# Patient Record
Sex: FEMALE | Race: ASIAN | NOT HISPANIC OR LATINO | Employment: OTHER | URBAN - METROPOLITAN AREA
[De-identification: names, ages, dates, MRNs, and addresses within clinical notes are randomized per-mention and may not be internally consistent; named-entity substitution may affect disease eponyms.]

---

## 2023-02-03 PROBLEM — H35.3131 EARLY DRY STAGE NONEXUDATIVE AGE-RELATED MACULAR DEGENERATION OF BOTH EYES: Status: ACTIVE | Noted: 2023-02-03

## 2023-02-03 PROBLEM — R73.03 PREDIABETES: Status: ACTIVE | Noted: 2023-02-03

## 2023-02-03 PROBLEM — H25.813 COMBINED FORM OF AGE-RELATED CATARACT, BOTH EYES: Status: ACTIVE | Noted: 2023-02-03

## 2023-02-03 PROBLEM — I10 BENIGN ESSENTIAL HYPERTENSION: Status: ACTIVE | Noted: 2023-02-03

## 2023-02-03 PROBLEM — H11.159 PINGUECULA: Status: ACTIVE | Noted: 2023-02-03

## 2023-02-03 PROBLEM — E78.6 LOW HDL (UNDER 40): Status: ACTIVE | Noted: 2023-02-03

## 2023-02-03 PROBLEM — D64.9 ANEMIA: Status: ACTIVE | Noted: 2023-02-03

## 2023-02-03 PROBLEM — H26.9 CATARACTS, BOTH EYES: Status: ACTIVE | Noted: 2023-02-03

## 2023-02-03 PROBLEM — I89.0 LYMPHEDEMA OF RIGHT ARM: Status: ACTIVE | Noted: 2023-02-03

## 2023-02-03 PROBLEM — H52.7 REFRACTIVE ERROR: Status: ACTIVE | Noted: 2023-02-03

## 2023-02-03 PROBLEM — L20.9 ATOPIC DERMATITIS: Status: ACTIVE | Noted: 2023-02-03

## 2023-02-03 PROBLEM — E78.00 HYPERCHOLESTEROLEMIA: Status: ACTIVE | Noted: 2023-02-03

## 2023-02-03 PROBLEM — M19.079 OSTEOARTHRITIS OF MIDFOOT: Status: ACTIVE | Noted: 2023-02-03

## 2023-02-03 PROBLEM — I87.2 CHRONIC STASIS DERMATITIS: Status: ACTIVE | Noted: 2023-02-03

## 2023-02-03 PROBLEM — R25.2 MUSCLE CRAMPS: Status: ACTIVE | Noted: 2023-02-03

## 2023-02-03 PROBLEM — Z97.3 WEARS CONTACT LENSES: Status: ACTIVE | Noted: 2023-02-03

## 2023-02-03 PROBLEM — M81.0 OSTEOPOROSIS: Status: ACTIVE | Noted: 2023-02-03

## 2023-02-03 PROBLEM — C18.7 CANCER OF SIGMOID COLON (MULTI): Status: ACTIVE | Noted: 2023-02-03

## 2023-02-03 PROBLEM — M79.18 MUSCULOSKELETAL PAIN: Status: ACTIVE | Noted: 2023-02-03

## 2023-02-03 PROBLEM — H35.372 EPIRETINAL MEMBRANE (ERM) OF LEFT EYE: Status: ACTIVE | Noted: 2023-02-03

## 2023-02-03 PROBLEM — Z97.3 WEARS GLASSES: Status: ACTIVE | Noted: 2023-02-03

## 2023-02-03 PROBLEM — H53.8 BLURRED VISION, BILATERAL: Status: ACTIVE | Noted: 2023-02-03

## 2023-02-03 PROBLEM — R60.9 DEPENDENT EDEMA: Status: ACTIVE | Noted: 2023-02-03

## 2023-02-03 PROBLEM — H35.30 MACULAR DEGENERATION: Status: ACTIVE | Noted: 2023-02-03

## 2023-02-03 PROBLEM — R73.9 HYPERGLYCEMIA: Status: ACTIVE | Noted: 2023-02-03

## 2023-02-03 PROBLEM — R05.3 COUGH, PERSISTENT: Status: ACTIVE | Noted: 2023-02-03

## 2023-02-03 PROBLEM — E55.9 VITAMIN D DEFICIENCY: Status: ACTIVE | Noted: 2023-02-03

## 2023-02-03 PROBLEM — Z87.312 HISTORY OF STRESS FRACTURE: Status: ACTIVE | Noted: 2023-02-03

## 2023-02-03 PROBLEM — E78.1 ESSENTIAL HYPERTRIGLYCERIDEMIA: Status: ACTIVE | Noted: 2023-02-03

## 2023-02-03 PROBLEM — Z85.3 HISTORY OF ADENOCARCINOMA OF BREAST: Status: ACTIVE | Noted: 2023-02-03

## 2023-02-03 RX ORDER — LISINOPRIL 10 MG/1
TABLET ORAL
COMMUNITY
Start: 2022-08-18 | End: 2023-03-17 | Stop reason: ALTCHOICE

## 2023-02-03 RX ORDER — FENOFIBRATE 54 MG/1
TABLET ORAL
COMMUNITY
Start: 2017-09-08 | End: 2023-03-14

## 2023-02-03 RX ORDER — CALCIUM CARBONATE/VITAMIN D3 600MG-5MCG
TABLET ORAL
COMMUNITY
End: 2023-03-17 | Stop reason: ALTCHOICE

## 2023-02-03 RX ORDER — TRIAMCINOLONE ACETONIDE 1 MG/G
CREAM TOPICAL
COMMUNITY
Start: 2022-09-14 | End: 2023-03-17 | Stop reason: ALTCHOICE

## 2023-02-03 RX ORDER — FUROSEMIDE 20 MG/1
TABLET ORAL
COMMUNITY
Start: 2022-08-18 | End: 2023-03-17

## 2023-02-03 RX ORDER — GABAPENTIN 100 MG/1
CAPSULE ORAL
COMMUNITY
Start: 2021-09-14 | End: 2023-03-17 | Stop reason: ALTCHOICE

## 2023-02-03 RX ORDER — ASPIRIN 81 MG/1
TABLET ORAL
COMMUNITY
Start: 2020-07-30 | End: 2023-03-17 | Stop reason: ALTCHOICE

## 2023-02-03 RX ORDER — ERGOCALCIFEROL 1.25 MG/1
CAPSULE ORAL
COMMUNITY
Start: 2016-04-04 | End: 2023-03-17 | Stop reason: ALTCHOICE

## 2023-02-03 RX ORDER — AZITHROMYCIN 250 MG/1
TABLET, FILM COATED ORAL
COMMUNITY
Start: 2022-10-19 | End: 2023-03-17 | Stop reason: ALTCHOICE

## 2023-03-14 DIAGNOSIS — E78.5 HYPERLIPIDEMIA, UNSPECIFIED HYPERLIPIDEMIA TYPE: Primary | ICD-10-CM

## 2023-03-14 RX ORDER — FENOFIBRATE 54 MG/1
TABLET ORAL
Qty: 90 TABLET | Refills: 0 | Status: SHIPPED | OUTPATIENT
Start: 2023-03-14 | End: 2023-07-24 | Stop reason: SDUPTHER

## 2023-03-16 DIAGNOSIS — R60.9 EDEMA, UNSPECIFIED TYPE: Primary | ICD-10-CM

## 2023-03-17 ENCOUNTER — OFFICE VISIT (OUTPATIENT)
Dept: PRIMARY CARE | Facility: CLINIC | Age: 73
End: 2023-03-17
Payer: COMMERCIAL

## 2023-03-17 VITALS
BODY MASS INDEX: 30.21 KG/M2 | SYSTOLIC BLOOD PRESSURE: 130 MMHG | WEIGHT: 160 LBS | HEIGHT: 61 IN | DIASTOLIC BLOOD PRESSURE: 82 MMHG | HEART RATE: 82 BPM | TEMPERATURE: 99.3 F | OXYGEN SATURATION: 98 %

## 2023-03-17 DIAGNOSIS — M81.0 OSTEOPOROSIS, UNSPECIFIED OSTEOPOROSIS TYPE, UNSPECIFIED PATHOLOGICAL FRACTURE PRESENCE: ICD-10-CM

## 2023-03-17 DIAGNOSIS — Z13.6 SCREENING FOR CARDIOVASCULAR CONDITION: ICD-10-CM

## 2023-03-17 DIAGNOSIS — R73.03 PREDIABETES: ICD-10-CM

## 2023-03-17 DIAGNOSIS — I89.0 LYMPHEDEMA OF RIGHT ARM: ICD-10-CM

## 2023-03-17 DIAGNOSIS — I10 BENIGN ESSENTIAL HYPERTENSION: Primary | ICD-10-CM

## 2023-03-17 DIAGNOSIS — E78.5 HYPERLIPIDEMIA, UNSPECIFIED HYPERLIPIDEMIA TYPE: ICD-10-CM

## 2023-03-17 DIAGNOSIS — Z00.00 HEALTH CARE MAINTENANCE: ICD-10-CM

## 2023-03-17 DIAGNOSIS — Z00.00 HEALTHCARE MAINTENANCE: ICD-10-CM

## 2023-03-17 DIAGNOSIS — Z13.21 ENCOUNTER FOR VITAMIN DEFICIENCY SCREENING: ICD-10-CM

## 2023-03-17 PROCEDURE — 1159F MED LIST DOCD IN RCRD: CPT | Performed by: INTERNAL MEDICINE

## 2023-03-17 PROCEDURE — 3075F SYST BP GE 130 - 139MM HG: CPT | Performed by: INTERNAL MEDICINE

## 2023-03-17 PROCEDURE — 1036F TOBACCO NON-USER: CPT | Performed by: INTERNAL MEDICINE

## 2023-03-17 PROCEDURE — 1160F RVW MEDS BY RX/DR IN RCRD: CPT | Performed by: INTERNAL MEDICINE

## 2023-03-17 PROCEDURE — 3079F DIAST BP 80-89 MM HG: CPT | Performed by: INTERNAL MEDICINE

## 2023-03-17 PROCEDURE — 99214 OFFICE O/P EST MOD 30 MIN: CPT | Performed by: INTERNAL MEDICINE

## 2023-03-17 RX ORDER — CALCIUM CARBONATE/VITAMIN D3 600MG-5MCG
1 TABLET ORAL 2 TIMES DAILY
COMMUNITY

## 2023-03-17 RX ORDER — FUROSEMIDE 20 MG/1
1 TABLET ORAL DAILY
COMMUNITY
Start: 2022-08-18 | End: 2023-06-13 | Stop reason: ALTCHOICE

## 2023-03-17 RX ORDER — FUROSEMIDE 20 MG/1
TABLET ORAL
Qty: 30 TABLET | Refills: 0 | Status: SHIPPED | OUTPATIENT
Start: 2023-03-17 | End: 2023-06-13 | Stop reason: ALTCHOICE

## 2023-03-17 RX ORDER — FENOFIBRATE 54 MG/1
1 TABLET ORAL
COMMUNITY
Start: 2017-09-08 | End: 2023-07-24 | Stop reason: SDUPTHER

## 2023-03-17 RX ORDER — LISINOPRIL 10 MG/1
1 TABLET ORAL DAILY
COMMUNITY
Start: 2022-08-18 | End: 2023-08-17

## 2023-03-17 RX ORDER — GABAPENTIN 100 MG/1
CAPSULE ORAL
COMMUNITY
Start: 2021-09-14 | End: 2023-09-13 | Stop reason: SDUPTHER

## 2023-03-17 RX ORDER — GINSENG 100 MG
CAPSULE ORAL
COMMUNITY
Start: 2019-05-21

## 2023-03-17 RX ORDER — TRIAMCINOLONE ACETONIDE 1 MG/G
1 CREAM TOPICAL 2 TIMES DAILY
COMMUNITY
Start: 2022-09-14

## 2023-03-17 ASSESSMENT — ENCOUNTER SYMPTOMS
CARDIOVASCULAR NEGATIVE: 1
HEMATOLOGIC/LYMPHATIC NEGATIVE: 1
CONSTITUTIONAL NEGATIVE: 1
PSYCHIATRIC NEGATIVE: 1
NEUROLOGICAL NEGATIVE: 1
MUSCULOSKELETAL NEGATIVE: 1
RESPIRATORY NEGATIVE: 1
GASTROINTESTINAL NEGATIVE: 1
ENDOCRINE NEGATIVE: 1
EYES NEGATIVE: 1

## 2023-03-17 ASSESSMENT — COLUMBIA-SUICIDE SEVERITY RATING SCALE - C-SSRS: 1. IN THE PAST MONTH, HAVE YOU WISHED YOU WERE DEAD OR WISHED YOU COULD GO TO SLEEP AND NOT WAKE UP?: NO

## 2023-03-17 ASSESSMENT — PATIENT HEALTH QUESTIONNAIRE - PHQ9
1. LITTLE INTEREST OR PLEASURE IN DOING THINGS: NOT AT ALL
2. FEELING DOWN, DEPRESSED OR HOPELESS: NOT AT ALL
SUM OF ALL RESPONSES TO PHQ9 QUESTIONS 1 AND 2: 0

## 2023-03-17 ASSESSMENT — PAIN SCALES - GENERAL: PAINLEVEL: 0-NO PAIN

## 2023-03-17 NOTE — PROGRESS NOTES
"Subjective   Patient ID: Santy Brody is a 72 y.o. female who presents for Follow-up.    HPI     Review of Systems   Constitutional: Negative.    HENT: Negative.     Eyes: Negative.    Respiratory: Negative.     Cardiovascular: Negative.    Gastrointestinal: Negative.    Endocrine: Negative.    Musculoskeletal: Negative.    Skin: Negative.    Neurological: Negative.    Hematological: Negative.    Psychiatric/Behavioral: Negative.     All other systems reviewed and are negative.      Objective   Ht 1.549 m (5' 1\")   Wt 72.6 kg (160 lb)   BMI 30.23 kg/m²     Physical Exam  Vitals and nursing note reviewed. Exam conducted with a chaperone present.   Constitutional:       Appearance: Normal appearance.   HENT:      Head: Normocephalic and atraumatic.      Right Ear: Tympanic membrane, ear canal and external ear normal.      Left Ear: Tympanic membrane, ear canal and external ear normal.      Nose: Nose normal.      Mouth/Throat:      Mouth: Mucous membranes are moist.      Pharynx: Oropharynx is clear.   Eyes:      Extraocular Movements: Extraocular movements intact.      Conjunctiva/sclera: Conjunctivae normal.      Pupils: Pupils are equal, round, and reactive to light.   Cardiovascular:      Rate and Rhythm: Normal rate and regular rhythm.      Pulses: Normal pulses.      Heart sounds: Normal heart sounds.   Pulmonary:      Effort: Pulmonary effort is normal.      Breath sounds: Normal breath sounds.   Abdominal:      General: Abdomen is flat. Bowel sounds are normal.      Palpations: Abdomen is soft.   Musculoskeletal:         General: Normal range of motion.      Cervical back: Neck supple.   Skin:     General: Skin is warm and dry.      Capillary Refill: Capillary refill takes 2 to 3 seconds.   Neurological:      General: No focal deficit present.      Mental Status: She is alert and oriented to person, place, and time. Mental status is at baseline.   Psychiatric:         Mood and Affect: Mood normal.         " Behavior: Behavior normal.         Thought Content: Thought content normal.         Judgment: Judgment normal.         Assessment/Plan   Problem List Items Addressed This Visit          Medium    Benign essential hypertension - Primary    Relevant Orders    CBC    Comprehensive Metabolic Panel    TSH with reflex to Free T4 if abnormal    Hypercholesterolemia    Lymphedema of right arm    Osteoarthritis of midfoot    Osteoporosis    Relevant Orders    Vitamin D 1,25 Dihydroxy    Prediabetes    Relevant Orders    Hemoglobin A1C     Other Visit Diagnoses       Health care maintenance        Hyperlipidemia, unspecified hyperlipidemia type        Relevant Orders    CBC    Comprehensive Metabolic Panel    Lipid Panel    TSH with reflex to Free T4 if abnormal    Healthcare maintenance        Relevant Orders    Comprehensive Metabolic Panel    Lipid Panel    Screening for cardiovascular condition        Relevant Orders    Lipid Panel    Encounter for vitamin deficiency screening        Relevant Orders    Vitamin D 1,25 Dihydroxy

## 2023-04-04 ENCOUNTER — HOSPITAL ENCOUNTER (OUTPATIENT)
Dept: DATA CONVERSION | Facility: HOSPITAL | Age: 73
End: 2023-04-04
Attending: OPHTHALMOLOGY | Admitting: OPHTHALMOLOGY
Payer: COMMERCIAL

## 2023-04-04 DIAGNOSIS — H25.812 COMBINED FORMS OF AGE-RELATED CATARACT, LEFT EYE: ICD-10-CM

## 2023-04-04 DIAGNOSIS — K76.0 FATTY (CHANGE OF) LIVER, NOT ELSEWHERE CLASSIFIED: ICD-10-CM

## 2023-04-04 DIAGNOSIS — I10 ESSENTIAL (PRIMARY) HYPERTENSION: ICD-10-CM

## 2023-04-04 DIAGNOSIS — Z96.641 PRESENCE OF RIGHT ARTIFICIAL HIP JOINT: ICD-10-CM

## 2023-06-13 DIAGNOSIS — I89.0 LYMPHEDEMA OF RIGHT ARM: Primary | ICD-10-CM

## 2023-06-13 RX ORDER — FUROSEMIDE 20 MG/1
TABLET ORAL
Qty: 90 TABLET | Refills: 1 | Status: SHIPPED | OUTPATIENT
Start: 2023-06-13 | End: 2024-01-15

## 2023-07-24 DIAGNOSIS — E78.5 HYPERLIPIDEMIA, UNSPECIFIED HYPERLIPIDEMIA TYPE: ICD-10-CM

## 2023-07-24 RX ORDER — FENOFIBRATE 54 MG/1
54 TABLET ORAL
Qty: 90 TABLET | Refills: 1 | Status: SHIPPED | OUTPATIENT
Start: 2023-07-24 | End: 2024-02-09 | Stop reason: SDUPTHER

## 2023-08-17 DIAGNOSIS — I10 BENIGN ESSENTIAL HYPERTENSION: Primary | ICD-10-CM

## 2023-08-17 RX ORDER — LISINOPRIL 10 MG/1
10 TABLET ORAL DAILY
Qty: 30 TABLET | Refills: 5 | Status: SHIPPED | OUTPATIENT
Start: 2023-08-17 | End: 2024-02-29 | Stop reason: SDUPTHER

## 2023-09-01 LAB
ALANINE AMINOTRANSFERASE (SGPT) (U/L) IN SER/PLAS: 11 U/L (ref 7–45)
ALBUMIN (G/DL) IN SER/PLAS: 4 G/DL (ref 3.4–5)
ALKALINE PHOSPHATASE (U/L) IN SER/PLAS: 64 U/L (ref 33–136)
ANION GAP IN SER/PLAS: 12 MMOL/L (ref 10–20)
ASPARTATE AMINOTRANSFERASE (SGOT) (U/L) IN SER/PLAS: 21 U/L (ref 9–39)
BASOPHILS (10*3/UL) IN BLOOD BY AUTOMATED COUNT: 0.04 X10E9/L (ref 0–0.1)
BASOPHILS/100 LEUKOCYTES IN BLOOD BY AUTOMATED COUNT: 1.5 % (ref 0–2)
BILIRUBIN TOTAL (MG/DL) IN SER/PLAS: 0.8 MG/DL (ref 0–1.2)
CALCIUM (MG/DL) IN SER/PLAS: 8.9 MG/DL (ref 8.6–10.3)
CARBON DIOXIDE, TOTAL (MMOL/L) IN SER/PLAS: 26 MMOL/L (ref 21–32)
CHLORIDE (MMOL/L) IN SER/PLAS: 100 MMOL/L (ref 98–107)
CREATININE (MG/DL) IN SER/PLAS: 1.2 MG/DL (ref 0.5–1.05)
EOSINOPHILS (10*3/UL) IN BLOOD BY AUTOMATED COUNT: 0.03 X10E9/L (ref 0–0.4)
EOSINOPHILS/100 LEUKOCYTES IN BLOOD BY AUTOMATED COUNT: 1.2 % (ref 0–6)
ERYTHROCYTE DISTRIBUTION WIDTH (RATIO) BY AUTOMATED COUNT: 17.2 % (ref 11.5–14.5)
ERYTHROCYTE MEAN CORPUSCULAR HEMOGLOBIN CONCENTRATION (G/DL) BY AUTOMATED: 30.6 G/DL (ref 32–36)
ERYTHROCYTE MEAN CORPUSCULAR VOLUME (FL) BY AUTOMATED COUNT: 75 FL (ref 80–100)
ERYTHROCYTES (10*6/UL) IN BLOOD BY AUTOMATED COUNT: 3.51 X10E12/L (ref 4–5.2)
GFR FEMALE: 48 ML/MIN/1.73M2
GLUCOSE (MG/DL) IN SER/PLAS: 92 MG/DL (ref 74–99)
HEMATOCRIT (%) IN BLOOD BY AUTOMATED COUNT: 26.5 % (ref 36–46)
HEMOGLOBIN (G/DL) IN BLOOD: 8.1 G/DL (ref 12–16)
IMMATURE GRANULOCYTES/100 LEUKOCYTES IN BLOOD BY AUTOMATED COUNT: 0.4 % (ref 0–0.9)
LEUKOCYTES (10*3/UL) IN BLOOD BY AUTOMATED COUNT: 2.6 X10E9/L (ref 4.4–11.3)
LYMPHOCYTES (10*3/UL) IN BLOOD BY AUTOMATED COUNT: 1.08 X10E9/L (ref 0.8–3)
LYMPHOCYTES/100 LEUKOCYTES IN BLOOD BY AUTOMATED COUNT: 41.7 % (ref 13–44)
MONOCYTES (10*3/UL) IN BLOOD BY AUTOMATED COUNT: 0.23 X10E9/L (ref 0.05–0.8)
MONOCYTES/100 LEUKOCYTES IN BLOOD BY AUTOMATED COUNT: 8.9 % (ref 2–10)
NEUTROPHILS (10*3/UL) IN BLOOD BY AUTOMATED COUNT: 1.2 X10E9/L (ref 1.6–5.5)
NEUTROPHILS/100 LEUKOCYTES IN BLOOD BY AUTOMATED COUNT: 46.3 % (ref 40–80)
NRBC (PER 100 WBCS) BY AUTOMATED COUNT: 1.2 /100 WBC (ref 0–0)
PLATELETS (10*3/UL) IN BLOOD AUTOMATED COUNT: 237 X10E9/L (ref 150–450)
POTASSIUM (MMOL/L) IN SER/PLAS: 4.6 MMOL/L (ref 3.5–5.3)
PROTEIN TOTAL: 7.5 G/DL (ref 6.4–8.2)
SODIUM (MMOL/L) IN SER/PLAS: 133 MMOL/L (ref 136–145)
UREA NITROGEN (MG/DL) IN SER/PLAS: 21 MG/DL (ref 6–23)

## 2023-09-06 ENCOUNTER — LAB (OUTPATIENT)
Dept: LAB | Facility: LAB | Age: 73
End: 2023-09-06
Payer: COMMERCIAL

## 2023-09-06 VITALS
HEART RATE: 97 BPM | DIASTOLIC BLOOD PRESSURE: 77 MMHG | SYSTOLIC BLOOD PRESSURE: 146 MMHG | TEMPERATURE: 97.5 F | RESPIRATION RATE: 16 BRPM

## 2023-09-06 DIAGNOSIS — E78.5 HYPERLIPIDEMIA, UNSPECIFIED HYPERLIPIDEMIA TYPE: ICD-10-CM

## 2023-09-06 DIAGNOSIS — Z13.21 ENCOUNTER FOR VITAMIN DEFICIENCY SCREENING: ICD-10-CM

## 2023-09-06 DIAGNOSIS — R73.03 PREDIABETES: ICD-10-CM

## 2023-09-06 DIAGNOSIS — Z13.6 SCREENING FOR CARDIOVASCULAR CONDITION: ICD-10-CM

## 2023-09-06 DIAGNOSIS — I10 BENIGN ESSENTIAL HYPERTENSION: ICD-10-CM

## 2023-09-06 DIAGNOSIS — M81.0 OSTEOPOROSIS, UNSPECIFIED OSTEOPOROSIS TYPE, UNSPECIFIED PATHOLOGICAL FRACTURE PRESENCE: ICD-10-CM

## 2023-09-06 DIAGNOSIS — Z00.00 HEALTHCARE MAINTENANCE: ICD-10-CM

## 2023-09-06 LAB
ALANINE AMINOTRANSFERASE (SGPT) (U/L) IN SER/PLAS: 13 U/L (ref 7–45)
ALBUMIN (G/DL) IN SER/PLAS: 4.4 G/DL (ref 3.4–5)
ALKALINE PHOSPHATASE (U/L) IN SER/PLAS: 63 U/L (ref 33–136)
ANION GAP IN SER/PLAS: 14 MMOL/L (ref 10–20)
ASPARTATE AMINOTRANSFERASE (SGOT) (U/L) IN SER/PLAS: 22 U/L (ref 9–39)
BILIRUBIN TOTAL (MG/DL) IN SER/PLAS: 1 MG/DL (ref 0–1.2)
CALCIUM (MG/DL) IN SER/PLAS: 10 MG/DL (ref 8.6–10.6)
CARBON DIOXIDE, TOTAL (MMOL/L) IN SER/PLAS: 27 MMOL/L (ref 21–32)
CHLORIDE (MMOL/L) IN SER/PLAS: 95 MMOL/L (ref 98–107)
CHOLESTEROL (MG/DL) IN SER/PLAS: 195 MG/DL (ref 0–199)
CHOLESTEROL IN HDL (MG/DL) IN SER/PLAS: 60.7 MG/DL
CHOLESTEROL/HDL RATIO: 3.2
CREATININE (MG/DL) IN SER/PLAS: 1.2 MG/DL (ref 0.5–1.05)
GFR FEMALE: 48 ML/MIN/1.73M2
GLUCOSE (MG/DL) IN SER/PLAS: 94 MG/DL (ref 74–99)
LDL: 89 MG/DL (ref 0–99)
NON HDL CHOLESTEROL: 134 MG/DL
POTASSIUM (MMOL/L) IN SER/PLAS: 4.7 MMOL/L (ref 3.5–5.3)
PROTEIN TOTAL: 7.7 G/DL (ref 6.4–8.2)
SODIUM (MMOL/L) IN SER/PLAS: 131 MMOL/L (ref 136–145)
THYROTROPIN (MIU/L) IN SER/PLAS BY DETECTION LIMIT <= 0.05 MIU/L: 2.22 MIU/L (ref 0.44–3.98)
TRIGLYCERIDE (MG/DL) IN SER/PLAS: 228 MG/DL (ref 0–149)
UREA NITROGEN (MG/DL) IN SER/PLAS: 18 MG/DL (ref 6–23)
VLDL: 46 MG/DL (ref 0–40)

## 2023-09-06 PROCEDURE — 83036 HEMOGLOBIN GLYCOSYLATED A1C: CPT

## 2023-09-06 PROCEDURE — 80053 COMPREHEN METABOLIC PANEL: CPT

## 2023-09-06 PROCEDURE — 84443 ASSAY THYROID STIM HORMONE: CPT

## 2023-09-06 PROCEDURE — 85027 COMPLETE CBC AUTOMATED: CPT

## 2023-09-06 PROCEDURE — 82652 VIT D 1 25-DIHYDROXY: CPT

## 2023-09-06 PROCEDURE — 80061 LIPID PANEL: CPT

## 2023-09-06 PROCEDURE — 36415 COLL VENOUS BLD VENIPUNCTURE: CPT

## 2023-09-07 LAB
ERYTHROCYTE DISTRIBUTION WIDTH (RATIO) BY AUTOMATED COUNT: 16.7 % (ref 11.5–14.5)
ERYTHROCYTE MEAN CORPUSCULAR HEMOGLOBIN CONCENTRATION (G/DL) BY AUTOMATED: 30.2 G/DL (ref 32–36)
ERYTHROCYTE MEAN CORPUSCULAR VOLUME (FL) BY AUTOMATED COUNT: 75 FL (ref 80–100)
ERYTHROCYTES (10*6/UL) IN BLOOD BY AUTOMATED COUNT: 3.58 X10E12/L (ref 4–5.2)
ESTIMATED AVERAGE GLUCOSE FOR HBA1C: 114 MG/DL
HEMATOCRIT (%) IN BLOOD BY AUTOMATED COUNT: 26.8 % (ref 36–46)
HEMOGLOBIN (G/DL) IN BLOOD: 8.1 G/DL (ref 12–16)
HEMOGLOBIN A1C/HEMOGLOBIN TOTAL IN BLOOD: 5.6 %
LEUKOCYTES (10*3/UL) IN BLOOD BY AUTOMATED COUNT: 2.9 X10E9/L (ref 4.4–11.3)
NRBC (PER 100 WBCS) BY AUTOMATED COUNT: 1.4 /100 WBC (ref 0–0)
PLATELETS (10*3/UL) IN BLOOD AUTOMATED COUNT: 300 X10E9/L (ref 150–450)

## 2023-09-09 LAB — VITAMIN D 1,25-DIHYDROXY: 56 PG/ML (ref 19.9–79.3)

## 2023-09-12 NOTE — PROGRESS NOTES
"Subjective   Patient ID: Santy Brody is a 73 y.o. female who presents for Annual Exam (Medicare wellness ) and Med Refill (Gabapentin ).  Med Refill      Feels ok.    On   2  chemo  meds.   Signs of  remission.     Below is the patient's most recent value for Albumin, ALT, AST, BUN, Calcium, Chloride, Cholesterol, CO2, Creatinine, GFR, Glucose, HDL, Hematocrit, Hemoglobin, Hemoglobin A1C, LDL, Magnesium, Phosphorus, Platelets, Potassium, PSA, Sodium, Triglycerides, and WBC.   Lab Results   Component Value Date    ALBUMIN 4.4 09/06/2023    ALT 13 09/06/2023    AST 22 09/06/2023    BUN 18 09/06/2023    CALCIUM 10.0 09/06/2023    CL 95 (L) 09/06/2023    CHOL 195 09/06/2023    CO2 27 09/06/2023    CREATININE 1.20 (H) 09/06/2023    HDL 60.7 09/06/2023    HCT 26.8 (L) 09/06/2023    HGB 8.1 (L) 09/06/2023    HGBA1C 5.6 09/06/2023     09/06/2023    K 4.7 09/06/2023     (L) 09/06/2023    TRIG 228 (H) 09/06/2023    WBC 2.9 (L) 09/06/2023     Note: for a comprehensive list of the patient's lab results, access the Results Review activity.  Review of Systems   Constitutional: Negative.    All other systems reviewed and are negative.      Objective   BP Readings from Last 3 Encounters:   09/13/23 110/60   03/17/23 130/82   03/09/23 (!) 165/105      Wt Readings from Last 3 Encounters:   09/13/23 72.9 kg (160 lb 12.8 oz)   03/17/23 72.6 kg (160 lb)   03/09/23 72.8 kg (160 lb 7.9 oz)      BMI: Estimated body mass index is 30.38 kg/m² as calculated from the following:    Height as of this encounter: 1.549 m (5' 1\").    Weight as of this encounter: 72.9 kg (160 lb 12.8 oz).    BSA: Estimated body surface area is 1.77 meters squared as calculated from the following:    Height as of this encounter: 1.549 m (5' 1\").    Weight as of this encounter: 72.9 kg (160 lb 12.8 oz).  Physical Exam  Vitals and nursing note reviewed.   Constitutional:       Appearance: Normal appearance.   HENT:      Head: Normocephalic and atraumatic. "      Right Ear: External ear normal.      Left Ear: External ear normal.      Nose: Nose normal.   Eyes:      Conjunctiva/sclera: Conjunctivae normal.   Cardiovascular:      Rate and Rhythm: Normal rate and regular rhythm.      Pulses: Normal pulses.      Heart sounds: Normal heart sounds.   Pulmonary:      Effort: Pulmonary effort is normal.      Breath sounds: Normal breath sounds.   Musculoskeletal:         General: No swelling. Normal range of motion.   Skin:     General: Skin is warm and dry.      Capillary Refill: Capillary refill takes less than 2 seconds.   Neurological:      General: No focal deficit present.      Mental Status: She is alert and oriented to person, place, and time. Mental status is at baseline.   Psychiatric:         Mood and Affect: Mood normal.         Behavior: Behavior normal.         Thought Content: Thought content normal.         Assessment/Plan   Problem List Items Addressed This Visit          Medium    Benign essential hypertension    Cancer of sigmoid colon (CMS/HCC)    Chronic stasis dermatitis    History of adenocarcinoma of breast    Hyperglycemia    Hyperlipidemia    Low HDL (under 40)    Musculoskeletal pain    Relevant Medications    gabapentin (Neurontin) 100 mg capsule    Osteoporosis    Prediabetes - Primary    Vitamin D deficiency     Other Visit Diagnoses       Screening for multiple conditions        Routine general medical examination at health care facility        Relevant Orders    1 Year Follow Up In Primary Care - Wellness Exam    Encounter for screening mammogram for malignant neoplasm of breast        Relevant Orders    BI mammo left screening tomosynthesis             Awv  Rev lab

## 2023-09-13 ENCOUNTER — OFFICE VISIT (OUTPATIENT)
Dept: PRIMARY CARE | Facility: CLINIC | Age: 73
End: 2023-09-13
Payer: MEDICARE

## 2023-09-13 VITALS
WEIGHT: 160.8 LBS | HEART RATE: 90 BPM | HEIGHT: 61 IN | BODY MASS INDEX: 30.36 KG/M2 | DIASTOLIC BLOOD PRESSURE: 60 MMHG | SYSTOLIC BLOOD PRESSURE: 110 MMHG | OXYGEN SATURATION: 96 %

## 2023-09-13 DIAGNOSIS — M81.0 OSTEOPOROSIS, UNSPECIFIED OSTEOPOROSIS TYPE, UNSPECIFIED PATHOLOGICAL FRACTURE PRESENCE: ICD-10-CM

## 2023-09-13 DIAGNOSIS — R73.9 HYPERGLYCEMIA: ICD-10-CM

## 2023-09-13 DIAGNOSIS — Z12.31 ENCOUNTER FOR SCREENING MAMMOGRAM FOR MALIGNANT NEOPLASM OF BREAST: ICD-10-CM

## 2023-09-13 DIAGNOSIS — Z00.00 ROUTINE GENERAL MEDICAL EXAMINATION AT HEALTH CARE FACILITY: ICD-10-CM

## 2023-09-13 DIAGNOSIS — C18.7 CANCER OF SIGMOID COLON (MULTI): ICD-10-CM

## 2023-09-13 DIAGNOSIS — E78.5 HYPERLIPIDEMIA, UNSPECIFIED HYPERLIPIDEMIA TYPE: ICD-10-CM

## 2023-09-13 DIAGNOSIS — Z85.3 HISTORY OF ADENOCARCINOMA OF BREAST: ICD-10-CM

## 2023-09-13 DIAGNOSIS — Z13.89 SCREENING FOR MULTIPLE CONDITIONS: ICD-10-CM

## 2023-09-13 DIAGNOSIS — E78.6 LOW HDL (UNDER 40): ICD-10-CM

## 2023-09-13 DIAGNOSIS — E55.9 VITAMIN D DEFICIENCY: ICD-10-CM

## 2023-09-13 DIAGNOSIS — R73.03 PREDIABETES: Primary | ICD-10-CM

## 2023-09-13 DIAGNOSIS — M79.18 MUSCULOSKELETAL PAIN: ICD-10-CM

## 2023-09-13 DIAGNOSIS — I10 BENIGN ESSENTIAL HYPERTENSION: ICD-10-CM

## 2023-09-13 DIAGNOSIS — I87.2 CHRONIC STASIS DERMATITIS: ICD-10-CM

## 2023-09-13 PROCEDURE — 3078F DIAST BP <80 MM HG: CPT | Performed by: INTERNAL MEDICINE

## 2023-09-13 PROCEDURE — 1160F RVW MEDS BY RX/DR IN RCRD: CPT | Performed by: INTERNAL MEDICINE

## 2023-09-13 PROCEDURE — 1126F AMNT PAIN NOTED NONE PRSNT: CPT | Performed by: INTERNAL MEDICINE

## 2023-09-13 PROCEDURE — 3008F BODY MASS INDEX DOCD: CPT | Performed by: INTERNAL MEDICINE

## 2023-09-13 PROCEDURE — 3074F SYST BP LT 130 MM HG: CPT | Performed by: INTERNAL MEDICINE

## 2023-09-13 PROCEDURE — G0439 PPPS, SUBSEQ VISIT: HCPCS | Performed by: INTERNAL MEDICINE

## 2023-09-13 PROCEDURE — 1159F MED LIST DOCD IN RCRD: CPT | Performed by: INTERNAL MEDICINE

## 2023-09-13 PROCEDURE — 1036F TOBACCO NON-USER: CPT | Performed by: INTERNAL MEDICINE

## 2023-09-13 RX ORDER — GABAPENTIN 100 MG/1
100 CAPSULE ORAL NIGHTLY
Qty: 90 CAPSULE | Refills: 1 | Status: SHIPPED | OUTPATIENT
Start: 2023-09-13 | End: 2024-03-18 | Stop reason: SDUPTHER

## 2023-09-13 ASSESSMENT — PATIENT HEALTH QUESTIONNAIRE - PHQ9
SUM OF ALL RESPONSES TO PHQ9 QUESTIONS 1 AND 2: 0
1. LITTLE INTEREST OR PLEASURE IN DOING THINGS: NOT AT ALL
2. FEELING DOWN, DEPRESSED OR HOPELESS: NOT AT ALL

## 2023-09-13 ASSESSMENT — ENCOUNTER SYMPTOMS
OCCASIONAL FEELINGS OF UNSTEADINESS: 0
CONSTITUTIONAL NEGATIVE: 1
DEPRESSION: 0
LOSS OF SENSATION IN FEET: 0

## 2023-09-13 ASSESSMENT — COLUMBIA-SUICIDE SEVERITY RATING SCALE - C-SSRS
6. HAVE YOU EVER DONE ANYTHING, STARTED TO DO ANYTHING, OR PREPARED TO DO ANYTHING TO END YOUR LIFE?: NO
1. IN THE PAST MONTH, HAVE YOU WISHED YOU WERE DEAD OR WISHED YOU COULD GO TO SLEEP AND NOT WAKE UP?: NO
2. HAVE YOU ACTUALLY HAD ANY THOUGHTS OF KILLING YOURSELF?: NO

## 2023-09-13 NOTE — PROGRESS NOTES
"Subjective   Patient ID: Santy Brody is a 73 y.o. female who presents for Annual Exam (Medicare wellness ) and Med Refill (Gabapentin ).    HPI     Review of Systems    Objective   BP (!) 148/91   Pulse 90   Ht 1.549 m (5' 1\")   Wt 72.9 kg (160 lb 12.8 oz)   SpO2 96%   BMI 30.38 kg/m²     Physical Exam    Assessment/Plan          "

## 2023-09-14 NOTE — H&P
History of Present Illness:   History Present Illness:  Reason for surgery: cataract right eye   HPI:    Patient presents today for planned CEIOL OD    Allergies:        Allergies:  ·  Xgeva : Other (Severe)    Home Medication Review:   Home Medications Reviewed: yes     Impression/Procedure:   ·  Impression and Planned Procedure: CEIOL OD       ERAS (Enhanced Recovery After Surgery):  ·  ERAS Patient: no       Vital Signs:  Temperature C: 36.4 degrees C   Temperature F: 97.5 degrees F   Heart Rate: 97 beats per minute   Respiratory Rate: 16 breath per minute   Blood Pressure Systolic: 146 mm/Hg   Blood Pressure Diastolic: 77 mm/Hg     Physical Exam by System:    Constitutional: Well developed, awake/alert, no distress,  alert and cooperative   Eyes: PERRL, see clinic note   ENMT: mucous membranes moist, no apparent injury,  no lesions seen   Respiratory/Thorax: No respiratory distress   Cardiovascular: Per anesthesia   Extremities: normal extremities, no cyanosis edema   Skin: Warm and dry, no lesions, no rashes     Consent:   COVID-19 Consent:  ·  COVID-19 Risk Consent Surgeon has reviewed key risks related to the risk of saba COVID-19 and if they contract COVID-19 what the risks are.     Attestation:   Note Completion:  I am a:  Resident/Fellow   Attending Attestation I saw and evaluated the patient.  I personally obtained the key and critical portions of the history and physical exam or was physically present for key and  critical portions performed by the resident/fellow. I reviewed the resident/fellow?s documentation and discussed the patient with the resident/fellow.  I agree with the resident/fellow?s medical decision making as documented in the note.     I personally evaluated the patient on 04-Apr-2023         Electronic Signatures:  Catalino Delaney (Resident))  (Signed 04-Apr-2023 12:47)   Authored: History of Present Illness, Allergies, Home  Medication Review, Impression/Procedure, ERAS,  Physical Exam, Consent, Note Completion  Isabel Wells)  (Signed 04-Apr-2023 13:22)   Authored: Note Completion   Co-Signer: History of Present Illness, Allergies, Home Medication Review, Impression/Procedure, ERAS, Physical Exam, Consent, Note Completion      Last Updated: 04-Apr-2023 13:22 by Isabel Wells)

## 2023-09-21 VITALS — BODY MASS INDEX: 31.64 KG/M2 | WEIGHT: 161.16 LBS | HEIGHT: 60 IN

## 2023-09-21 DIAGNOSIS — C50.919 MALIGNANT NEOPLASM OF FEMALE BREAST, UNSPECIFIED ESTROGEN RECEPTOR STATUS, UNSPECIFIED LATERALITY, UNSPECIFIED SITE OF BREAST (MULTI): Primary | ICD-10-CM

## 2023-09-21 RX ORDER — HEPARIN SODIUM,PORCINE/PF 10 UNIT/ML
50 SYRINGE (ML) INTRAVENOUS AS NEEDED
Status: CANCELLED | OUTPATIENT
Start: 2023-09-30

## 2023-09-21 RX ORDER — HEPARIN 100 UNIT/ML
500 SYRINGE INTRAVENOUS AS NEEDED
Status: CANCELLED | OUTPATIENT
Start: 2023-09-30

## 2023-10-02 ENCOUNTER — ANCILLARY PROCEDURE (OUTPATIENT)
Dept: RADIOLOGY | Facility: CLINIC | Age: 73
End: 2023-10-02
Payer: COMMERCIAL

## 2023-10-02 DIAGNOSIS — Z51.12 ENCOUNTER FOR ANTINEOPLASTIC IMMUNOTHERAPY: ICD-10-CM

## 2023-10-02 DIAGNOSIS — C50.911 MALIGNANT NEOPLASM OF UNSPECIFIED SITE OF RIGHT FEMALE BREAST (MULTI): ICD-10-CM

## 2023-10-02 DIAGNOSIS — Z79.818 LONG TERM (CURRENT) USE OF OTHER AGENTS AFFECTING ESTROGEN RECEPTORS AND ESTROGEN LEVELS: ICD-10-CM

## 2023-10-02 DIAGNOSIS — C79.51 SECONDARY MALIGNANT NEOPLASM OF BONE (MULTI): ICD-10-CM

## 2023-10-02 PROCEDURE — 77085 DXA BONE DENSITY AXL VRT FX: CPT

## 2023-10-02 NOTE — OP NOTE
Post Operative Note:     Post-Procedure Diagnosis: cataract, right   Procedure: 1. cataract extraction with lens placement,  right  2.   3.   4.   5.   Surgeon: Isabel Wells MD   Resident/Fellow/Other Assistant: Catalino Delaney MD   Estimated Blood Loss (mL): none   Specimen: no   Findings: as expected     Operative Report Dictated:  Dictation: not applicable - note contains Operative  Report   Operative Report:    The patient was escorted to the operating room where a time out was completed and monitored anesthesia care was begun. The patient was prepped and draped in the usual  sterile fashion for intraocular surgery. A lid speculum was placed and the operating microscope was positioned. A paracentesis was made to the left of the planned cataract incision with a 1mm blade. Shugarcaine followed by Viscoat was used to replace  the aqueous humor. A temporal clear corneal wound was made with a 2.4 mm keratome, extending 2 mm into clear cornea before entering the anterior chamber. A continuous curvilinear  capsulorhexis of approximately 5 mm in diameter was performed. Hydrodissection  was performed using a canula. The pre-chopper was used to crack the nucleus into smaller pieces which were then removed with the assistance of a horizontal chopper and phaco hand piece. Residual cortex was removed with IA. ProVisc was used to inflate  the capsular bag. The lens implant  álvaro SN60WF 6.0 diopter intraocular lens. The lens was inserted into the capsular bag and rotated to the proper position with a willis. Residual viscoelastic was removed from the eye with the irrigation/aspiration  instrument. The wounds were checked and found to be watertight. The anterior chamber was at physiologic depth and pressure. The lid speculum was removed and a patch and shield were taped in place. The patient tolerated the procedure well, and there were  no complications.      Attestation:   Note Completion:  Attending Attestation I  was present for the entire procedure         Electronic Signatures:  Isabel Wells)  (Signed 04-Apr-2023 14:08)   Authored: Post Operative Note, Note Completion      Last Updated: 04-Apr-2023 14:08 by Isabel Wells)

## 2023-10-03 ASSESSMENT — PATIENT HEALTH QUESTIONNAIRE - PHQ9
3. TROUBLE FALLING OR STAYING ASLEEP OR SLEEPING TOO MUCH: NOT AT ALL
SUM OF ALL RESPONSES TO PHQ QUESTIONS 1-9: 0
4. FEELING TIRED OR HAVING LITTLE ENERGY: NOT AT ALL
2. FEELING DOWN, DEPRESSED, IRRITABLE, OR HOPELESS: 0
6. FEELING BAD ABOUT YOURSELF - OR THAT YOU ARE A FAILURE OR HAVE LET YOURSELF OR YOUR FAMILY DOWN: NOT AT ALL
3. TROUBLE FALLING OR STAYING ASLEEP OR SLEEPING TOO MUCH: NOT AT ALL
1. LITTLE INTEREST OR PLEASURE IN DOING THINGS: NOT AT ALL
7. TROUBLE CONCENTRATING ON THINGS, SUCH AS READING THE NEWSPAPER OR WATCHING TELEVISION: 0
2. FEELING DOWN, DEPRESSED, IRRITABLE, OR HOPELESS: NOT AT ALL
5. POOR APPETITE OR OVEREATING: NOT AT ALL
4. FEELING TIRED OR HAVING LITTLE ENERGY: NOT AT ALL
7. TROUBLE CONCENTRATING ON THINGS, SUCH AS READING THE NEWSPAPER OR WATCHING TELEVISION: NOT AT ALL
6. FEELING BAD ABOUT YOURSELF - OR THAT YOU ARE A FAILURE OR HAVE LET YOURSELF OR YOUR FAMILY DOWN: 0
2. FEELING DOWN, DEPRESSED OR HOPELESS: NOT AT ALL
4. FEELING TIRED OR HAVING LITTLE ENERGY: 0
8. MOVING OR SPEAKING SO SLOWLY THAT OTHER PEOPLE COULD HAVE NOTICED. OR THE OPPOSITE, BEING SO FIGETY OR RESTLESS THAT YOU HAVE BEEN MOVING AROUND A LOT MORE THAN USUAL: 0
SUM OF ALL RESPONSES TO PHQ QUESTIONS 1-9: 0
1. LITTLE INTEREST OR PLEASURE IN DOING THINGS: NOT AT ALL
9. THOUGHTS THAT YOU WOULD BE BETTER OFF DEAD, OR OF HURTING YOURSELF: NOT AT ALL
8. MOVING OR SPEAKING SO SLOWLY THAT OTHER PEOPLE COULD HAVE NOTICED. OR THE OPPOSITE, BEING SO FIGETY OR RESTLESS THAT YOU HAVE BEEN MOVING AROUND A LOT MORE THAN USUAL: NOT AT ALL
9. THOUGHTS THAT YOU WOULD BE BETTER OFF DEAD, OR OF HURTING YOURSELF: NOT AT ALL
1. LITTLE INTEREST OR PLEASURE IN DOING THINGS: 0
9. THOUGHTS THAT YOU WOULD BE BETTER OFF DEAD, OR OF HURTING YOURSELF: 0
5. POOR APPETITE OR OVEREATING: NOT AT ALL
7. TROUBLE CONCENTRATING ON THINGS, SUCH AS READING THE NEWSPAPER OR WATCHING TELEVISION: NOT AT ALL
5. POOR APPETITE OR OVEREATING: 0
8. MOVING OR SPEAKING SO SLOWLY THAT OTHER PEOPLE COULD HAVE NOTICED. OR THE OPPOSITE, BEING SO FIGETY OR RESTLESS THAT YOU HAVE BEEN MOVING AROUND A LOT MORE THAN USUAL: NOT AT ALL
3. TROUBLE FALLING OR STAYING ASLEEP OR SLEEPING TOO MUCH: 0
6. FEELING BAD ABOUT YOURSELF - OR THAT YOU ARE A FAILURE OR HAVE LET YOURSELF OR YOUR FAMILY DOWN: NOT AT ALL

## 2023-10-04 ENCOUNTER — ANCILLARY PROCEDURE (OUTPATIENT)
Dept: RADIOLOGY | Facility: CLINIC | Age: 73
End: 2023-10-04
Payer: COMMERCIAL

## 2023-10-04 ENCOUNTER — INFUSION (OUTPATIENT)
Dept: HEMATOLOGY/ONCOLOGY | Facility: CLINIC | Age: 73
End: 2023-10-04
Payer: COMMERCIAL

## 2023-10-04 VITALS
HEART RATE: 72 BPM | DIASTOLIC BLOOD PRESSURE: 97 MMHG | WEIGHT: 159.39 LBS | TEMPERATURE: 97.7 F | OXYGEN SATURATION: 96 % | HEIGHT: 60 IN | SYSTOLIC BLOOD PRESSURE: 157 MMHG | BODY MASS INDEX: 31.29 KG/M2 | RESPIRATION RATE: 18 BRPM

## 2023-10-04 DIAGNOSIS — Z12.31 ENCOUNTER FOR SCREENING MAMMOGRAM FOR MALIGNANT NEOPLASM OF BREAST: ICD-10-CM

## 2023-10-04 DIAGNOSIS — C50.919 MALIGNANT NEOPLASM OF FEMALE BREAST, UNSPECIFIED ESTROGEN RECEPTOR STATUS, UNSPECIFIED LATERALITY, UNSPECIFIED SITE OF BREAST (MULTI): ICD-10-CM

## 2023-10-04 LAB
ALBUMIN SERPL BCP-MCNC: 4.1 G/DL (ref 3.4–5)
ALP SERPL-CCNC: 36 U/L (ref 33–136)
ALT SERPL W P-5'-P-CCNC: 13 U/L (ref 7–45)
ANION GAP SERPL CALC-SCNC: 11 MMOL/L (ref 10–20)
AST SERPL W P-5'-P-CCNC: 23 U/L (ref 9–39)
BASOPHILS # BLD AUTO: 0.03 X10*3/UL (ref 0–0.1)
BASOPHILS NFR BLD AUTO: 1.3 %
BILIRUB SERPL-MCNC: 0.9 MG/DL (ref 0–1.2)
BUN SERPL-MCNC: 22 MG/DL (ref 6–23)
CALCIUM SERPL-MCNC: 9.3 MG/DL (ref 8.6–10.3)
CHLORIDE SERPL-SCNC: 102 MMOL/L (ref 98–107)
CO2 SERPL-SCNC: 29 MMOL/L (ref 21–32)
CREAT SERPL-MCNC: 1.19 MG/DL (ref 0.5–1.05)
EOSINOPHIL # BLD AUTO: 0.02 X10*3/UL (ref 0–0.4)
EOSINOPHIL NFR BLD AUTO: 0.9 %
ERYTHROCYTE [DISTWIDTH] IN BLOOD BY AUTOMATED COUNT: 17.9 % (ref 11.5–14.5)
GFR SERPL CREATININE-BSD FRML MDRD: 49 ML/MIN/1.73M*2
GLUCOSE SERPL-MCNC: 106 MG/DL (ref 74–99)
HCT VFR BLD AUTO: 25.6 % (ref 36–46)
HGB BLD-MCNC: 7.7 G/DL (ref 12–16)
IMM GRANULOCYTES # BLD AUTO: 0.02 X10*3/UL (ref 0–0.5)
IMM GRANULOCYTES NFR BLD AUTO: 0.9 % (ref 0–0.9)
LYMPHOCYTES # BLD AUTO: 0.89 X10*3/UL (ref 0.8–3)
LYMPHOCYTES NFR BLD AUTO: 38.2 %
MCH RBC QN AUTO: 23.1 PG (ref 26–34)
MCHC RBC AUTO-ENTMCNC: 30.1 G/DL (ref 32–36)
MCV RBC AUTO: 77 FL (ref 80–100)
MONOCYTES # BLD AUTO: 0.26 X10*3/UL (ref 0.05–0.8)
MONOCYTES NFR BLD AUTO: 11.2 %
NEUTROPHILS # BLD AUTO: 1.11 X10*3/UL (ref 1.6–5.5)
NEUTROPHILS NFR BLD AUTO: 47.5 %
NRBC BLD-RTO: 2.6 /100 WBCS (ref 0–0)
PLATELET # BLD AUTO: 217 X10*3/UL (ref 150–450)
PMV BLD AUTO: 9.7 FL (ref 7.5–11.5)
POTASSIUM SERPL-SCNC: 4.2 MMOL/L (ref 3.5–5.3)
PROT SERPL-MCNC: 7.2 G/DL (ref 6.4–8.2)
RBC # BLD AUTO: 3.33 X10*6/UL (ref 4–5.2)
SODIUM SERPL-SCNC: 138 MMOL/L (ref 136–145)
WBC # BLD AUTO: 2.3 X10*3/UL (ref 4.4–11.3)

## 2023-10-04 PROCEDURE — 85025 COMPLETE CBC W/AUTO DIFF WBC: CPT

## 2023-10-04 PROCEDURE — 77063 BREAST TOMOSYNTHESIS BI: CPT | Mod: LEFT SIDE | Performed by: RADIOLOGY

## 2023-10-04 PROCEDURE — 77067 SCR MAMMO BI INCL CAD: CPT | Mod: LT

## 2023-10-04 PROCEDURE — 80053 COMPREHEN METABOLIC PANEL: CPT

## 2023-10-04 PROCEDURE — 36415 COLL VENOUS BLD VENIPUNCTURE: CPT

## 2023-10-04 PROCEDURE — 77063 BREAST TOMOSYNTHESIS BI: CPT | Mod: LT

## 2023-10-04 PROCEDURE — 96402 CHEMO HORMON ANTINEOPL SQ/IM: CPT

## 2023-10-04 PROCEDURE — 2500000004 HC RX 250 GENERAL PHARMACY W/ HCPCS (ALT 636 FOR OP/ED): Mod: JZ,JG | Performed by: INTERNAL MEDICINE

## 2023-10-04 PROCEDURE — 77067 SCR MAMMO BI INCL CAD: CPT | Mod: LEFT SIDE | Performed by: RADIOLOGY

## 2023-10-04 RX ORDER — ALBUTEROL SULFATE 0.83 MG/ML
3 SOLUTION RESPIRATORY (INHALATION) AS NEEDED
Status: DISCONTINUED | OUTPATIENT
Start: 2023-10-04 | End: 2023-10-04 | Stop reason: HOSPADM

## 2023-10-04 RX ORDER — FAMOTIDINE 10 MG/ML
20 INJECTION INTRAVENOUS ONCE AS NEEDED
Status: DISCONTINUED | OUTPATIENT
Start: 2023-10-04 | End: 2023-10-04 | Stop reason: HOSPADM

## 2023-10-04 RX ORDER — DIPHENHYDRAMINE HYDROCHLORIDE 50 MG/ML
50 INJECTION INTRAMUSCULAR; INTRAVENOUS AS NEEDED
Status: DISCONTINUED | OUTPATIENT
Start: 2023-10-04 | End: 2023-10-04 | Stop reason: HOSPADM

## 2023-10-04 RX ORDER — LAMOTRIGINE 25 MG/1
500 TABLET ORAL ONCE
Status: COMPLETED | OUTPATIENT
Start: 2023-10-04 | End: 2023-10-04

## 2023-10-04 RX ORDER — EPINEPHRINE 0.3 MG/.3ML
0.3 INJECTION SUBCUTANEOUS EVERY 5 MIN PRN
Status: DISCONTINUED | OUTPATIENT
Start: 2023-10-04 | End: 2023-10-04 | Stop reason: HOSPADM

## 2023-10-04 RX ADMIN — FULVESTRANT 500 MG: 50 INJECTION, SOLUTION INTRAMUSCULAR at 12:00

## 2023-10-04 ASSESSMENT — ENCOUNTER SYMPTOMS
DEPRESSION: 0
OCCASIONAL FEELINGS OF UNSTEADINESS: 0
LOSS OF SENSATION IN FEET: 0

## 2023-10-04 ASSESSMENT — COLUMBIA-SUICIDE SEVERITY RATING SCALE - C-SSRS
1. IN THE PAST MONTH, HAVE YOU WISHED YOU WERE DEAD OR WISHED YOU COULD GO TO SLEEP AND NOT WAKE UP?: NO
6. HAVE YOU EVER DONE ANYTHING, STARTED TO DO ANYTHING, OR PREPARED TO DO ANYTHING TO END YOUR LIFE?: NO
2. HAVE YOU ACTUALLY HAD ANY THOUGHTS OF KILLING YOURSELF?: NO

## 2023-10-04 ASSESSMENT — PATIENT HEALTH QUESTIONNAIRE - PHQ9
SUM OF ALL RESPONSES TO PHQ9 QUESTIONS 1 AND 2: 0
2. FEELING DOWN, DEPRESSED OR HOPELESS: NOT AT ALL
1. LITTLE INTEREST OR PLEASURE IN DOING THINGS: NOT AT ALL

## 2023-10-04 ASSESSMENT — PAIN SCALES - GENERAL: PAINLEVEL: 0-NO PAIN

## 2023-10-30 DIAGNOSIS — C50.919 MALIGNANT NEOPLASM OF FEMALE BREAST, UNSPECIFIED ESTROGEN RECEPTOR STATUS, UNSPECIFIED LATERALITY, UNSPECIFIED SITE OF BREAST (MULTI): Primary | ICD-10-CM

## 2023-10-30 RX ORDER — DIPHENHYDRAMINE HYDROCHLORIDE 50 MG/ML
50 INJECTION INTRAMUSCULAR; INTRAVENOUS AS NEEDED
Status: CANCELLED | OUTPATIENT
Start: 2023-11-01

## 2023-10-30 RX ORDER — EPINEPHRINE 0.3 MG/.3ML
0.3 INJECTION SUBCUTANEOUS EVERY 5 MIN PRN
Status: CANCELLED | OUTPATIENT
Start: 2023-11-01

## 2023-10-30 RX ORDER — ALBUTEROL SULFATE 0.83 MG/ML
3 SOLUTION RESPIRATORY (INHALATION) AS NEEDED
Status: CANCELLED | OUTPATIENT
Start: 2023-11-01

## 2023-10-30 RX ORDER — FAMOTIDINE 10 MG/ML
20 INJECTION INTRAVENOUS ONCE AS NEEDED
Status: CANCELLED | OUTPATIENT
Start: 2023-11-01

## 2023-10-30 RX ORDER — LAMOTRIGINE 25 MG/1
500 TABLET ORAL ONCE
Status: CANCELLED | OUTPATIENT
Start: 2023-11-01

## 2023-11-01 ENCOUNTER — APPOINTMENT (OUTPATIENT)
Dept: LAB | Facility: CLINIC | Age: 73
End: 2023-11-01
Payer: COMMERCIAL

## 2023-11-01 ENCOUNTER — INFUSION (OUTPATIENT)
Dept: HEMATOLOGY/ONCOLOGY | Facility: CLINIC | Age: 73
End: 2023-11-01
Payer: COMMERCIAL

## 2023-11-01 ENCOUNTER — APPOINTMENT (OUTPATIENT)
Dept: HEMATOLOGY/ONCOLOGY | Facility: CLINIC | Age: 73
End: 2023-11-01
Payer: COMMERCIAL

## 2023-11-01 VITALS
BODY MASS INDEX: 30.73 KG/M2 | DIASTOLIC BLOOD PRESSURE: 87 MMHG | WEIGHT: 159.61 LBS | OXYGEN SATURATION: 98 % | SYSTOLIC BLOOD PRESSURE: 148 MMHG | TEMPERATURE: 97 F | RESPIRATION RATE: 18 BRPM | HEART RATE: 84 BPM

## 2023-11-01 DIAGNOSIS — C50.811 MALIGNANT NEOPLASM OF OVERLAPPING SITES OF RIGHT BREAST (MULTI): ICD-10-CM

## 2023-11-01 DIAGNOSIS — C50.919 MALIGNANT NEOPLASM OF FEMALE BREAST, UNSPECIFIED ESTROGEN RECEPTOR STATUS, UNSPECIFIED LATERALITY, UNSPECIFIED SITE OF BREAST (MULTI): ICD-10-CM

## 2023-11-01 LAB
ALBUMIN SERPL BCP-MCNC: 4.4 G/DL (ref 3.4–5)
ALP SERPL-CCNC: 48 U/L (ref 33–136)
ALT SERPL W P-5'-P-CCNC: 14 U/L (ref 7–45)
ANION GAP SERPL CALC-SCNC: 13 MMOL/L (ref 10–20)
AST SERPL W P-5'-P-CCNC: 24 U/L (ref 9–39)
BASOPHILS # BLD AUTO: 0.04 X10*3/UL (ref 0–0.1)
BASOPHILS NFR BLD AUTO: 1.2 %
BILIRUB SERPL-MCNC: 1 MG/DL (ref 0–1.2)
BUN SERPL-MCNC: 24 MG/DL (ref 6–23)
CALCIUM SERPL-MCNC: 9.9 MG/DL (ref 8.6–10.3)
CHLORIDE SERPL-SCNC: 100 MMOL/L (ref 98–107)
CO2 SERPL-SCNC: 30 MMOL/L (ref 21–32)
CREAT SERPL-MCNC: 1.18 MG/DL (ref 0.5–1.05)
EOSINOPHIL # BLD AUTO: 0.04 X10*3/UL (ref 0–0.4)
EOSINOPHIL NFR BLD AUTO: 1.2 %
ERYTHROCYTE [DISTWIDTH] IN BLOOD BY AUTOMATED COUNT: 17 % (ref 11.5–14.5)
GFR SERPL CREATININE-BSD FRML MDRD: 49 ML/MIN/1.73M*2
GLUCOSE SERPL-MCNC: 117 MG/DL (ref 74–99)
HCT VFR BLD AUTO: 29.4 % (ref 36–46)
HGB BLD-MCNC: 9.1 G/DL (ref 12–16)
IMM GRANULOCYTES # BLD AUTO: 0.02 X10*3/UL (ref 0–0.5)
IMM GRANULOCYTES NFR BLD AUTO: 0.6 % (ref 0–0.9)
LYMPHOCYTES # BLD AUTO: 1.14 X10*3/UL (ref 0.8–3)
LYMPHOCYTES NFR BLD AUTO: 33.5 %
MCH RBC QN AUTO: 23.9 PG (ref 26–34)
MCHC RBC AUTO-ENTMCNC: 31 G/DL (ref 32–36)
MCV RBC AUTO: 77 FL (ref 80–100)
MONOCYTES # BLD AUTO: 0.34 X10*3/UL (ref 0.05–0.8)
MONOCYTES NFR BLD AUTO: 10 %
NEUTROPHILS # BLD AUTO: 1.82 X10*3/UL (ref 1.6–5.5)
NEUTROPHILS NFR BLD AUTO: 53.5 %
NRBC BLD-RTO: 2.1 /100 WBCS (ref 0–0)
PLATELET # BLD AUTO: 219 X10*3/UL (ref 150–450)
POTASSIUM SERPL-SCNC: 3.9 MMOL/L (ref 3.5–5.3)
PROT SERPL-MCNC: 8.2 G/DL (ref 6.4–8.2)
RBC # BLD AUTO: 3.8 X10*6/UL (ref 4–5.2)
SODIUM SERPL-SCNC: 139 MMOL/L (ref 136–145)
WBC # BLD AUTO: 3.4 X10*3/UL (ref 4.4–11.3)

## 2023-11-01 PROCEDURE — 80053 COMPREHEN METABOLIC PANEL: CPT

## 2023-11-01 PROCEDURE — 96402 CHEMO HORMON ANTINEOPL SQ/IM: CPT

## 2023-11-01 PROCEDURE — 36415 COLL VENOUS BLD VENIPUNCTURE: CPT

## 2023-11-01 PROCEDURE — 85025 COMPLETE CBC W/AUTO DIFF WBC: CPT

## 2023-11-01 PROCEDURE — 2500000004 HC RX 250 GENERAL PHARMACY W/ HCPCS (ALT 636 FOR OP/ED): Mod: JZ,JG | Performed by: INTERNAL MEDICINE

## 2023-11-01 RX ORDER — LAMOTRIGINE 25 MG/1
500 TABLET ORAL ONCE
Status: COMPLETED | OUTPATIENT
Start: 2023-11-01 | End: 2023-11-01

## 2023-11-01 RX ORDER — ALBUTEROL SULFATE 0.83 MG/ML
3 SOLUTION RESPIRATORY (INHALATION) AS NEEDED
Status: DISCONTINUED | OUTPATIENT
Start: 2023-11-01 | End: 2023-11-01 | Stop reason: HOSPADM

## 2023-11-01 RX ORDER — FAMOTIDINE 10 MG/ML
20 INJECTION INTRAVENOUS ONCE AS NEEDED
Status: DISCONTINUED | OUTPATIENT
Start: 2023-11-01 | End: 2023-11-01 | Stop reason: HOSPADM

## 2023-11-01 RX ORDER — EPINEPHRINE 0.3 MG/.3ML
0.3 INJECTION SUBCUTANEOUS EVERY 5 MIN PRN
Status: DISCONTINUED | OUTPATIENT
Start: 2023-11-01 | End: 2023-11-01 | Stop reason: HOSPADM

## 2023-11-01 RX ORDER — DIPHENHYDRAMINE HYDROCHLORIDE 50 MG/ML
50 INJECTION INTRAMUSCULAR; INTRAVENOUS AS NEEDED
Status: DISCONTINUED | OUTPATIENT
Start: 2023-11-01 | End: 2023-11-01 | Stop reason: HOSPADM

## 2023-11-01 RX ADMIN — FULVESTRANT 500 MG: 250 INJECTION INTRAMUSCULAR at 14:50

## 2023-11-01 ASSESSMENT — PAIN SCALES - GENERAL: PAINLEVEL: 0-NO PAIN

## 2023-11-27 DIAGNOSIS — C50.919 MALIGNANT NEOPLASM OF FEMALE BREAST, UNSPECIFIED ESTROGEN RECEPTOR STATUS, UNSPECIFIED LATERALITY, UNSPECIFIED SITE OF BREAST (MULTI): Primary | ICD-10-CM

## 2023-11-27 RX ORDER — ALBUTEROL SULFATE 0.83 MG/ML
3 SOLUTION RESPIRATORY (INHALATION) AS NEEDED
Status: CANCELLED | OUTPATIENT
Start: 2023-12-27

## 2023-11-27 RX ORDER — EPINEPHRINE 0.3 MG/.3ML
0.3 INJECTION SUBCUTANEOUS EVERY 5 MIN PRN
Status: CANCELLED | OUTPATIENT
Start: 2023-11-29

## 2023-11-27 RX ORDER — ALBUTEROL SULFATE 0.83 MG/ML
3 SOLUTION RESPIRATORY (INHALATION) AS NEEDED
Status: CANCELLED | OUTPATIENT
Start: 2023-11-29

## 2023-11-27 RX ORDER — FAMOTIDINE 10 MG/ML
20 INJECTION INTRAVENOUS ONCE AS NEEDED
Status: CANCELLED | OUTPATIENT
Start: 2023-11-29

## 2023-11-27 RX ORDER — FAMOTIDINE 10 MG/ML
20 INJECTION INTRAVENOUS ONCE AS NEEDED
Status: CANCELLED | OUTPATIENT
Start: 2023-12-27

## 2023-11-27 RX ORDER — DIPHENHYDRAMINE HYDROCHLORIDE 50 MG/ML
50 INJECTION INTRAMUSCULAR; INTRAVENOUS AS NEEDED
Status: CANCELLED | OUTPATIENT
Start: 2023-12-27

## 2023-11-27 RX ORDER — DIPHENHYDRAMINE HYDROCHLORIDE 50 MG/ML
50 INJECTION INTRAMUSCULAR; INTRAVENOUS AS NEEDED
Status: CANCELLED | OUTPATIENT
Start: 2023-11-29

## 2023-11-27 RX ORDER — LAMOTRIGINE 25 MG/1
500 TABLET ORAL ONCE
Status: CANCELLED | OUTPATIENT
Start: 2023-11-29

## 2023-11-27 RX ORDER — EPINEPHRINE 0.3 MG/.3ML
0.3 INJECTION SUBCUTANEOUS EVERY 5 MIN PRN
Status: CANCELLED | OUTPATIENT
Start: 2023-12-27

## 2023-11-27 RX ORDER — LAMOTRIGINE 25 MG/1
500 TABLET ORAL ONCE
Status: CANCELLED | OUTPATIENT
Start: 2023-12-27

## 2023-11-29 ENCOUNTER — INFUSION (OUTPATIENT)
Dept: HEMATOLOGY/ONCOLOGY | Facility: CLINIC | Age: 73
End: 2023-11-29
Payer: COMMERCIAL

## 2023-11-29 VITALS
WEIGHT: 160.27 LBS | RESPIRATION RATE: 18 BRPM | OXYGEN SATURATION: 99 % | DIASTOLIC BLOOD PRESSURE: 76 MMHG | BODY MASS INDEX: 30.85 KG/M2 | SYSTOLIC BLOOD PRESSURE: 151 MMHG | HEART RATE: 77 BPM | TEMPERATURE: 97.7 F

## 2023-11-29 DIAGNOSIS — C50.919 MALIGNANT NEOPLASM OF FEMALE BREAST, UNSPECIFIED ESTROGEN RECEPTOR STATUS, UNSPECIFIED LATERALITY, UNSPECIFIED SITE OF BREAST (MULTI): ICD-10-CM

## 2023-11-29 PROCEDURE — 2500000004 HC RX 250 GENERAL PHARMACY W/ HCPCS (ALT 636 FOR OP/ED): Mod: JZ,JG | Performed by: INTERNAL MEDICINE

## 2023-11-29 PROCEDURE — 96402 CHEMO HORMON ANTINEOPL SQ/IM: CPT

## 2023-11-29 RX ORDER — LAMOTRIGINE 25 MG/1
500 TABLET ORAL ONCE
Status: COMPLETED | OUTPATIENT
Start: 2023-11-29 | End: 2023-11-29

## 2023-11-29 RX ORDER — DIPHENHYDRAMINE HYDROCHLORIDE 50 MG/ML
50 INJECTION INTRAMUSCULAR; INTRAVENOUS AS NEEDED
Status: DISCONTINUED | OUTPATIENT
Start: 2023-11-29 | End: 2023-11-29 | Stop reason: HOSPADM

## 2023-11-29 RX ORDER — FAMOTIDINE 10 MG/ML
20 INJECTION INTRAVENOUS ONCE AS NEEDED
Status: DISCONTINUED | OUTPATIENT
Start: 2023-11-29 | End: 2023-11-29 | Stop reason: HOSPADM

## 2023-11-29 RX ORDER — ALBUTEROL SULFATE 0.83 MG/ML
3 SOLUTION RESPIRATORY (INHALATION) AS NEEDED
Status: DISCONTINUED | OUTPATIENT
Start: 2023-11-29 | End: 2023-11-29 | Stop reason: HOSPADM

## 2023-11-29 RX ORDER — EPINEPHRINE 0.3 MG/.3ML
0.3 INJECTION SUBCUTANEOUS EVERY 5 MIN PRN
Status: DISCONTINUED | OUTPATIENT
Start: 2023-11-29 | End: 2023-11-29 | Stop reason: HOSPADM

## 2023-11-29 RX ADMIN — FULVESTRANT 500 MG: 50 INJECTION, SOLUTION INTRAMUSCULAR at 12:53

## 2023-11-29 ASSESSMENT — PAIN SCALES - GENERAL: PAINLEVEL: 6

## 2023-12-04 ENCOUNTER — TELEPHONE (OUTPATIENT)
Dept: HEMATOLOGY/ONCOLOGY | Facility: HOSPITAL | Age: 73
End: 2023-12-04

## 2023-12-04 ENCOUNTER — NURSE TRIAGE (OUTPATIENT)
Dept: ADMISSION | Facility: HOSPITAL | Age: 73
End: 2023-12-04
Payer: COMMERCIAL

## 2023-12-04 DIAGNOSIS — M79.89 SWELLING OF LEFT FOOT: ICD-10-CM

## 2023-12-04 DIAGNOSIS — M79.675 PAIN AND SWELLING OF TOE OF LEFT FOOT: Primary | ICD-10-CM

## 2023-12-04 DIAGNOSIS — M79.89 PAIN AND SWELLING OF TOE OF LEFT FOOT: Primary | ICD-10-CM

## 2023-12-04 NOTE — TELEPHONE ENCOUNTER
The patient states that her left foot has begun to bother her, over the weekend she noticed a lump on the top of the food and it is hurting her to walk, she denied injury but does note slight swelling. This started a few weeks ago but this weekend really started to effect her walking, unsure if she needs to see a podiatrist or get an ultrasound. No discoloration of the foot, also no SOB or chest pain, please advise.         Additional Information   Commented on: Where is your swelling?     Slightly puffy left foot   Commented on: Do you have pain in the area that's swollen? On a scale of 0-10 (0 being no pain and 10 being the worst possible) how would you rate your pain?     With walking   Commented on: How long have they been going on?     The pain started to be noticeable over the weekend    Protocols used: Swelling/Deep Venous Thrombosis (DVT)

## 2023-12-05 NOTE — TELEPHONE ENCOUNTER
The patient was contacted, I got her VM. I left a message that an US was ordered and that she should call scheduling to set this up. I also encouraged her to call our office back if she has any further questions or concerns regarding the POC

## 2023-12-06 ENCOUNTER — CLINICAL SUPPORT (OUTPATIENT)
Dept: VASCULAR MEDICINE | Facility: CLINIC | Age: 73
End: 2023-12-06
Payer: COMMERCIAL

## 2023-12-06 DIAGNOSIS — M79.89 SWELLING OF LEFT FOOT: ICD-10-CM

## 2023-12-06 PROCEDURE — 93971 EXTREMITY STUDY: CPT | Performed by: INTERNAL MEDICINE

## 2023-12-06 PROCEDURE — 93971 EXTREMITY STUDY: CPT

## 2023-12-08 ENCOUNTER — TELEPHONE (OUTPATIENT)
Dept: HEMATOLOGY/ONCOLOGY | Facility: HOSPITAL | Age: 73
End: 2023-12-08
Payer: COMMERCIAL

## 2023-12-08 DIAGNOSIS — R22.42 LOCALIZED SWELLING OF LEFT FOOT: Primary | ICD-10-CM

## 2023-12-08 NOTE — TELEPHONE ENCOUNTER
LVM regarding negative DVT scan but call back number provided to discuss next steps with regards to foot swelling. Please massage when call is returned

## 2023-12-08 NOTE — TELEPHONE ENCOUNTER
----- Message from Rah Slater MD sent at 12/8/2023 10:50 AM EST -----  I don't know if she needs a scan for that LN- she called in with a bump and swelling of her left foot. Could she have an infection? That LN could just be reactive from whatever is going on in the foot. Now that we know it's not a clot, I think she should see someone for the foot problem that is causing her symptoms.    ----- Message -----  From: Kellen Lamb PA-C  Sent: 12/8/2023   9:36 AM EST  To: Rha Slater MD    Hi - no DVT however she does have a LN in the groin that's measuring 1.64 mm x 0.94 mm. She will be due for repeat CT CAP and bone scan soon, my thought is ordering this now to have completed prior to her next appt. She is seeing me dec 27th. Any other thoughts?   ----- Message -----  From: Kee Syngo - Cardiology Results In  Sent: 12/8/2023   9:13 AM EST  To: Kellen Lamb PA-C

## 2023-12-08 NOTE — TELEPHONE ENCOUNTER
Pt called back, discussed negative DVT scan. No localized redness, warmth or discoloration. She noted it has been going on for ~ 1.5 weeks, pain has improved some. C/f possible cyst vs. Lower suspicion for infection. Will place STAT podiatry referral. All questions and concerns addressed. Will see pt at the end of dec

## 2023-12-18 ENCOUNTER — HOSPITAL ENCOUNTER (OUTPATIENT)
Dept: RADIOLOGY | Facility: CLINIC | Age: 73
End: 2023-12-18
Payer: COMMERCIAL

## 2023-12-18 ENCOUNTER — OFFICE VISIT (OUTPATIENT)
Dept: ORTHOPEDIC SURGERY | Facility: CLINIC | Age: 73
End: 2023-12-18
Payer: COMMERCIAL

## 2023-12-18 ENCOUNTER — HOSPITAL ENCOUNTER (OUTPATIENT)
Dept: RADIOLOGY | Facility: CLINIC | Age: 73
Discharge: HOME | End: 2023-12-18
Payer: COMMERCIAL

## 2023-12-18 VITALS — BODY MASS INDEX: 30.85 KG/M2 | WEIGHT: 160.27 LBS

## 2023-12-18 DIAGNOSIS — M79.672 PAIN IN BOTH FEET: Primary | ICD-10-CM

## 2023-12-18 DIAGNOSIS — M79.673 FOOT PAIN: ICD-10-CM

## 2023-12-18 DIAGNOSIS — M20.11 HALLUX VALGUS WITH BUNIONS OF RIGHT FOOT: ICD-10-CM

## 2023-12-18 DIAGNOSIS — M79.671 PAIN IN BOTH FEET: Primary | ICD-10-CM

## 2023-12-18 DIAGNOSIS — M21.611 HALLUX VALGUS WITH BUNIONS OF RIGHT FOOT: ICD-10-CM

## 2023-12-18 DIAGNOSIS — M21.612 HALLUX VALGUS WITH BUNIONS OF LEFT FOOT: ICD-10-CM

## 2023-12-18 DIAGNOSIS — M20.12 HALLUX VALGUS WITH BUNIONS OF LEFT FOOT: ICD-10-CM

## 2023-12-18 DIAGNOSIS — M19.071 PRIMARY OSTEOARTHRITIS OF RIGHT FOOT: ICD-10-CM

## 2023-12-18 PROCEDURE — 3008F BODY MASS INDEX DOCD: CPT | Performed by: ORTHOPAEDIC SURGERY

## 2023-12-18 PROCEDURE — 73630 X-RAY EXAM OF FOOT: CPT | Mod: BILATERAL PROCEDURE | Performed by: ORTHOPAEDIC SURGERY

## 2023-12-18 PROCEDURE — 1160F RVW MEDS BY RX/DR IN RCRD: CPT | Performed by: ORTHOPAEDIC SURGERY

## 2023-12-18 PROCEDURE — 99203 OFFICE O/P NEW LOW 30 MIN: CPT | Performed by: ORTHOPAEDIC SURGERY

## 2023-12-18 PROCEDURE — 99213 OFFICE O/P EST LOW 20 MIN: CPT | Performed by: ORTHOPAEDIC SURGERY

## 2023-12-18 PROCEDURE — 73630 X-RAY EXAM OF FOOT: CPT | Mod: 50,FY

## 2023-12-18 PROCEDURE — 1125F AMNT PAIN NOTED PAIN PRSNT: CPT | Performed by: ORTHOPAEDIC SURGERY

## 2023-12-18 PROCEDURE — 1159F MED LIST DOCD IN RCRD: CPT | Performed by: ORTHOPAEDIC SURGERY

## 2023-12-18 PROCEDURE — 1036F TOBACCO NON-USER: CPT | Performed by: ORTHOPAEDIC SURGERY

## 2023-12-18 ASSESSMENT — PATIENT HEALTH QUESTIONNAIRE - PHQ9
2. FEELING DOWN, DEPRESSED OR HOPELESS: NOT AT ALL
1. LITTLE INTEREST OR PLEASURE IN DOING THINGS: NOT AT ALL
SUM OF ALL RESPONSES TO PHQ9 QUESTIONS 1 AND 2: 0

## 2023-12-18 ASSESSMENT — PAIN DESCRIPTION - DESCRIPTORS: DESCRIPTORS: TENDER

## 2023-12-18 ASSESSMENT — ENCOUNTER SYMPTOMS
DEPRESSION: 0
LOSS OF SENSATION IN FEET: 0
OCCASIONAL FEELINGS OF UNSTEADINESS: 0

## 2023-12-18 ASSESSMENT — PAIN - FUNCTIONAL ASSESSMENT: PAIN_FUNCTIONAL_ASSESSMENT: 0-10

## 2023-12-18 ASSESSMENT — LIFESTYLE VARIABLES: TOTAL SCORE: 0

## 2023-12-18 ASSESSMENT — PAIN SCALES - GENERAL: PAINLEVEL_OUTOF10: 5 - MODERATE PAIN

## 2023-12-18 NOTE — PROGRESS NOTES
Subjective      Chief Complaint   Patient presents with    Left Foot - Pain    Right Foot - Pain        No surgery found     HPI  This 73 year old woman presents today with left and right foot pain. She states that this left and right foot pain has been present for years. She states that it is worse with and aggravated by prolonged walking. Of note the patient has a history of breast cancer and colon cancer. She is currently being treated with chemotherapy.     CARDIOLOGY:   Negative for chest pain, shortness of breath.   RESPIRATORY:   Negative for chest pain, shortness of breath.   MUSCULOSKELETAL:   See HPI for details.   NEUROLOGY:     This patient has neuropathy with numbness of her feet that she says has been present since she has started chemotherapy    Objective    There were no vitals filed for this visit.    Physical Exam  GENERAL:          General Appearance:  This is a pleasant patient with appropriate affect, in no acute distress.   DERMATOLOGY:          Skin: skin at the neck, upper and lower back, and trunk is intact. There is no evidence of skin rash, skin breakdown or ulceration, or atrophic skin change.   EXTREMITIES:          Vascular:  Right, left hands and feet are warm with good color and pulses. Right and left calf and thigh are nontender and nonswollen.   NEUROLOGICAL:          Orientation:  Patient is alert and oriented to person, place, time and situation.  this patient has some  decreased sensation to light touchat her feet which she attributes to neuropathy secondary to her chemotherapy.  Otherwise, right and left upper and lower extremity motor and sensory examinations are intact.      MUSCULOSKELETAL: Neck: Nontender. No pain with range of motion. Right and left feet: There is a deformity at the first metatarsal of the right and left feet consistent with hallux valgus. Nontender at the left and right ankles. Patient is seen walking today with a stable gait.  X-rays of the left and right  feet done and read in the office today show bunion deformities of the left and right feet and osteoarthritis of the left and right feet but I do not see any evidence of destruction.  I reviewed these x-rays and also  at the patient's request multiple imaging x-rays that this patient has had at  for evaluation of her cancer with the patient in the office today.    Lower extremity venous duplex left    Result Date: 12/8/2023           [place] [id number]            Phone [phone]  Vascular Lab Report  [facility] US LOWER EXTREMITY VENOUS DUPLEX LEFT Patient Name:      CARMEN Sumner Physician: [id number] Lady Jefferson MD Study Date:        12/6/2023           Ordering Provider: [id number] TODD PEACE MRN/PID:           [id number]            Fellow: Accession#:        [id number]        Technologist:      Cayla Najera RVT Date of Birth/Age: 1950 / 73      Technologist 2:                    years Gender:            F                   Encounter#:        [id number] Admission Status:  Outpatient          Location           [facility]                                        Performed:  Diagnosis/ICD: Other specified soft tissue disorders-M79.89 CPT Codes:     88847 Peripheral venous duplex scan for DVT Limited  CONCLUSIONS: Right Lower Venous: Right common femoral vein is negative for deep vein thrombus. Left Lower Venous: No evidence of acute deep vein thrombus visualized in the left lower extremity. Additional Findings; There is a non-vascular lymph node in the groin measuring 1.64 cm x 0.94 cm.  Imaging & Doppler Findings:  Left                  Compress Thrombus        Flow Distal External Iliac   Yes      None   Spontaneous/Phasic CFV                     Yes      None   Spontaneous/Phasic PFV                     Yes      None FV Proximal             Yes      None   Spontaneous/Phasic FV  Mid                  Yes      None FV Distal               Yes      None Popliteal               Yes      None   Spontaneous/Phasic Peroneal                Yes      None PTV                     Yes      None  [id number] Lady Jefferson MD Electronically signed by [id number] Lady Jefferson MD on 12/8/2023 at 9:13:35 AM  ** Final **      AP and lateral x-rays of the left and right foot show a hallux valgus deformity of the left and right first metatarsals. No evidence of bone metastasis.     Santy was seen today for pain and pain.  Diagnoses and all orders for this visit:  Pain in both feet (Primary)  Hallux valgus with bunions of left foot  Hallux valgus with bunions of right foot  Primary osteoarthritis of right foot     Options are discussed with the patient in detail. The patient is instructed regarding activity modification and risk for further injury with falling or trauma, wearing comfortable shoewear, physician directed at home gentle strengthening and ROM exercises, and the appropriate use of Tylenol as needed for pain with its potential adverse reactions and side effects. The patient understands.  at her request I have given her the name of a podiatrist for further evaluation and treatment recommendations. Please note that this report has been produced using speech recognition software.  It may contain errors related to grammar, punctuation or spelling.  Electronically signed, but not reviewed.    Yonny Harding MD

## 2023-12-26 NOTE — PROGRESS NOTES
"Patient ID: Santy Bordy is a 73 y.o. female.    The patient presents to clinic today for her history of breast cancer.     Cancer Staging   Malignant neoplasm of female breast (CMS/HCC)  Staging form: Breast, AJCC 8th Edition  - Pathologic: Stage IV (cM1) - Unsigned        Diagnostic/Therapeutic History:  - She presented with breast cancer presented in 1991 with a right-sided breast lesion. She had a local recurrence in 1998 in the axilla measuring 3 cm.  Her-2/smith was positive as was hormone receptors. It was never clear if this was breast tissue or seamus tissue is her recurrence. Biopsies showed metastatic disease from the left iliac bone on February 12, 2013. It was consistent with breast carcinoma  with estrogen and progesterone receptors positive and HER-2/smith negative. It also appears that PITER-3 is positive as well.   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 A T-cell gene receptor arrangement study did suggest a clone, and a bone marrow biopsy was negative. Her staging studies were otherwise negative.   In the context of evaluating for metastatic disease, she underwent a PET scan where 2 areas in the colon. She has since undergone a colonoscopy, which was abnormal in 2 separate areas. Biopsy proven disease now exist in the proximal ascending colon with  a moderately differentiated adenocarcinoma as well as the sigmoid. KRAS assessment on the tumor has not been done. She has now had a definitive resection.  ERBB2 G, Missense\" class=\"variant-details ellipsis\" _[id number]-[id number]=\"\"L869R, 2606T>G, Missense   PIK3CA A, Missense\" class=\"variant-details ellipsis\" _gfxpkxjol-bvu-l642=\"\"E545K, 1633G>A, Missense   DNMT3A K468fs*1, 1402_1415delAAGGAGATTATTGA, Frameshift   TET2 L3363lf*19, 3353delA, Frameshift.        Treatment History:    1991-0-0: Cancer Related Surgery: Right mastectomy and axillary node evaluation in 1991 1991-0-0: Disease " Assessment- Breast: Initial right-sided breast cancer diagnosed in 1991, the pathology of which, I have not been able to obtain. In 1998 she had  right axillary recurrence with 3 cm worth of tumor noted.  It was unclear if margins were  obtained or if was axiallry only tissue  1991-0-0: Chemotherapy: Six cycles of Adriamycin based chemotherapy as adjuvant      treatment in 1991.     1998-0-0: Radiation Therapy: Right axillary versus right upper quadrant base re-occurrence,      status post resection and right chest wall radiation in 1998.     1998-0-0: Cancer Related Surgery: 1998 Right axillary recurrence-3cm  2002-0-0: Hormonal Therapy: Tamoxifen was taken from 1998 to 2002 2002-7-1: New Treatment Plan: Anastrazole 2002-2008 2006-0-0: Radiation Therapy: Localized  XRT to RUE after resection of Tcell NHL  2006-12-0: Cancer Related Surgery: RUE subcutaneous mass c/w peripheral T cell NHL  2014-2-7: Disease Assessment-colon: AFter PET+ findings a colonoscopy revealed dz in 2 separate areas.  Biopsy proven  disease now exist in the proximal ascending colon with a moderately  differentiated adenocarcinoma as well as the sigmoid.  KRAS   has not been done.     2014-2-12: First Relapse Date-systemic: left iliac bone on February 12, 2013.  It was  consistent with breast carcinoma with estrogen and progesterone  receptors positive and HER-2/smith negative.  2014-3-20: Chemotherapy: Capecitabine 50 mg b.i.d. status post 2 cycles, her last March  20, 2014  2014-3-28: New Treatment Plan: Abraxane 260 mg/m2  2014-7-28: New Follow-up Plan: Abraxane stopped.  Exemestane initiated  2014-9-4: Cancer Related Surgery: R hemicolectomy  2016-2-19: New Treatment Plan: Exemestane stopped/ letrozole initiated  2016-3-4: Miscellaneous: Palbociclib initiated  2022-1-5: New Treatment Plan: Letrozole/Ibrance stopped  with new bone disease noted  2022-1-20: Chemotherapy: fulvestrant start  2022-2-3: Chemotherapy: Abemiciclib start    History  of Present Illness (HPI)/Interval History:  Ms. Brody presents for presents today for follow-up, treatment and surveillance. She is compliant on q 28 d fulvestrant and daily abemaciclib.   No new symptoms. Doing well.   Continues to have occasional diarrhea which she takes Imodium (not daily). Improved since dropping abemaciclib to 100 mg BID.   Neuropathy is the same/stable. Taking B12 and B6.   Denies bony pain, fever, chills, CP, SOB, AP, N/V,  changes. Some mild ELI, thinking of moving to a one story place.     Of note, saw Ortho surgery for bilateral foot pain, found to have Hallux valgus with bunions on bilateral feet along eith primary OA of the right foot. Referred to podiatry.     Review of Systems:  14-point ROS otherwise negative, as per HPI.    Past Medical History:   Diagnosis Date    Abnormal finding of blood chemistry, unspecified 10/30/2018    Abnormal blood chemistry    Abnormal findings on diagnostic imaging of other specified body structures 01/10/2014    Abnormal finding on imaging    Acute upper respiratory infection, unspecified 05/21/2019    Upper respiratory infection, viral    Bilateral foot pain     Body mass index (BMI) 29.0-29.9, adult 08/18/2022    BMI 29.0-29.9,adult    Body mass index (BMI) 29.0-29.9, adult 02/17/2022    BMI 29.0-29.9,adult    Body mass index (BMI)30.0-30.9, adult 09/14/2021    BMI 30.0-30.9,adult    Encounter for immunization     COVID-19 vaccine administered    Encounter for screening for malignant neoplasm of colon 07/11/2022    Screen for colon cancer    Encounter for screening mammogram for malignant neoplasm of breast     Visit for screening mammogram    Hx antineoplastic chemo 1992    Localized edema 01/14/2020    Pedal edema    Malignant neoplasm of colon, unspecified (CMS/HCC) 06/23/2015    Adenocarcinoma of colon    Malignant neoplasm of unspecified site of unspecified female breast (CMS/HCC) 04/18/2021    Adenocarcinoma of breast    Other conditions  influencing health status     DEXA Body Composition Study    Personal history of diseases of the skin and subcutaneous tissue 06/23/2016    History of acne    Personal history of diseases of the skin and subcutaneous tissue 04/23/2015    History of impetigo    Personal history of irradiation 1992    Personal history of other diseases of the female genital tract     History of endometriosis    Personal history of other diseases of the nervous system and sense organs 09/14/2021    History of restless legs syndrome    Personal history of other diseases of the respiratory system 02/24/2016    History of paranasal sinus congestion    Personal history of other endocrine, nutritional and metabolic disease 12/17/2014    History of obesity    Personal history of other infectious and parasitic diseases 07/29/2015    History of herpes zoster    Personal history of other malignant neoplasm of large intestine 02/17/2022    History of other malignant neoplasm of large intestine    Personal history of other specified conditions 02/24/2016    History of postnasal drip    Personal history of other specified conditions 01/02/2014    History of fatigue    Personal history of other specified conditions 01/02/2014    History of shortness of breath    Stress fracture, unspecified femur, initial encounter for fracture 08/24/2017    Stress fracture of femoral shaft    Unspecified open wound, right lower leg, initial encounter 09/10/2019    Leg wound, right       Past Surgical History:   Procedure Laterality Date    COLECTOMY  12/19/2016    Partial Colectomy    COLONOSCOPY  06/26/2013    Complete Colonoscopy    MASTECTOMY  07/31/2013    Breast Surgery Mastectomy    OTHER SURGICAL HISTORY  07/31/2013    Oophorectomy - Bilateral (Removal Of Both Ovaries)    OTHER SURGICAL HISTORY  07/31/2013    Arm Excision Of Soft Tissue Tumor    OTHER SURGICAL HISTORY  07/31/2013    Breast Surgery Reconstruction    OTHER SURGICAL HISTORY  02/19/2020     Laparoscopic endometrioma fulguration       Social History     Socioeconomic History    Marital status:      Spouse name: None    Number of children: None    Years of education: None    Highest education level: None   Occupational History    None   Tobacco Use    Smoking status: Former     Packs/day: .25     Types: Cigarettes     Quit date:      Years since quittin.0    Smokeless tobacco: Never   Vaping Use    Vaping Use: Never used   Substance and Sexual Activity    Alcohol use: Not Currently     Comment: socially    Drug use: Never    Sexual activity: Defer   Other Topics Concern    None   Social History Narrative    None     Social Determinants of Health     Financial Resource Strain: Not on file   Food Insecurity: Not on file   Transportation Needs: Not on file   Physical Activity: Not on file   Stress: Not on file   Social Connections: Not on file   Intimate Partner Violence: Not on file   Housing Stability: Not on file       Allergies   Allergen Reactions    Denosumab Unknown and Other     Xgeva: Adverse Reaction: Osteoporosis with fracture    Should avoid all Biphosphonates         Current Outpatient Medications:     abemaciclib (Verzenio) 100 mg tablet, Take 1 tablet (100 mg total) by mouth 2 times a day.  Swallow whole., Disp: 56 tablet, Rfl: 2    calcium carbonate-vitamin D3 600 mg-5 mcg (200 unit) tablet, Take 1 tablet by mouth 2 times a day., Disp: , Rfl:     cranberry extract 200 mg capsule, Take by mouth., Disp: , Rfl:     fenofibrate (Tricor) 54 mg tablet, Take 1 tablet (54 mg) by mouth once daily in the morning. Take before meals., Disp: 90 tablet, Rfl: 1    fluticasone (Flonase) 50 mcg/actuation nasal spray, Administer 2 sprays into each nostril once daily., Disp: , Rfl:     furosemide (Lasix) 20 mg tablet, TAKE ONE TABLET BY MOUTH EVERY DAY, Disp: 90 tablet, Rfl: 1    gabapentin (Neurontin) 100 mg capsule, Take 1 capsule (100 mg) by mouth once daily at bedtime., Disp: 90 capsule,  Rfl: 1    lisinopril 10 mg tablet, TAKE ONE TABLET BY MOUTH EVERY DAY, Disp: 30 tablet, Rfl: 5    omega-3/dha/epa/fish oil (OMEGA-3 ORAL), Take by mouth. Omega 3 EPA + DHA 1000 MG Oral Capsule; Take 2 capsules by mouth twice daily., Disp: , Rfl:     triamcinolone (Kenalog) 0.1 % cream, Apply 1 Film topically in the morning and 1 Film before bedtime., Disp: , Rfl:     TURMERIC ORAL, , Disp: , Rfl:      Objective    BSA: 1.77 meters squared  /78 (BP Location: Left arm, Patient Position: Sitting, BP Cuff Size: Adult)   Pulse 89   Temp 36.8 °C (98.2 °F) (Temporal)   Resp 18   Wt 73.4 kg (161 lb 13.1 oz)   SpO2 96%   BMI 31.15 kg/m²     Performance Status:  The ECOG performance scale today is ECO- Restricted in physically strenuous activity.  Carries out light duty.    Physical Exam  Constitutional:       General: She is not in acute distress.     Appearance: Normal appearance. She is not ill-appearing, toxic-appearing or diaphoretic.   HENT:      Head: Normocephalic and atraumatic.   Eyes:      Extraocular Movements: Extraocular movements intact.      Conjunctiva/sclera: Conjunctivae normal.   Cardiovascular:      Rate and Rhythm: Normal rate and regular rhythm.   Pulmonary:      Effort: Pulmonary effort is normal. No respiratory distress.   Musculoskeletal:         General: No swelling, tenderness or deformity.      Cervical back: Normal range of motion. No rigidity.      Comments: Mild lymphedema    Skin:     General: Skin is warm and dry.   Neurological:      General: No focal deficit present.      Mental Status: She is alert and oriented to person, place, and time. Mental status is at baseline.      Motor: No weakness.      Gait: Gait normal.   Psychiatric:         Mood and Affect: Mood normal.         Behavior: Behavior normal.         Thought Content: Thought content normal.         Laboratory Data:  Lab Results   Component Value Date    WBC 3.4 (L) 2023    HGB 9.1 (L) 2023    HCT 29.4  (L) 11/01/2023    MCV 77 (L) 11/01/2023     11/01/2023    ANC 1.03 (L) 03/09/2023       Chemistry    Lab Results   Component Value Date/Time     11/01/2023 1338    K 3.9 11/01/2023 1338     11/01/2023 1338    CO2 30 11/01/2023 1338    BUN 24 (H) 11/01/2023 1338    CREATININE 1.18 (H) 11/01/2023 1338    Lab Results   Component Value Date/Time    CALCIUM 9.9 11/01/2023 1338    ALKPHOS 48 11/01/2023 1338    AST 24 11/01/2023 1338    ALT 14 11/01/2023 1338    BILITOT 1.0 11/01/2023 1338             Radiology:  Lower extremity venous duplex left             [facility]  [id number] [place] [address]             Phone [phone]       Vascular Lab Report     VASC US LOWER EXTREMITY VENOUS DUPLEX LEFT    Patient Name:      CARMEN Sumner Physician: [id number] Lady Jefferson MD  Study Date:        12/6/2023           Ordering Provider: [id number] TODD PEACE  MRN/PID:           [id number]            Fellow:  Accession#:        [id number]        Technologist:      Cayla Najera RVT  Date of Birth/Age: 1950 / 73      Technologist 2:                     years  Gender:            F                   Encounter#:        [id number]  Admission Status:  Outpatient          Location           [facility]                                         Performed:       Diagnosis/ICD: Other specified soft tissue disorders-M79.89  CPT Codes:     45964 Peripheral venous duplex scan for DVT Limited       CONCLUSIONS:  Right Lower Venous: Right common femoral vein is negative for deep vein thrombus.  Left Lower Venous: No evidence of acute deep vein thrombus visualized in the left lower extremity. Additional Findings; There is a non-vascular lymph node in the groin measuring 1.64 cm x 0.94 cm.     Imaging & Doppler Findings:     Left                  Compress Thrombus        Flow  Distal External Iliac   Yes      None    Spontaneous/Phasic  CFV                     Yes      None   Spontaneous/Phasic  PFV                     Yes      None  FV Proximal             Yes      None   Spontaneous/Phasic  FV Mid                  Yes      None  FV Distal               Yes      None  Popliteal               Yes      None   Spontaneous/Phasic  Peroneal                Yes      None  PTV                     Yes      None       [id number] Lady Jefferson MD  Electronically signed by [id number] Lady Jefferson MD on 12/8/2023 at 9:13:35 AM       ** Final **       BI mammo left screening tomosynthesis 10/04/2023    Narrative  Interpreted By:  Jonas Mendez,  STUDY:  BI MAMMO LEFT SCREENING TOMOSYNTHESIS; 10/4/2023 3:45 pm    ACCESSION NUMBER(S):  [id number]    ORDERING CLINICIAN:  МАРИЯ FRANCO    INDICATION:  Screening. History of right mastectomy    COMPARISON:  07/17/2013, 09/11/2015    FINDINGS:  2D and tomosynthesis images were reviewed at 1 mm slice thickness.    Density:  There are areas of scattered fibroglandular tissue.    No significant change from prior studies.  No suspicious masses or  calcifications are identified.    Impression  No mammographic evidence of malignancy.    BI-RADS CATEGORY:    BI-RADS Category:  1 Negative.  Recommendation:  Routine Screening Mammogram in 1 Year.  Recommended Date:  1 Year.  Laterality:  Bilateral.          For any future breast imaging appointments, please call [phone] ([phone]).      MACRO:  None    Signed by: Jonas Mendez 10/5/2023 9:38 AM  Dictation workstation:   [id number]          Assessment/Plan:    Santy Brody is a 73 y.o. female with a history of metastatic (lung and bone involvement) breast cancer, who presents today follow-up evaluation.  1. Stage IV breast cancer: She has lung and bone involvement.  - Currently on fulvestrant and abemaciclib, tolerating well with only side effect being diarrhea.   - Continue abemaciclib 100 mg BID (dose-reduced for anemia).  - Grade 1 diarrhea. Takes Imodium  occasionally.   - Grade 1 neuropathy in feet. Stable.  - CT CAP and bone scan from 9/5/23- stable disease but showed new L2 compression fracture. MRI showed fx but no spinal cord compression. Referred to endocrinology by Dr. Shivani Slater MD   - Continue fulvestrant 500 mg IM q4 weeks  - Recheck CA27-29 at visit today   - CT CAP and bone scan placed to be completed in March 2024 prior to next OV      Genomics for liquid bx (Foundation 1) 1/2022.  1. PIK3A  2. ERBB2  3. MSI- undetermined  4. VUS -- BRCA2 and ESR     2. Iron overload, anemia:   Hemoglobin electrophoresis shows beta-thalassemia minor.  - Ferritin previously elevated. Will check with labs today   - Will start PO folic acid      3. Bone metastasis:  She is not a candidate for further bisphosphonate; will defer to endocrinology      4. Colon cancer:  No disease activity suspected. Most recent CEA is normal.   - Colonoscopy referral placed      5. Lymphedema:  Stable. Encouraged compression sleeve use.              Plan:    - RTC ~12 weeks after restaging scans   Assess/Plan SmartLinks:   Problem List Items Addressed This Visit             ICD-10-CM    Cancer of sigmoid colon (CMS/HCC) C18.7    Relevant Orders    Colonoscopy Screening; High Risk Patient; hx of colon cancer    Malignant neoplasm of female breast (CMS/HCC) - Primary C50.919    Relevant Orders    [facility]    Infusion Appointment Request [place] [address] INFUSON    [facility]    Cl[facility]    CT chest abdomen pelvis w IV contrast    NM bone 3 phase     Other Visit Diagnoses         Codes    Carcinoma of breast metastatic to bone, unspecified laterality (CMS/HCC)     C50.919, C79.51    Relevant Orders    Infusion Appointment Request [facility] 3 INFUSON    Infusion Appointment Request [place] [address] INFUSON    Infusion Appointment Request [place] [address]  INFUSON    Clinic Appointment Request SHIVANI ABBASI; [facility] HC3 MEDONC1    CT chest abdomen pelvis w IV contrast    NM bone 3 phase            Orders Placed This Encounter   Procedures    CT chest abdomen pelvis w IV contrast     Standing Status:   Future     Standing Expiration Date:   12/27/2024     Order Specific Question:   Reason for exam:     Answer:   restaging breast cancer scans     Order Specific Question:   Radiologist to Determine Optimal Study     Answer:   Yes     Order Specific Question:   Release result to MyChart     Answer:   Immediate     Order Specific Question:   Is this exam part of a Research Study? If Yes, link this order to the research study     Answer:   No    NM bone 3 phase     Standing Status:   Future     Standing Expiration Date:   12/27/2024     Order Specific Question:   Reason for exam:     Answer:   restaging breast cancer scans     Order Specific Question:   Radiologist to Determine Optimal Study     Answer:   Yes     Order Specific Question:   Release result to MyChart     Answer:   Immediate [1]     Order Specific Question:   Is this exam part of a Research Study? If Yes, link this order to the research study     Answer:   No             Kellen Lamb PA-C  Hematology and Medical Oncology  [facility]

## 2023-12-27 ENCOUNTER — APPOINTMENT (OUTPATIENT)
Dept: HEMATOLOGY/ONCOLOGY | Facility: CLINIC | Age: 73
End: 2023-12-27
Payer: COMMERCIAL

## 2023-12-27 ENCOUNTER — INFUSION (OUTPATIENT)
Dept: HEMATOLOGY/ONCOLOGY | Facility: CLINIC | Age: 73
End: 2023-12-27
Payer: COMMERCIAL

## 2023-12-27 ENCOUNTER — OFFICE VISIT (OUTPATIENT)
Dept: HEMATOLOGY/ONCOLOGY | Facility: CLINIC | Age: 73
End: 2023-12-27
Payer: COMMERCIAL

## 2023-12-27 VITALS
DIASTOLIC BLOOD PRESSURE: 78 MMHG | HEART RATE: 89 BPM | BODY MASS INDEX: 31.15 KG/M2 | WEIGHT: 161.82 LBS | SYSTOLIC BLOOD PRESSURE: 144 MMHG | RESPIRATION RATE: 18 BRPM | OXYGEN SATURATION: 96 % | TEMPERATURE: 98.2 F

## 2023-12-27 DIAGNOSIS — C79.51 CARCINOMA OF BREAST METASTATIC TO BONE, UNSPECIFIED LATERALITY (MULTI): ICD-10-CM

## 2023-12-27 DIAGNOSIS — C18.7 CANCER OF SIGMOID COLON (MULTI): ICD-10-CM

## 2023-12-27 DIAGNOSIS — C50.919 MALIGNANT NEOPLASM OF FEMALE BREAST, UNSPECIFIED ESTROGEN RECEPTOR STATUS, UNSPECIFIED LATERALITY, UNSPECIFIED SITE OF BREAST (MULTI): Primary | ICD-10-CM

## 2023-12-27 DIAGNOSIS — C50.811 MALIGNANT NEOPLASM OF OVERLAPPING SITES OF RIGHT BREAST (MULTI): ICD-10-CM

## 2023-12-27 DIAGNOSIS — C50.919 CARCINOMA OF BREAST METASTATIC TO BONE, UNSPECIFIED LATERALITY (MULTI): ICD-10-CM

## 2023-12-27 DIAGNOSIS — C50.919 MALIGNANT NEOPLASM OF FEMALE BREAST, UNSPECIFIED ESTROGEN RECEPTOR STATUS, UNSPECIFIED LATERALITY, UNSPECIFIED SITE OF BREAST (MULTI): ICD-10-CM

## 2023-12-27 LAB
ALBUMIN SERPL BCP-MCNC: 4.3 G/DL (ref 3.4–5)
ALP SERPL-CCNC: 46 U/L (ref 33–136)
ALT SERPL W P-5'-P-CCNC: 14 U/L (ref 7–45)
ANION GAP SERPL CALC-SCNC: 12 MMOL/L (ref 10–20)
AST SERPL W P-5'-P-CCNC: 26 U/L (ref 9–39)
BASO STIPL BLD QL SMEAR: PRESENT
BASOPHILS # BLD MANUAL: 0.12 X10*3/UL (ref 0–0.1)
BASOPHILS NFR BLD MANUAL: 5 %
BILIRUB SERPL-MCNC: 0.8 MG/DL (ref 0–1.2)
BUN SERPL-MCNC: 25 MG/DL (ref 6–23)
CALCIUM SERPL-MCNC: 9.4 MG/DL (ref 8.6–10.3)
CANCER AG27-29 SERPL-ACNC: 73.2 U/ML (ref 0–38.6)
CEA SERPL-MCNC: 2.2 UG/L
CHLORIDE SERPL-SCNC: 98 MMOL/L (ref 98–107)
CO2 SERPL-SCNC: 28 MMOL/L (ref 21–32)
CREAT SERPL-MCNC: 1.5 MG/DL (ref 0.5–1.05)
DACRYOCYTES BLD QL SMEAR: ABNORMAL
EOSINOPHIL # BLD MANUAL: 0.02 X10*3/UL (ref 0–0.4)
EOSINOPHIL NFR BLD MANUAL: 1 %
ERYTHROCYTE [DISTWIDTH] IN BLOOD BY AUTOMATED COUNT: 16.2 % (ref 11.5–14.5)
FERRITIN SERPL-MCNC: 1272 NG/ML (ref 8–150)
GFR SERPL CREATININE-BSD FRML MDRD: 37 ML/MIN/1.73M*2
GLUCOSE SERPL-MCNC: 118 MG/DL (ref 74–99)
HCT VFR BLD AUTO: 27.9 % (ref 36–46)
HGB BLD-MCNC: 8.6 G/DL (ref 12–16)
HYPOCHROMIA BLD QL SMEAR: ABNORMAL
IMM GRANULOCYTES # BLD AUTO: 0.01 X10*3/UL (ref 0–0.5)
IMM GRANULOCYTES NFR BLD AUTO: 0.4 % (ref 0–0.9)
IRON SATN MFR SERPL: 42 % (ref 25–45)
IRON SERPL-MCNC: 179 UG/DL (ref 35–150)
LYMPHOCYTES # BLD MANUAL: 0.99 X10*3/UL (ref 0.8–3)
LYMPHOCYTES NFR BLD MANUAL: 43 %
MCH RBC QN AUTO: 23.1 PG (ref 26–34)
MCHC RBC AUTO-ENTMCNC: 30.8 G/DL (ref 32–36)
MCV RBC AUTO: 75 FL (ref 80–100)
MONOCYTES # BLD MANUAL: 0.23 X10*3/UL (ref 0.05–0.8)
MONOCYTES NFR BLD MANUAL: 10 %
NEUTROPHILS # BLD MANUAL: 0.94 X10*3/UL (ref 1.6–5.5)
NEUTS BAND # BLD MANUAL: 0.02 X10*3/UL (ref 0–0.5)
NEUTS BAND NFR BLD MANUAL: 1 %
NEUTS SEG # BLD MANUAL: 0.92 X10*3/UL (ref 1.6–5)
NEUTS SEG NFR BLD MANUAL: 40 %
NRBC BLD-RTO: 1.3 /100 WBCS (ref 0–0)
OVALOCYTES BLD QL SMEAR: ABNORMAL
PLATELET # BLD AUTO: 253 X10*3/UL (ref 150–450)
PLATELET CLUMP BLD QL SMEAR: PRESENT
POLYCHROMASIA BLD QL SMEAR: ABNORMAL
POTASSIUM SERPL-SCNC: 4.1 MMOL/L (ref 3.5–5.3)
PROT SERPL-MCNC: 8 G/DL (ref 6.4–8.2)
RBC # BLD AUTO: 3.73 X10*6/UL (ref 4–5.2)
RBC MORPH BLD: ABNORMAL
SCHISTOCYTES BLD QL SMEAR: ABNORMAL
SODIUM SERPL-SCNC: 134 MMOL/L (ref 136–145)
TIBC SERPL-MCNC: 423 UG/DL (ref 240–445)
TOTAL CELLS COUNTED BLD: 100
UIBC SERPL-MCNC: 244 UG/DL (ref 110–370)
WBC # BLD AUTO: 2.3 X10*3/UL (ref 4.4–11.3)

## 2023-12-27 PROCEDURE — 82728 ASSAY OF FERRITIN: CPT | Performed by: INTERNAL MEDICINE

## 2023-12-27 PROCEDURE — 85027 COMPLETE CBC AUTOMATED: CPT | Performed by: INTERNAL MEDICINE

## 2023-12-27 PROCEDURE — 3078F DIAST BP <80 MM HG: CPT

## 2023-12-27 PROCEDURE — 96372 THER/PROPH/DIAG INJ SC/IM: CPT | Performed by: INTERNAL MEDICINE

## 2023-12-27 PROCEDURE — 99215 OFFICE O/P EST HI 40 MIN: CPT

## 2023-12-27 PROCEDURE — 2500000004 HC RX 250 GENERAL PHARMACY W/ HCPCS (ALT 636 FOR OP/ED): Mod: JZ,JG | Performed by: INTERNAL MEDICINE

## 2023-12-27 PROCEDURE — 84075 ASSAY ALKALINE PHOSPHATASE: CPT | Performed by: INTERNAL MEDICINE

## 2023-12-27 PROCEDURE — 1160F RVW MEDS BY RX/DR IN RCRD: CPT

## 2023-12-27 PROCEDURE — 96402 CHEMO HORMON ANTINEOPL SQ/IM: CPT

## 2023-12-27 PROCEDURE — 1126F AMNT PAIN NOTED NONE PRSNT: CPT

## 2023-12-27 PROCEDURE — 83540 ASSAY OF IRON: CPT | Performed by: INTERNAL MEDICINE

## 2023-12-27 PROCEDURE — 1036F TOBACCO NON-USER: CPT

## 2023-12-27 PROCEDURE — 36415 COLL VENOUS BLD VENIPUNCTURE: CPT

## 2023-12-27 PROCEDURE — 3008F BODY MASS INDEX DOCD: CPT

## 2023-12-27 PROCEDURE — 1159F MED LIST DOCD IN RCRD: CPT

## 2023-12-27 PROCEDURE — 82378 CARCINOEMBRYONIC ANTIGEN: CPT | Performed by: INTERNAL MEDICINE

## 2023-12-27 PROCEDURE — 86300 IMMUNOASSAY TUMOR CA 15-3: CPT | Performed by: INTERNAL MEDICINE

## 2023-12-27 PROCEDURE — 3077F SYST BP >= 140 MM HG: CPT

## 2023-12-27 PROCEDURE — 85007 BL SMEAR W/DIFF WBC COUNT: CPT | Performed by: INTERNAL MEDICINE

## 2023-12-27 RX ORDER — HEPARIN 100 UNIT/ML
500 SYRINGE INTRAVENOUS AS NEEDED
Status: DISCONTINUED | OUTPATIENT
Start: 2023-12-27 | End: 2023-12-27 | Stop reason: HOSPADM

## 2023-12-27 RX ORDER — ALBUTEROL SULFATE 0.83 MG/ML
3 SOLUTION RESPIRATORY (INHALATION) AS NEEDED
Status: DISCONTINUED | OUTPATIENT
Start: 2023-12-27 | End: 2023-12-27 | Stop reason: HOSPADM

## 2023-12-27 RX ORDER — HEPARIN 100 UNIT/ML
500 SYRINGE INTRAVENOUS AS NEEDED
OUTPATIENT
Start: 2023-12-27

## 2023-12-27 RX ORDER — EPINEPHRINE 0.3 MG/.3ML
0.3 INJECTION SUBCUTANEOUS EVERY 5 MIN PRN
Status: DISCONTINUED | OUTPATIENT
Start: 2023-12-27 | End: 2023-12-27 | Stop reason: HOSPADM

## 2023-12-27 RX ORDER — HEPARIN SODIUM,PORCINE/PF 10 UNIT/ML
50 SYRINGE (ML) INTRAVENOUS AS NEEDED
OUTPATIENT
Start: 2023-12-27

## 2023-12-27 RX ORDER — LAMOTRIGINE 25 MG/1
500 TABLET ORAL ONCE
Status: COMPLETED | OUTPATIENT
Start: 2023-12-27 | End: 2023-12-27

## 2023-12-27 RX ORDER — HEPARIN SODIUM,PORCINE/PF 10 UNIT/ML
50 SYRINGE (ML) INTRAVENOUS AS NEEDED
Status: DISCONTINUED | OUTPATIENT
Start: 2023-12-27 | End: 2023-12-27 | Stop reason: HOSPADM

## 2023-12-27 RX ORDER — DIPHENHYDRAMINE HYDROCHLORIDE 50 MG/ML
50 INJECTION INTRAMUSCULAR; INTRAVENOUS AS NEEDED
Status: DISCONTINUED | OUTPATIENT
Start: 2023-12-27 | End: 2023-12-27 | Stop reason: HOSPADM

## 2023-12-27 RX ORDER — FLUTICASONE PROPIONATE 50 MCG
2 SPRAY, SUSPENSION (ML) NASAL DAILY
COMMUNITY
Start: 2023-12-14

## 2023-12-27 RX ORDER — FAMOTIDINE 10 MG/ML
20 INJECTION INTRAVENOUS ONCE AS NEEDED
Status: DISCONTINUED | OUTPATIENT
Start: 2023-12-27 | End: 2023-12-27 | Stop reason: HOSPADM

## 2023-12-27 RX ADMIN — FULVESTRANT 500 MG: 50 INJECTION, SOLUTION INTRAMUSCULAR at 14:12

## 2023-12-27 ASSESSMENT — ENCOUNTER SYMPTOMS
OCCASIONAL FEELINGS OF UNSTEADINESS: 0
DEPRESSION: 0
LOSS OF SENSATION IN FEET: 0

## 2023-12-27 ASSESSMENT — PAIN SCALES - GENERAL: PAINLEVEL: 0-NO PAIN

## 2023-12-27 NOTE — PROGRESS NOTES
Pt seen in office today for a  treatment follow up visit with LAYO Myles  for management of her breast cancer.  She is  without complaints today and denies pain.  She is scheduled  to receive her treatment of Faslodex injection following her FUV today.    Medications, pharmacy preference and allergies were reviewed with patient and updated in the medical record.     Per orders, she has her infusion appointments for her Faslodex injections for January, February and March of 2024 with a FUV with Dr. Slater on 3/20/24 with her injection. She is to have a CT c/a/p and a bone scan. She is also to have a colonoscopy to screen for colon cancer.     Our contact information was given to patient and they were encouraged to contact us with any questions or concerns.     Patient verbalized understanding and agreement regarding discussed information via verbal feedback. Pt escorted to Day Treatment.

## 2023-12-29 ENCOUNTER — TELEPHONE (OUTPATIENT)
Dept: HEMATOLOGY/ONCOLOGY | Facility: HOSPITAL | Age: 73
End: 2023-12-29
Payer: COMMERCIAL

## 2023-12-29 ENCOUNTER — TELEPHONE (OUTPATIENT)
Dept: ADMISSION | Facility: HOSPITAL | Age: 73
End: 2023-12-29
Payer: COMMERCIAL

## 2023-12-29 ENCOUNTER — TELEPHONE (OUTPATIENT)
Dept: HEMATOLOGY/ONCOLOGY | Facility: CLINIC | Age: 73
End: 2023-12-29
Payer: COMMERCIAL

## 2023-12-29 DIAGNOSIS — Z17.0 MALIGNANT NEOPLASM OF BREAST IN FEMALE, ESTROGEN RECEPTOR POSITIVE, UNSPECIFIED LATERALITY, UNSPECIFIED SITE OF BREAST (MULTI): Primary | ICD-10-CM

## 2023-12-29 DIAGNOSIS — D70.1 CHEMOTHERAPY INDUCED NEUTROPENIA (CMS-HCC): Primary | ICD-10-CM

## 2023-12-29 DIAGNOSIS — C50.919 MALIGNANT NEOPLASM OF BREAST IN FEMALE, ESTROGEN RECEPTOR POSITIVE, UNSPECIFIED LATERALITY, UNSPECIFIED SITE OF BREAST (MULTI): Primary | ICD-10-CM

## 2023-12-29 DIAGNOSIS — T45.1X5A CHEMOTHERAPY INDUCED NEUTROPENIA (CMS-HCC): Primary | ICD-10-CM

## 2023-12-29 NOTE — TELEPHONE ENCOUNTER
Returned call to [facility] at [phone] to confirm the new dosing of Santy's verzenio 100mg BID. Pharmacist was made aware that this was a dose reduction done today as the result of abnormal lab values. Per pharmacist, medication is in stock and is to be refilled.

## 2023-12-29 NOTE — TELEPHONE ENCOUNTER
Attempted to call pt x 2 left a voicemail informing her of low ANC <1000. Dr. Rah Slater MD would like to have pt hold Verzenio x 1 week and will send in for 50 mg BID. Will have pt recheck CBC next week as well. Orders placed for this. Instructed to call us back to confirm she is okay with this plan. If she has questions please inform this provider

## 2023-12-29 NOTE — TELEPHONE ENCOUNTER
----- Message from Rah Slater MD sent at 12/28/2023  9:43 PM EST -----  Hi Kellen-  ANC <1000 for this patient.  I think we should have her hold for a week, then decrease Verzenio to 50 mg BID.  Can you call her and let her know the plan? I'm happy to send in a new script if you confirm with her that she is in agreement.  THANKS!  ----- Message -----  From: Lab, Background User  Sent: 12/27/2023   1:55 PM EST  To: Rah Slater MD       RF [id number]

## 2023-12-29 NOTE — TELEPHONE ENCOUNTER
[facility] called back in. They ran a claim for patient's Verzenio and her insurance requires her to fill it through [facility].

## 2024-01-08 ENCOUNTER — TELEPHONE (OUTPATIENT)
Dept: ADMISSION | Facility: HOSPITAL | Age: 74
End: 2024-01-08
Payer: COMMERCIAL

## 2024-01-08 DIAGNOSIS — C50.919 CARCINOMA OF BREAST METASTATIC TO BONE, UNSPECIFIED LATERALITY (MULTI): Primary | ICD-10-CM

## 2024-01-08 DIAGNOSIS — C79.51 CARCINOMA OF BREAST METASTATIC TO BONE, UNSPECIFIED LATERALITY (MULTI): Primary | ICD-10-CM

## 2024-01-08 DIAGNOSIS — C78.00 CARCINOMA OF BREAST METASTATIC TO LUNG, UNSPECIFIED LATERALITY (MULTI): ICD-10-CM

## 2024-01-08 DIAGNOSIS — C50.919 CARCINOMA OF BREAST METASTATIC TO LUNG, UNSPECIFIED LATERALITY (MULTI): ICD-10-CM

## 2024-01-08 NOTE — TELEPHONE ENCOUNTER
Pt checking to verify infusion dates are correct.   Next infusion 1/24, 2/27, then 3/20  Pt concerned that Jan to Feb is 5 weeks and not 4.   Please advise.

## 2024-01-13 DIAGNOSIS — I89.0 LYMPHEDEMA OF RIGHT ARM: ICD-10-CM

## 2024-01-15 RX ORDER — FUROSEMIDE 20 MG/1
TABLET ORAL
Qty: 90 TABLET | Refills: 0 | Status: SHIPPED | OUTPATIENT
Start: 2024-01-15

## 2024-01-22 DIAGNOSIS — C50.919 MALIGNANT NEOPLASM OF FEMALE BREAST, UNSPECIFIED ESTROGEN RECEPTOR STATUS, UNSPECIFIED LATERALITY, UNSPECIFIED SITE OF BREAST (MULTI): Primary | ICD-10-CM

## 2024-01-22 RX ORDER — FAMOTIDINE 10 MG/ML
20 INJECTION INTRAVENOUS ONCE AS NEEDED
Status: CANCELLED | OUTPATIENT
Start: 2024-01-24

## 2024-01-22 RX ORDER — EPINEPHRINE 0.3 MG/.3ML
0.3 INJECTION SUBCUTANEOUS EVERY 5 MIN PRN
Status: CANCELLED | OUTPATIENT
Start: 2024-01-24

## 2024-01-22 RX ORDER — DIPHENHYDRAMINE HYDROCHLORIDE 50 MG/ML
50 INJECTION INTRAMUSCULAR; INTRAVENOUS AS NEEDED
Status: CANCELLED | OUTPATIENT
Start: 2024-01-24

## 2024-01-22 RX ORDER — ALBUTEROL SULFATE 0.83 MG/ML
3 SOLUTION RESPIRATORY (INHALATION) AS NEEDED
Status: CANCELLED | OUTPATIENT
Start: 2024-01-24

## 2024-01-23 DIAGNOSIS — C50.919 MALIGNANT NEOPLASM OF FEMALE BREAST, UNSPECIFIED ESTROGEN RECEPTOR STATUS, UNSPECIFIED LATERALITY, UNSPECIFIED SITE OF BREAST (MULTI): Primary | ICD-10-CM

## 2024-01-23 RX ORDER — FAMOTIDINE 10 MG/ML
20 INJECTION INTRAVENOUS ONCE AS NEEDED
Status: CANCELLED | OUTPATIENT
Start: 2024-02-21

## 2024-01-23 RX ORDER — DIPHENHYDRAMINE HYDROCHLORIDE 50 MG/ML
50 INJECTION INTRAMUSCULAR; INTRAVENOUS AS NEEDED
Status: CANCELLED | OUTPATIENT
Start: 2024-02-21

## 2024-01-23 RX ORDER — LAMOTRIGINE 25 MG/1
500 TABLET ORAL ONCE
Status: CANCELLED | OUTPATIENT
Start: 2024-02-21

## 2024-01-23 RX ORDER — EPINEPHRINE 0.3 MG/.3ML
0.3 INJECTION SUBCUTANEOUS EVERY 5 MIN PRN
Status: CANCELLED | OUTPATIENT
Start: 2024-02-21

## 2024-01-23 RX ORDER — ALBUTEROL SULFATE 0.83 MG/ML
3 SOLUTION RESPIRATORY (INHALATION) AS NEEDED
Status: CANCELLED | OUTPATIENT
Start: 2024-02-21

## 2024-01-23 RX ORDER — LAMOTRIGINE 25 MG/1
500 TABLET ORAL ONCE
Status: CANCELLED | OUTPATIENT
Start: 2024-01-24

## 2024-01-24 ENCOUNTER — INFUSION (OUTPATIENT)
Dept: HEMATOLOGY/ONCOLOGY | Facility: CLINIC | Age: 74
End: 2024-01-24
Payer: COMMERCIAL

## 2024-01-24 VITALS
DIASTOLIC BLOOD PRESSURE: 91 MMHG | HEART RATE: 85 BPM | OXYGEN SATURATION: 98 % | TEMPERATURE: 96.8 F | BODY MASS INDEX: 31.02 KG/M2 | SYSTOLIC BLOOD PRESSURE: 143 MMHG | RESPIRATION RATE: 18 BRPM | WEIGHT: 161.16 LBS

## 2024-01-24 DIAGNOSIS — C50.919 CARCINOMA OF BREAST METASTATIC TO BONE, UNSPECIFIED LATERALITY (MULTI): ICD-10-CM

## 2024-01-24 DIAGNOSIS — C50.919 MALIGNANT NEOPLASM OF FEMALE BREAST, UNSPECIFIED ESTROGEN RECEPTOR STATUS, UNSPECIFIED LATERALITY, UNSPECIFIED SITE OF BREAST (MULTI): ICD-10-CM

## 2024-01-24 DIAGNOSIS — C79.51 CARCINOMA OF BREAST METASTATIC TO BONE, UNSPECIFIED LATERALITY (MULTI): ICD-10-CM

## 2024-01-24 PROBLEM — R05.3 COUGH, PERSISTENT: Status: RESOLVED | Noted: 2023-02-03 | Resolved: 2024-01-24

## 2024-01-24 PROBLEM — H90.11 CONDUCTIVE HEARING LOSS OF RIGHT EAR: Status: ACTIVE | Noted: 2024-01-24

## 2024-01-24 PROBLEM — H61.21 IMPACTED CERUMEN OF RIGHT EAR: Status: ACTIVE | Noted: 2024-01-24

## 2024-01-24 PROBLEM — Z96.1 PSEUDOPHAKIA OF LEFT EYE: Status: ACTIVE | Noted: 2024-01-24

## 2024-01-24 PROBLEM — H90.3 BILATERAL SENSORINEURAL HEARING LOSS: Status: ACTIVE | Noted: 2024-01-24

## 2024-01-24 PROBLEM — H53.8 BLURRED VISION, BILATERAL: Status: RESOLVED | Noted: 2023-02-03 | Resolved: 2024-01-24

## 2024-01-24 PROBLEM — Z97.3 WEARS CONTACT LENSES: Status: RESOLVED | Noted: 2023-02-03 | Resolved: 2024-01-24

## 2024-01-24 PROBLEM — J30.9 ALLERGIC RHINITIS: Status: ACTIVE | Noted: 2024-01-24

## 2024-01-24 PROBLEM — H93.13 TINNITUS OF BOTH EARS: Status: ACTIVE | Noted: 2024-01-24

## 2024-01-24 PROBLEM — H25.813 COMBINED FORM OF AGE-RELATED CATARACT, BOTH EYES: Status: RESOLVED | Noted: 2023-02-03 | Resolved: 2024-01-24

## 2024-01-24 PROBLEM — H26.9 CATARACT: Status: ACTIVE | Noted: 2024-01-24

## 2024-01-24 LAB
ALBUMIN SERPL BCP-MCNC: 4.2 G/DL (ref 3.4–5)
ALP SERPL-CCNC: 42 U/L (ref 33–136)
ALT SERPL W P-5'-P-CCNC: 12 U/L (ref 7–45)
ANION GAP SERPL CALC-SCNC: 13 MMOL/L (ref 10–20)
AST SERPL W P-5'-P-CCNC: 23 U/L (ref 9–39)
BASOPHILS # BLD AUTO: 0.04 X10*3/UL (ref 0–0.1)
BASOPHILS NFR BLD AUTO: 1.5 %
BILIRUB SERPL-MCNC: 0.8 MG/DL (ref 0–1.2)
BUN SERPL-MCNC: 20 MG/DL (ref 6–23)
CALCIUM SERPL-MCNC: 9.6 MG/DL (ref 8.6–10.3)
CHLORIDE SERPL-SCNC: 97 MMOL/L (ref 98–107)
CO2 SERPL-SCNC: 28 MMOL/L (ref 21–32)
CREAT SERPL-MCNC: 1.32 MG/DL (ref 0.5–1.05)
EGFRCR SERPLBLD CKD-EPI 2021: 43 ML/MIN/1.73M*2
EOSINOPHIL # BLD AUTO: 0.06 X10*3/UL (ref 0–0.4)
EOSINOPHIL NFR BLD AUTO: 2.2 %
ERYTHROCYTE [DISTWIDTH] IN BLOOD BY AUTOMATED COUNT: 15.6 % (ref 11.5–14.5)
GLUCOSE SERPL-MCNC: 116 MG/DL (ref 74–99)
HCT VFR BLD AUTO: 28.2 % (ref 36–46)
HGB BLD-MCNC: 8.7 G/DL (ref 12–16)
IMM GRANULOCYTES # BLD AUTO: 0.02 X10*3/UL (ref 0–0.5)
IMM GRANULOCYTES NFR BLD AUTO: 0.7 % (ref 0–0.9)
LYMPHOCYTES # BLD AUTO: 0.79 X10*3/UL (ref 0.8–3)
LYMPHOCYTES NFR BLD AUTO: 29.6 %
MCH RBC QN AUTO: 22.9 PG (ref 26–34)
MCHC RBC AUTO-ENTMCNC: 30.9 G/DL (ref 32–36)
MCV RBC AUTO: 74 FL (ref 80–100)
MONOCYTES # BLD AUTO: 0.4 X10*3/UL (ref 0.05–0.8)
MONOCYTES NFR BLD AUTO: 15 %
NEUTROPHILS # BLD AUTO: 1.36 X10*3/UL (ref 1.6–5.5)
NEUTROPHILS NFR BLD AUTO: 51 %
NRBC BLD-RTO: 0.7 /100 WBCS (ref 0–0)
PLATELET # BLD AUTO: 230 X10*3/UL (ref 150–450)
POTASSIUM SERPL-SCNC: 4.3 MMOL/L (ref 3.5–5.3)
PROT SERPL-MCNC: 7.9 G/DL (ref 6.4–8.2)
RBC # BLD AUTO: 3.8 X10*6/UL (ref 4–5.2)
SODIUM SERPL-SCNC: 134 MMOL/L (ref 136–145)
WBC # BLD AUTO: 2.7 X10*3/UL (ref 4.4–11.3)

## 2024-01-24 PROCEDURE — 96402 CHEMO HORMON ANTINEOPL SQ/IM: CPT

## 2024-01-24 PROCEDURE — 36415 COLL VENOUS BLD VENIPUNCTURE: CPT

## 2024-01-24 PROCEDURE — 2500000004 HC RX 250 GENERAL PHARMACY W/ HCPCS (ALT 636 FOR OP/ED): Mod: JZ,JG

## 2024-01-24 PROCEDURE — 80053 COMPREHEN METABOLIC PANEL: CPT

## 2024-01-24 PROCEDURE — 85025 COMPLETE CBC W/AUTO DIFF WBC: CPT

## 2024-01-24 PROCEDURE — 96372 THER/PROPH/DIAG INJ SC/IM: CPT | Mod: 59

## 2024-01-24 RX ORDER — ALBUTEROL SULFATE 0.83 MG/ML
3 SOLUTION RESPIRATORY (INHALATION) AS NEEDED
Status: DISCONTINUED | OUTPATIENT
Start: 2024-01-24 | End: 2024-01-24 | Stop reason: HOSPADM

## 2024-01-24 RX ORDER — DIPHENHYDRAMINE HYDROCHLORIDE 50 MG/ML
50 INJECTION INTRAMUSCULAR; INTRAVENOUS AS NEEDED
Status: DISCONTINUED | OUTPATIENT
Start: 2024-01-24 | End: 2024-01-24 | Stop reason: HOSPADM

## 2024-01-24 RX ORDER — EPINEPHRINE 0.3 MG/.3ML
0.3 INJECTION SUBCUTANEOUS EVERY 5 MIN PRN
Status: DISCONTINUED | OUTPATIENT
Start: 2024-01-24 | End: 2024-01-24 | Stop reason: HOSPADM

## 2024-01-24 RX ORDER — FAMOTIDINE 10 MG/ML
20 INJECTION INTRAVENOUS ONCE AS NEEDED
Status: DISCONTINUED | OUTPATIENT
Start: 2024-01-24 | End: 2024-01-24 | Stop reason: HOSPADM

## 2024-01-24 RX ORDER — LAMOTRIGINE 25 MG/1
500 TABLET ORAL ONCE
Status: COMPLETED | OUTPATIENT
Start: 2024-01-24 | End: 2024-01-24

## 2024-01-24 RX ADMIN — FULVESTRANT 500 MG: 50 INJECTION, SOLUTION INTRAMUSCULAR at 15:12

## 2024-01-24 ASSESSMENT — PAIN SCALES - GENERAL: PAINLEVEL: 0-NO PAIN

## 2024-01-29 ENCOUNTER — TELEPHONE (OUTPATIENT)
Dept: ADMISSION | Facility: HOSPITAL | Age: 74
End: 2024-01-29

## 2024-01-29 ENCOUNTER — OFFICE VISIT (OUTPATIENT)
Dept: ENDOCRINOLOGY | Facility: CLINIC | Age: 74
End: 2024-01-29
Payer: COMMERCIAL

## 2024-01-29 VITALS
DIASTOLIC BLOOD PRESSURE: 91 MMHG | SYSTOLIC BLOOD PRESSURE: 142 MMHG | WEIGHT: 163 LBS | HEART RATE: 85 BPM | BODY MASS INDEX: 31.38 KG/M2

## 2024-01-29 DIAGNOSIS — R73.03 PREDIABETES: ICD-10-CM

## 2024-01-29 DIAGNOSIS — M81.0 OSTEOPOROSIS, UNSPECIFIED OSTEOPOROSIS TYPE, UNSPECIFIED PATHOLOGICAL FRACTURE PRESENCE: Primary | ICD-10-CM

## 2024-01-29 PROCEDURE — 1036F TOBACCO NON-USER: CPT | Performed by: INTERNAL MEDICINE

## 2024-01-29 PROCEDURE — 3080F DIAST BP >= 90 MM HG: CPT | Performed by: INTERNAL MEDICINE

## 2024-01-29 PROCEDURE — 1126F AMNT PAIN NOTED NONE PRSNT: CPT | Performed by: INTERNAL MEDICINE

## 2024-01-29 PROCEDURE — 1159F MED LIST DOCD IN RCRD: CPT | Performed by: INTERNAL MEDICINE

## 2024-01-29 PROCEDURE — 3077F SYST BP >= 140 MM HG: CPT | Performed by: INTERNAL MEDICINE

## 2024-01-29 PROCEDURE — 99205 OFFICE O/P NEW HI 60 MIN: CPT | Performed by: INTERNAL MEDICINE

## 2024-01-29 PROCEDURE — 3008F BODY MASS INDEX DOCD: CPT | Performed by: INTERNAL MEDICINE

## 2024-01-29 NOTE — PROGRESS NOTES
"History Of Present Illness  Santy Brody is a 73 y.o. female with diagnosis of prediabetes known for \"a long time\"    Patient does not know why she was referred and there is no referral on file.     No anti-hyperglycemic medication  No dietitian referral   Watching sugars and starches    No home glucose testing    Current therapy for breast cancer 1991  Returned metastatic to colon 2014  Metastatic to sacrum    Verzenio    History of lymphedema  History of edema    Last eye exam:  2023    At the conclusion of this evaluation, I learned she was actually here for osteoporosis.    Osteoporosis diagnosed shortly after breast cancer    L2 compression fracture (2022)  Right femur fracture on Fosamax and/or Xgeva  (2017)  She does not know the duration of Fosamax, possibly 5 years    Vitamin D 15 mcg in multivitamin  Calcium 1000 mg      Past Medical History  She has a past medical history of Abnormal finding of blood chemistry, unspecified (10/30/2018), Abnormal findings on diagnostic imaging of other specified body structures (01/10/2014), Acute upper respiratory infection, unspecified (05/21/2019), Bilateral foot pain, Body mass index (BMI) 29.0-29.9, adult (08/18/2022), Body mass index (BMI) 29.0-29.9, adult (02/17/2022), Body mass index (BMI)30.0-30.9, adult (09/14/2021), Encounter for immunization, Encounter for screening for malignant neoplasm of colon (07/11/2022), Encounter for screening mammogram for malignant neoplasm of breast, antineoplastic chemo (1992), Localized edema (01/14/2020), Malignant neoplasm of colon, unspecified (CMS/HCC) (06/23/2015), Malignant neoplasm of unspecified site of unspecified female breast (CMS/HCC) (04/18/2021), Other conditions influencing health status, Personal history of diseases of the skin and subcutaneous tissue (06/23/2016), Personal history of diseases of the skin and subcutaneous tissue (04/23/2015), Personal history of irradiation (1992), Personal history of other diseases " of the female genital tract, Personal history of other diseases of the nervous system and sense organs (09/14/2021), Personal history of other diseases of the respiratory system (02/24/2016), Personal history of other endocrine, nutritional and metabolic disease (12/17/2014), Personal history of other infectious and parasitic diseases (07/29/2015), Personal history of other malignant neoplasm of large intestine (02/17/2022), Personal history of other specified conditions (02/24/2016), Personal history of other specified conditions (01/02/2014), Personal history of other specified conditions (01/02/2014), Stress fracture, unspecified femur, initial encounter for fracture (08/24/2017), and Unspecified open wound, right lower leg, initial encounter (09/10/2019).    Surgical History  She has a past surgical history that includes Colonoscopy (06/26/2013); Mastectomy (07/31/2013); Other surgical history (07/31/2013); Other surgical history (07/31/2013); Other surgical history (07/31/2013); Colectomy (12/19/2016); and Other surgical history (02/19/2020).     Social History  She reports that she quit smoking about 40 years ago. Her smoking use included cigarettes. She smoked an average of .25 packs per day. She has never used smokeless tobacco. She reports that she does not currently use alcohol. She reports that she does not use drugs.    Family History  Family History   Problem Relation Name Age of Onset    Hyperlipidemia Mother      Hypertension Mother      Hypertension Sister      Diabetes Other Maternal Aunt        Medications  Current Outpatient Medications   Medication Instructions    abemaciclib (VERZENIO) 50 mg, oral, 2 times daily, Swallow whole.    calcium carbonate-vitamin D3 600 mg-5 mcg (200 unit) tablet 1 tablet, oral, 2 times daily    cranberry extract 200 mg capsule oral    fenofibrate (TRICOR) 54 mg, oral, Daily before breakfast    fluticasone (Flonase) 50 mcg/actuation nasal spray 2 sprays, Each Nostril,  Daily    furosemide (Lasix) 20 mg tablet TAKE ONE TABLET BY MOUTH EVERY DAY    gabapentin (NEURONTIN) 100 mg, oral, Nightly    lisinopril 10 mg, oral, Daily    omega-3/dha/epa/fish oil (OMEGA-3 ORAL) oral, Omega 3 EPA + DHA 1000 MG Oral Capsule; Take 2 capsules by mouth twice daily.    triamcinolone (Kenalog) 0.1 % cream 1 Film, Topical, 2 times daily    TURMERIC ORAL No dose, route, or frequency recorded.       Allergies  Denosumab    Review of Systems   Constitutional:  Positive for unexpected weight change (gain).   HENT:  Positive for hearing loss and tinnitus.    Eyes:  Negative for visual disturbance.   Respiratory:  Negative for shortness of breath.    Endocrine:        As above   Genitourinary:  Negative for difficulty urinating and frequency.   Musculoskeletal:  Negative for arthralgias and back pain.   Skin:         Dry skin     Neurological:  Negative for headaches.        Neuropathy   Psychiatric/Behavioral:  Negative for dysphoric mood. The patient is not nervous/anxious.          Last Recorded Vitals  Blood pressure (!) 142/91, pulse 85, weight 73.9 kg (163 lb).    Physical Exam  Constitutional:       General: She is not in acute distress.  HENT:      Head: Normocephalic.      Mouth/Throat:      Mouth: Mucous membranes are moist.   Eyes:      Extraocular Movements: Extraocular movements intact.   Neck:      Thyroid: No thyromegaly.   Cardiovascular:      Pulses:           Radial pulses are 2+ on the right side and 2+ on the left side.   Musculoskeletal:      Right lower leg: No edema.      Left lower leg: No edema.      Comments: Normal spine curvature   Neurological:      Mental Status: She is alert.      Motor: No tremor.   Psychiatric:         Mood and Affect: Affect normal.         Cognition and Memory: Memory is impaired.          Relevant Results  Glucose (mg/dL)   Date Value   01/24/2024 116 (H)   12/27/2023 118 (H)   11/01/2023 117 (H)     Hemoglobin A1C (%)   Date Value   09/06/2023 5.6    12/30/2021 6.0 (A)   03/08/2021 5.9     Bicarbonate (mmol/L)   Date Value   01/24/2024 28   12/27/2023 28   11/01/2023 30     Urea Nitrogen (mg/dL)   Date Value   01/24/2024 20   12/27/2023 25 (H)   11/01/2023 24 (H)     Creatinine (mg/dL)   Date Value   01/24/2024 1.32 (H)   12/27/2023 1.50 (H)   11/01/2023 1.18 (H)     Lab Results   Component Value Date    CHOL 195 09/06/2023    CHOL 182 12/30/2021    CHOL 187 03/08/2021     Lab Results   Component Value Date    HDL 60.7 09/06/2023    HDL 45.4 12/30/2021    HDL 48.0 03/08/2021     Lab Results   Component Value Date    TRIG 228 (H) 09/06/2023    TRIG 155 (H) 12/30/2021    TRIG 148 03/08/2021     Lab Results   Component Value Date    TSH 2.22 09/06/2023       DEXA SCAN (10/2/23)  TECHNIQUE:  DEXA BONE DENSITY AXIAL SKELETON W VFA      FINDINGS:  SPINE L1-L4  Bone Mineral Density: 0.9 g/cm2  T-Score -1.3  Z-Score 0.8      LEFT FEMUR -TOTAL  Bone Mineral Density: 0.92 g/cm2  T-Score -0.2   Z-Score  1.4      LEFT FEMUR -NECK  Bone Mineral Density: 0.699 g/cm2  T-Score -1.4  Z-Score 0.5          World Health Organization (WHO) criteria for post-menopausal,   Women:  Normal:         T-score at or above -1 SD  Osteopenia:   T-score between -1 and -2.5 SD  Osteoporosis: T-score at or below -2.5 SD          10-year Fracture Risk(1):  Major Osteoporotic Fracture  15 %  Hip Fracture                        1.9 %      Note:  If no FRAX score is reported, it is because:  Some T-score for Spine Total or Hip Total or Femoral Neck at or below  -2.5      Vertebral Deformity Assessment: Exam Date - 10/2/2023 9:47 am  -----------------------------------------------------------------  Vertebral Level                      Impression  -----------------------------------------------------------------  T4                                        Not reported  T5                                        Not reported  T6                                        Not reported  T7                                         Not reported  T8                                        Normal  T9                                        Normal  T10                                      Normal  T11                                      Normal  T12                                      Mild Wedge Deformity  L1                                        Normal  L2                                        Moderate Superior Concave  Deformity L3                                        Normal  L4                                        Normal  -----------------------------------------------------------------  A spine fracture indicates 5X risk for subsequent spine fracture  and 2X risk for subsequent hip fracture.          IMPRESSION:  DEXA:  According to World Health Organization criteria,  classification is low bone mass (osteopenia)      Followup recommended in two years or sooner as clinically warranted.      VFA:  Moderate compression deformity involving the superior endplate  of the L2 vertebral body. Mild wedge deformity of the T12 vertebral  Body      IMPRESSION  OSTEOPOROSIS  History of atypical femur fracture, vertebral fractures of T12 and L2.  Patient does not know any details or dates of her fractures or osteoporosis treatment.   Reviewing her file, the atypical femur fracture may have occurred while using denosumab (Xgeva)  The L2 fracture is most recent, and it is unclear whether she was taking bisphosphonate at that time  Measured BMD is not terrible, but fracture history confirms osteoporosis and risk of further fracture.   As for secondary causes of osteoporosis:  No known steroid exposure, which needs to be confirmed  No evidence of hyperthyroidism  Mild vitamin D insufficiency  No suspicion of primary hyperparathyroidism  Low suspicion of paraprotein/myeloma.  She has regular oncology follow up.     PREDIABETES  History of impaired fasting glucose  No diabetic range glucose or A1c      RECOMMENDATIONS  Continue  exercise  Watch diet, decrease sugars and starches.   Option of dietitian referral    Draw labs in February    Osteoporosis medication option is limited to Forteo or Tymlos, which do not have the side effect of atypical femur fracture  Vitamin d sufficiency and parathyroid status need to be confirmed before considering medication.     Verify whether she is receiving any steroid with cancer treatments    Repeat A1c with Dr. Roa this summer

## 2024-01-29 NOTE — LETTER
"February 4, 2024     Rah Slater MD  [address]  [facility] [address]    Patient: Santy Brody   YOB: 1950   Date of Visit: 1/29/2024       Dear Dr. Rah Slater MD:    Thank you for referring Santy Brody to me for evaluation. Below are my notes for this consultation.  If you have questions, please do not hesitate to call me. I look forward to following your patient along with you.       Sincerely,     Eris Mcgill MD      CC: No Recipients  ______________________________________________________________________________________    History Of Present Illness  Santy Brody is a 73 y.o. female with diagnosis of prediabetes known for \"a long time\"    Patient does not know why she was referred and there is no referral on file.     No anti-hyperglycemic medication  No dietitian referral   Watching sugars and starches    No home glucose testing    Current therapy for breast cancer 1991  Returned metastatic to colon 2014  Metastatic to sacrum    Verzenio    History of lymphedema  History of edema    Last eye exam:  2023    At the conclusion of this evaluation, I learned she was actually here for osteoporosis.    Osteoporosis diagnosed shortly after breast cancer    L2 compression fracture (2022)  Right femur fracture on Fosamax and/or Xgeva  (2017)  She does not know the duration of Fosamax, possibly 5 years    Vitamin D 15 mcg in multivitamin  Calcium 1000 mg      Past Medical History  She has a past medical history of Abnormal finding of blood chemistry, unspecified (10/30/2018), Abnormal findings on diagnostic imaging of other specified body structures (01/10/2014), Acute upper respiratory infection, unspecified (05/21/2019), Bilateral foot pain, Body mass index (BMI) 29.0-29.9, adult (08/18/2022), Body mass index (BMI) 29.0-29.9, adult (02/17/2022), Body mass index (BMI)30.0-30.9, adult (09/14/2021), Encounter for immunization, Encounter for screening for malignant neoplasm of colon " (07/11/2022), Encounter for screening mammogram for malignant neoplasm of breast, antineoplastic chemo (1992), Localized edema (01/14/2020), Malignant neoplasm of colon, unspecified (CMS/HCC) (06/23/2015), Malignant neoplasm of unspecified site of unspecified female breast (CMS/HCC) (04/18/2021), Other conditions influencing health status, Personal history of diseases of the skin and subcutaneous tissue (06/23/2016), Personal history of diseases of the skin and subcutaneous tissue (04/23/2015), Personal history of irradiation (1992), Personal history of other diseases of the female genital tract, Personal history of other diseases of the nervous system and sense organs (09/14/2021), Personal history of other diseases of the respiratory system (02/24/2016), Personal history of other endocrine, nutritional and metabolic disease (12/17/2014), Personal history of other infectious and parasitic diseases (07/29/2015), Personal history of other malignant neoplasm of large intestine (02/17/2022), Personal history of other specified conditions (02/24/2016), Personal history of other specified conditions (01/02/2014), Personal history of other specified conditions (01/02/2014), Stress fracture, unspecified femur, initial encounter for fracture (08/24/2017), and Unspecified open wound, right lower leg, initial encounter (09/10/2019).    Surgical History  She has a past surgical history that includes Colonoscopy (06/26/2013); Mastectomy (07/31/2013); Other surgical history (07/31/2013); Other surgical history (07/31/2013); Other surgical history (07/31/2013); Colectomy (12/19/2016); and Other surgical history (02/19/2020).     Social History  She reports that she quit smoking about 40 years ago. Her smoking use included cigarettes. She smoked an average of .25 packs per day. She has never used smokeless tobacco. She reports that she does not currently use alcohol. She reports that she does not use drugs.    Family  History  Family History   Problem Relation Name Age of Onset   • Hyperlipidemia Mother     • Hypertension Mother     • Hypertension Sister     • Diabetes Other Maternal Aunt        Medications  Current Outpatient Medications   Medication Instructions   • abemaciclib (VERZENIO) 50 mg, oral, 2 times daily, Swallow whole.   • calcium carbonate-vitamin D3 600 mg-5 mcg (200 unit) tablet 1 tablet, oral, 2 times daily   • cranberry extract 200 mg capsule oral   • fenofibrate (TRICOR) 54 mg, oral, Daily before breakfast   • fluticasone (Flonase) 50 mcg/actuation nasal spray 2 sprays, Each Nostril, Daily   • furosemide (Lasix) 20 mg tablet TAKE ONE TABLET BY MOUTH EVERY DAY   • gabapentin (NEURONTIN) 100 mg, oral, Nightly   • lisinopril 10 mg, oral, Daily   • omega-3/dha/epa/fish oil (OMEGA-3 ORAL) oral, Omega 3 EPA + DHA 1000 MG Oral Capsule; Take 2 capsules by mouth twice daily.   • triamcinolone (Kenalog) 0.1 % cream 1 Film, Topical, 2 times daily   • TURMERIC ORAL No dose, route, or frequency recorded.       Allergies  Denosumab    Review of Systems   Constitutional:  Positive for unexpected weight change (gain).   HENT:  Positive for hearing loss and tinnitus.    Eyes:  Negative for visual disturbance.   Respiratory:  Negative for shortness of breath.    Endocrine:        As above   Genitourinary:  Negative for difficulty urinating and frequency.   Musculoskeletal:  Negative for arthralgias and back pain.   Skin:         Dry skin     Neurological:  Negative for headaches.        Neuropathy   Psychiatric/Behavioral:  Negative for dysphoric mood. The patient is not nervous/anxious.          Last Recorded Vitals  Blood pressure (!) 142/91, pulse 85, weight 73.9 kg (163 lb).    Physical Exam  Constitutional:       General: She is not in acute distress.  HENT:      Head: Normocephalic.      Mouth/Throat:      Mouth: Mucous membranes are moist.   Eyes:      Extraocular Movements: Extraocular movements intact.   Neck:       Thyroid: No thyromegaly.   Cardiovascular:      Pulses:           Radial pulses are 2+ on the right side and 2+ on the left side.   Musculoskeletal:      Right lower leg: No edema.      Left lower leg: No edema.      Comments: Normal spine curvature   Neurological:      Mental Status: She is alert.      Motor: No tremor.   Psychiatric:         Mood and Affect: Affect normal.         Cognition and Memory: Memory is impaired.          Relevant Results  Glucose (mg/dL)   Date Value   01/24/2024 116 (H)   12/27/2023 118 (H)   11/01/2023 117 (H)     Hemoglobin A1C (%)   Date Value   09/06/2023 5.6   12/30/2021 6.0 (A)   03/08/2021 5.9     Bicarbonate (mmol/L)   Date Value   01/24/2024 28   12/27/2023 28   11/01/2023 30     Urea Nitrogen (mg/dL)   Date Value   01/24/2024 20   12/27/2023 25 (H)   11/01/2023 24 (H)     Creatinine (mg/dL)   Date Value   01/24/2024 1.32 (H)   12/27/2023 1.50 (H)   11/01/2023 1.18 (H)     Lab Results   Component Value Date    CHOL 195 09/06/2023    CHOL 182 12/30/2021    CHOL 187 03/08/2021     Lab Results   Component Value Date    HDL 60.7 09/06/2023    HDL 45.4 12/30/2021    HDL 48.0 03/08/2021     Lab Results   Component Value Date    TRIG 228 (H) 09/06/2023    TRIG 155 (H) 12/30/2021    TRIG 148 03/08/2021     Lab Results   Component Value Date    TSH 2.22 09/06/2023       DEXA SCAN (10/2/23)  TECHNIQUE:  DEXA BONE DENSITY AXIAL SKELETON W VFA      FINDINGS:  SPINE L1-L4  Bone Mineral Density: 0.9 g/cm2  T-Score -1.3  Z-Score 0.8      LEFT FEMUR -TOTAL  Bone Mineral Density: 0.92 g/cm2  T-Score -0.2   Z-Score  1.4      LEFT FEMUR -NECK  Bone Mineral Density: 0.699 g/cm2  T-Score -1.4  Z-Score 0.5          World Health Organization (WHO) criteria for post-menopausal,   Women:  Normal:         T-score at or above -1 SD  Osteopenia:   T-score between -1 and -2.5 SD  Osteoporosis: T-score at or below -2.5 SD          10-year Fracture Risk(1):  Major Osteoporotic Fracture  15 %  Hip  Fracture                        1.9 %      Note:  If no FRAX score is reported, it is because:  Some T-score for Spine Total or Hip Total or Femoral Neck at or below  -2.5      Vertebral Deformity Assessment: Exam Date - 10/2/2023 9:47 am  -----------------------------------------------------------------  Vertebral Level                      Impression  -----------------------------------------------------------------  T4                                        Not reported  T5                                        Not reported  T6                                        Not reported  T7                                        Not reported  T8                                        Normal  T9                                        Normal  T10                                      Normal  T11                                      Normal  T12                                      Mild Wedge Deformity  L1                                        Normal  L2                                        Moderate Superior Concave  Deformity L3                                        Normal  L4                                        Normal  -----------------------------------------------------------------  A spine fracture indicates 5X risk for subsequent spine fracture  and 2X risk for subsequent hip fracture.          IMPRESSION:  DEXA:  According to World Health Organization criteria,  classification is low bone mass (osteopenia)      Followup recommended in two years or sooner as clinically warranted.      VFA:  Moderate compression deformity involving the superior endplate  of the L2 vertebral body. Mild wedge deformity of the T12 vertebral  Body      IMPRESSION  OSTEOPOROSIS  History of atypical femur fracture, vertebral fractures of T12 and L2.  Patient does not know any details or dates of her fractures or osteoporosis treatment.   Reviewing her file, the atypical femur fracture may have occurred while using denosumab  (Xgeva)  The L2 fracture is most recent, and it is unclear whether she was taking bisphosphonate at that time  Measured BMD is not terrible, but fracture history confirms osteoporosis and risk of further fracture.   As for secondary causes of osteoporosis:  No known steroid exposure, which needs to be confirmed  No evidence of hyperthyroidism  Mild vitamin D insufficiency  No suspicion of primary hyperparathyroidism  Low suspicion of paraprotein/myeloma.  She has regular oncology follow up.     PREDIABETES  History of impaired fasting glucose  No diabetic range glucose or A1c      RECOMMENDATIONS  Continue exercise  Watch diet, decrease sugars and starches.   Option of dietitian referral    Draw labs in February    Osteoporosis medication option is limited to Forteo or Tymlos, which do not have the side effect of atypical femur fracture  Vitamin d sufficiency and parathyroid status need to be confirmed before considering medication.     Verify whether she is receiving any steroid with cancer treatments    Repeat A1c with Dr. Roa this summer

## 2024-01-29 NOTE — PATIENT INSTRUCTIONS
RECOMMENDATIONS  Continue exercise  Watch diet, decrease sugars and starches.   Option of dietitian referral    Draw labs in February    Osteoporosis medication option is limited to Forteo or Tymlos, which do not have the side effect of atypical femur fracture    Repeat A1c with Dr. Roa this summer

## 2024-01-29 NOTE — TELEPHONE ENCOUNTER
Santy called nurse triage line and needed to change 3/24 infusion appointment. She also wanted to know if her Verzenio 50mg was ever called in to the pharmacy. I told her Dr. Slater put the Verzenio 50mg RX in to [facility] pharmacy on 12/29/23. She said she hasn't received the Verzenio and is going to call [facility] which I provided her the number to check on delivery status. She will call us back if needs anything else from us regarding the Verzenio after talking to [facility]. I transferred her to scheduling to change her infusion appointment in 3/24.

## 2024-01-30 ENCOUNTER — TELEPHONE (OUTPATIENT)
Dept: ADMISSION | Facility: HOSPITAL | Age: 74
End: 2024-01-30
Payer: COMMERCIAL

## 2024-01-30 NOTE — TELEPHONE ENCOUNTER
[facility] called to inform that Verzenio 50mg requires prior authorization.  Pt indicated to them that she needs the medication soon and requested an expedited prior auth.  Phone number to initiate PA is [phone].

## 2024-01-31 DIAGNOSIS — C50.919 MALIGNANT NEOPLASM OF BREAST IN FEMALE, ESTROGEN RECEPTOR POSITIVE, UNSPECIFIED LATERALITY, UNSPECIFIED SITE OF BREAST (MULTI): ICD-10-CM

## 2024-01-31 DIAGNOSIS — Z17.0 MALIGNANT NEOPLASM OF BREAST IN FEMALE, ESTROGEN RECEPTOR POSITIVE, UNSPECIFIED LATERALITY, UNSPECIFIED SITE OF BREAST (MULTI): ICD-10-CM

## 2024-02-01 ENCOUNTER — SPECIALTY PHARMACY (OUTPATIENT)
Dept: PHARMACY | Facility: CLINIC | Age: 74
End: 2024-02-01

## 2024-02-04 DIAGNOSIS — M81.0 OSTEOPOROSIS, UNSPECIFIED OSTEOPOROSIS TYPE, UNSPECIFIED PATHOLOGICAL FRACTURE PRESENCE: Primary | ICD-10-CM

## 2024-02-04 ASSESSMENT — ENCOUNTER SYMPTOMS
UNEXPECTED WEIGHT CHANGE: 1
ROS SKIN COMMENTS: DRY SKIN
ENDOCRINE COMMENTS: AS ABOVE
HEADACHES: 0
BACK PAIN: 0
NERVOUS/ANXIOUS: 0
ARTHRALGIAS: 0
DIFFICULTY URINATING: 0
DYSPHORIC MOOD: 0
FREQUENCY: 0
SHORTNESS OF BREATH: 0

## 2024-02-06 ENCOUNTER — TELEPHONE (OUTPATIENT)
Dept: ADMISSION | Facility: HOSPITAL | Age: 74
End: 2024-02-06
Payer: COMMERCIAL

## 2024-02-06 DIAGNOSIS — C50.919 MALIGNANT NEOPLASM OF BREAST IN FEMALE, ESTROGEN RECEPTOR POSITIVE, UNSPECIFIED LATERALITY, UNSPECIFIED SITE OF BREAST (MULTI): ICD-10-CM

## 2024-02-06 DIAGNOSIS — Z17.0 MALIGNANT NEOPLASM OF BREAST IN FEMALE, ESTROGEN RECEPTOR POSITIVE, UNSPECIFIED LATERALITY, UNSPECIFIED SITE OF BREAST (MULTI): ICD-10-CM

## 2024-02-06 NOTE — TELEPHONE ENCOUNTER
Pt called because her insurance has approved reduced dose of verzenio 50mg and states that [facility] needs a prescription to proceed.  Pt advised that medication was submitted to [facility] earlier today.

## 2024-02-09 DIAGNOSIS — E78.5 HYPERLIPIDEMIA, UNSPECIFIED HYPERLIPIDEMIA TYPE: ICD-10-CM

## 2024-02-09 RX ORDER — FENOFIBRATE 54 MG/1
54 TABLET ORAL
Qty: 90 TABLET | Refills: 1 | Status: SHIPPED | OUTPATIENT
Start: 2024-02-09

## 2024-02-21 ENCOUNTER — INFUSION (OUTPATIENT)
Dept: HEMATOLOGY/ONCOLOGY | Facility: CLINIC | Age: 74
End: 2024-02-21
Payer: COMMERCIAL

## 2024-02-21 ENCOUNTER — LAB (OUTPATIENT)
Dept: LAB | Facility: CLINIC | Age: 74
End: 2024-02-21
Payer: COMMERCIAL

## 2024-02-21 VITALS
RESPIRATION RATE: 18 BRPM | TEMPERATURE: 97 F | HEART RATE: 81 BPM | WEIGHT: 160.72 LBS | BODY MASS INDEX: 30.94 KG/M2 | DIASTOLIC BLOOD PRESSURE: 89 MMHG | OXYGEN SATURATION: 96 % | SYSTOLIC BLOOD PRESSURE: 150 MMHG

## 2024-02-21 DIAGNOSIS — C50.919 MALIGNANT NEOPLASM OF FEMALE BREAST, UNSPECIFIED ESTROGEN RECEPTOR STATUS, UNSPECIFIED LATERALITY, UNSPECIFIED SITE OF BREAST: ICD-10-CM

## 2024-02-21 DIAGNOSIS — C79.51 CARCINOMA OF BREAST METASTATIC TO BONE, UNSPECIFIED LATERALITY (MULTI): ICD-10-CM

## 2024-02-21 DIAGNOSIS — C50.919 CARCINOMA OF BREAST METASTATIC TO BONE, UNSPECIFIED LATERALITY (MULTI): ICD-10-CM

## 2024-02-21 DIAGNOSIS — C78.00 CARCINOMA OF BREAST METASTATIC TO LUNG, UNSPECIFIED LATERALITY (MULTI): ICD-10-CM

## 2024-02-21 DIAGNOSIS — C50.919 MALIGNANT NEOPLASM OF FEMALE BREAST, UNSPECIFIED ESTROGEN RECEPTOR STATUS, UNSPECIFIED LATERALITY, UNSPECIFIED SITE OF BREAST (MULTI): ICD-10-CM

## 2024-02-21 DIAGNOSIS — C50.919 CARCINOMA OF BREAST METASTATIC TO LUNG, UNSPECIFIED LATERALITY (MULTI): ICD-10-CM

## 2024-02-21 LAB
ALBUMIN SERPL BCP-MCNC: 4.1 G/DL (ref 3.4–5)
ALP SERPL-CCNC: 40 U/L (ref 33–136)
ALT SERPL W P-5'-P-CCNC: 20 U/L (ref 7–45)
ANION GAP SERPL CALC-SCNC: 13 MMOL/L (ref 10–20)
AST SERPL W P-5'-P-CCNC: 33 U/L (ref 9–39)
BASOPHILS # BLD AUTO: 0.03 X10*3/UL (ref 0–0.1)
BASOPHILS NFR BLD AUTO: 0.9 %
BILIRUB SERPL-MCNC: 0.8 MG/DL (ref 0–1.2)
BUN SERPL-MCNC: 16 MG/DL (ref 6–23)
CALCIUM SERPL-MCNC: 9.4 MG/DL (ref 8.6–10.3)
CHLORIDE SERPL-SCNC: 94 MMOL/L (ref 98–107)
CO2 SERPL-SCNC: 28 MMOL/L (ref 21–32)
CREAT SERPL-MCNC: 1.09 MG/DL (ref 0.5–1.05)
EGFRCR SERPLBLD CKD-EPI 2021: 54 ML/MIN/1.73M*2
EOSINOPHIL # BLD AUTO: 0.06 X10*3/UL (ref 0–0.4)
EOSINOPHIL NFR BLD AUTO: 1.8 %
ERYTHROCYTE [DISTWIDTH] IN BLOOD BY AUTOMATED COUNT: 15.2 % (ref 11.5–14.5)
GLUCOSE SERPL-MCNC: 156 MG/DL (ref 74–99)
HCT VFR BLD AUTO: 26.8 % (ref 36–46)
HGB BLD-MCNC: 8.4 G/DL (ref 12–16)
IMM GRANULOCYTES # BLD AUTO: 0.1 X10*3/UL (ref 0–0.5)
IMM GRANULOCYTES NFR BLD AUTO: 2.9 % (ref 0–0.9)
LYMPHOCYTES # BLD AUTO: 1.21 X10*3/UL (ref 0.8–3)
LYMPHOCYTES NFR BLD AUTO: 35.4 %
MCH RBC QN AUTO: 22.6 PG (ref 26–34)
MCHC RBC AUTO-ENTMCNC: 31.3 G/DL (ref 32–36)
MCV RBC AUTO: 72 FL (ref 80–100)
MONOCYTES # BLD AUTO: 0.57 X10*3/UL (ref 0.05–0.8)
MONOCYTES NFR BLD AUTO: 16.7 %
NEUTROPHILS # BLD AUTO: 1.45 X10*3/UL (ref 1.6–5.5)
NEUTROPHILS NFR BLD AUTO: 42.3 %
NRBC BLD-RTO: 1.5 /100 WBCS (ref 0–0)
PLATELET # BLD AUTO: 209 X10*3/UL (ref 150–450)
POTASSIUM SERPL-SCNC: 4 MMOL/L (ref 3.5–5.3)
PROT SERPL-MCNC: 7.6 G/DL (ref 6.4–8.2)
RBC # BLD AUTO: 3.71 X10*6/UL (ref 4–5.2)
SODIUM SERPL-SCNC: 131 MMOL/L (ref 136–145)
WBC # BLD AUTO: 3.4 X10*3/UL (ref 4.4–11.3)

## 2024-02-21 PROCEDURE — 96372 THER/PROPH/DIAG INJ SC/IM: CPT | Mod: 59 | Performed by: INTERNAL MEDICINE

## 2024-02-21 PROCEDURE — 80053 COMPREHEN METABOLIC PANEL: CPT

## 2024-02-21 PROCEDURE — 36415 COLL VENOUS BLD VENIPUNCTURE: CPT

## 2024-02-21 PROCEDURE — 96402 CHEMO HORMON ANTINEOPL SQ/IM: CPT

## 2024-02-21 PROCEDURE — 2500000004 HC RX 250 GENERAL PHARMACY W/ HCPCS (ALT 636 FOR OP/ED): Mod: JZ,JG | Performed by: INTERNAL MEDICINE

## 2024-02-21 PROCEDURE — 85025 COMPLETE CBC W/AUTO DIFF WBC: CPT

## 2024-02-21 RX ORDER — ALBUTEROL SULFATE 0.83 MG/ML
3 SOLUTION RESPIRATORY (INHALATION) AS NEEDED
Status: DISCONTINUED | OUTPATIENT
Start: 2024-02-21 | End: 2024-02-21 | Stop reason: HOSPADM

## 2024-02-21 RX ORDER — EPINEPHRINE 0.3 MG/.3ML
0.3 INJECTION SUBCUTANEOUS EVERY 5 MIN PRN
Status: DISCONTINUED | OUTPATIENT
Start: 2024-02-21 | End: 2024-02-21 | Stop reason: HOSPADM

## 2024-02-21 RX ORDER — FAMOTIDINE 10 MG/ML
20 INJECTION INTRAVENOUS ONCE AS NEEDED
Status: DISCONTINUED | OUTPATIENT
Start: 2024-02-21 | End: 2024-02-21 | Stop reason: HOSPADM

## 2024-02-21 RX ORDER — DIPHENHYDRAMINE HYDROCHLORIDE 50 MG/ML
50 INJECTION INTRAMUSCULAR; INTRAVENOUS AS NEEDED
Status: DISCONTINUED | OUTPATIENT
Start: 2024-02-21 | End: 2024-02-21 | Stop reason: HOSPADM

## 2024-02-21 RX ORDER — LAMOTRIGINE 25 MG/1
500 TABLET ORAL ONCE
Status: COMPLETED | OUTPATIENT
Start: 2024-02-21 | End: 2024-02-21

## 2024-02-21 RX ADMIN — FULVESTRANT 500 MG: 50 INJECTION, SOLUTION INTRAMUSCULAR at 12:47

## 2024-02-21 ASSESSMENT — PAIN SCALES - GENERAL: PAINLEVEL: 0-NO PAIN

## 2024-02-27 ENCOUNTER — APPOINTMENT (OUTPATIENT)
Dept: HEMATOLOGY/ONCOLOGY | Facility: CLINIC | Age: 74
End: 2024-02-27
Payer: COMMERCIAL

## 2024-02-29 DIAGNOSIS — I10 BENIGN ESSENTIAL HYPERTENSION: ICD-10-CM

## 2024-02-29 RX ORDER — LISINOPRIL 10 MG/1
10 TABLET ORAL DAILY
Qty: 90 TABLET | Refills: 1 | Status: SHIPPED | OUTPATIENT
Start: 2024-02-29

## 2024-03-06 ENCOUNTER — HOSPITAL ENCOUNTER (OUTPATIENT)
Dept: RADIOLOGY | Facility: CLINIC | Age: 74
Discharge: HOME | End: 2024-03-06
Payer: COMMERCIAL

## 2024-03-06 DIAGNOSIS — C78.00 CARCINOMA OF BREAST METASTATIC TO LUNG, UNSPECIFIED LATERALITY (MULTI): ICD-10-CM

## 2024-03-06 DIAGNOSIS — C50.919 CARCINOMA OF BREAST METASTATIC TO LUNG, UNSPECIFIED LATERALITY (MULTI): ICD-10-CM

## 2024-03-06 DIAGNOSIS — C50.919 CARCINOMA OF BREAST METASTATIC TO BONE, UNSPECIFIED LATERALITY (MULTI): ICD-10-CM

## 2024-03-06 DIAGNOSIS — C79.51 CARCINOMA OF BREAST METASTATIC TO BONE, UNSPECIFIED LATERALITY (MULTI): ICD-10-CM

## 2024-03-07 ENCOUNTER — HOSPITAL ENCOUNTER (OUTPATIENT)
Dept: RADIOLOGY | Facility: CLINIC | Age: 74
Discharge: HOME | End: 2024-03-07
Payer: COMMERCIAL

## 2024-03-07 LAB — GLUCOSE BLD MANUAL STRIP-MCNC: 104 MG/DL (ref 74–99)

## 2024-03-07 PROCEDURE — 82947 ASSAY GLUCOSE BLOOD QUANT: CPT

## 2024-03-07 PROCEDURE — 74177 CT ABD & PELVIS W/CONTRAST: CPT

## 2024-03-07 PROCEDURE — A9552 F18 FDG: HCPCS

## 2024-03-07 PROCEDURE — 78815 PET IMAGE W/CT SKULL-THIGH: CPT | Performed by: NUCLEAR MEDICINE

## 2024-03-07 PROCEDURE — 2550000001 HC RX 255 CONTRASTS

## 2024-03-07 PROCEDURE — 78815 PET IMAGE W/CT SKULL-THIGH: CPT

## 2024-03-07 PROCEDURE — 3430000001 HC RX 343 DIAGNOSTIC RADIOPHARMACEUTICALS

## 2024-03-07 RX ORDER — FLUDEOXYGLUCOSE F 18 200 MCI/ML
11.6 INJECTION, SOLUTION INTRAVENOUS
Status: COMPLETED | OUTPATIENT
Start: 2024-03-07 | End: 2024-03-07

## 2024-03-07 RX ADMIN — IOHEXOL 75 ML: 350 INJECTION, SOLUTION INTRAVENOUS at 08:45

## 2024-03-07 RX ADMIN — FLUDEOXYGLUCOSE F 18 11.6 MILLICURIE: 200 INJECTION, SOLUTION INTRAVENOUS at 08:26

## 2024-03-12 DIAGNOSIS — C50.919 MALIGNANT NEOPLASM OF FEMALE BREAST, UNSPECIFIED ESTROGEN RECEPTOR STATUS, UNSPECIFIED LATERALITY, UNSPECIFIED SITE OF BREAST (MULTI): Primary | ICD-10-CM

## 2024-03-12 RX ORDER — DIPHENHYDRAMINE HYDROCHLORIDE 50 MG/ML
50 INJECTION INTRAMUSCULAR; INTRAVENOUS AS NEEDED
Status: CANCELLED | OUTPATIENT
Start: 2024-03-20

## 2024-03-12 RX ORDER — FAMOTIDINE 10 MG/ML
20 INJECTION INTRAVENOUS ONCE AS NEEDED
Status: CANCELLED | OUTPATIENT
Start: 2024-03-20

## 2024-03-12 RX ORDER — ALBUTEROL SULFATE 0.83 MG/ML
3 SOLUTION RESPIRATORY (INHALATION) AS NEEDED
Status: CANCELLED | OUTPATIENT
Start: 2024-03-20

## 2024-03-12 RX ORDER — EPINEPHRINE 0.3 MG/.3ML
0.3 INJECTION SUBCUTANEOUS EVERY 5 MIN PRN
Status: CANCELLED | OUTPATIENT
Start: 2024-03-20

## 2024-03-12 RX ORDER — LAMOTRIGINE 25 MG/1
500 TABLET ORAL ONCE
Status: CANCELLED | OUTPATIENT
Start: 2024-03-20

## 2024-03-13 ENCOUNTER — APPOINTMENT (OUTPATIENT)
Dept: RADIOLOGY | Facility: HOSPITAL | Age: 74
End: 2024-03-13
Payer: COMMERCIAL

## 2024-03-13 ENCOUNTER — APPOINTMENT (OUTPATIENT)
Dept: HEMATOLOGY/ONCOLOGY | Facility: CLINIC | Age: 74
End: 2024-03-13
Payer: COMMERCIAL

## 2024-03-13 ENCOUNTER — APPOINTMENT (OUTPATIENT)
Dept: LAB | Facility: CLINIC | Age: 74
End: 2024-03-13
Payer: COMMERCIAL

## 2024-03-13 ENCOUNTER — OFFICE VISIT (OUTPATIENT)
Dept: HEMATOLOGY/ONCOLOGY | Facility: CLINIC | Age: 74
End: 2024-03-13
Payer: COMMERCIAL

## 2024-03-13 VITALS
RESPIRATION RATE: 18 BRPM | TEMPERATURE: 96.4 F | HEART RATE: 106 BPM | DIASTOLIC BLOOD PRESSURE: 90 MMHG | OXYGEN SATURATION: 97 % | WEIGHT: 162.7 LBS | BODY MASS INDEX: 31.32 KG/M2 | SYSTOLIC BLOOD PRESSURE: 145 MMHG

## 2024-03-13 DIAGNOSIS — Z79.818 USE OF FULVESTRANT (FASLODEX): ICD-10-CM

## 2024-03-13 DIAGNOSIS — C50.919 MALIGNANT NEOPLASM OF FEMALE BREAST, UNSPECIFIED ESTROGEN RECEPTOR STATUS, UNSPECIFIED LATERALITY, UNSPECIFIED SITE OF BREAST (MULTI): Primary | ICD-10-CM

## 2024-03-13 DIAGNOSIS — C18.7 CANCER OF SIGMOID COLON (MULTI): ICD-10-CM

## 2024-03-13 DIAGNOSIS — C79.51 CARCINOMA OF BREAST METASTATIC TO BONE, UNSPECIFIED LATERALITY (MULTI): ICD-10-CM

## 2024-03-13 DIAGNOSIS — C50.919 CARCINOMA OF BREAST METASTATIC TO BONE, UNSPECIFIED LATERALITY (MULTI): ICD-10-CM

## 2024-03-13 DIAGNOSIS — D64.9 ANEMIA, UNSPECIFIED TYPE: ICD-10-CM

## 2024-03-13 DIAGNOSIS — Z51.81 ENCOUNTER FOR THERAPEUTIC DRUG MONITORING: ICD-10-CM

## 2024-03-13 DIAGNOSIS — C50.919 CARCINOMA OF BREAST METASTATIC TO LUNG, UNSPECIFIED LATERALITY (MULTI): ICD-10-CM

## 2024-03-13 DIAGNOSIS — C78.00 CARCINOMA OF BREAST METASTATIC TO LUNG, UNSPECIFIED LATERALITY (MULTI): ICD-10-CM

## 2024-03-13 PROCEDURE — 99215 OFFICE O/P EST HI 40 MIN: CPT | Performed by: INTERNAL MEDICINE

## 2024-03-13 PROCEDURE — 3008F BODY MASS INDEX DOCD: CPT | Performed by: INTERNAL MEDICINE

## 2024-03-13 PROCEDURE — 1159F MED LIST DOCD IN RCRD: CPT | Performed by: INTERNAL MEDICINE

## 2024-03-13 PROCEDURE — 1126F AMNT PAIN NOTED NONE PRSNT: CPT | Performed by: INTERNAL MEDICINE

## 2024-03-13 PROCEDURE — 3080F DIAST BP >= 90 MM HG: CPT | Performed by: INTERNAL MEDICINE

## 2024-03-13 PROCEDURE — 3077F SYST BP >= 140 MM HG: CPT | Performed by: INTERNAL MEDICINE

## 2024-03-13 PROCEDURE — 1036F TOBACCO NON-USER: CPT | Performed by: INTERNAL MEDICINE

## 2024-03-13 RX ORDER — EPINEPHRINE 0.3 MG/.3ML
0.3 INJECTION SUBCUTANEOUS EVERY 5 MIN PRN
Status: CANCELLED | OUTPATIENT
Start: 2024-05-15

## 2024-03-13 RX ORDER — FAMOTIDINE 10 MG/ML
20 INJECTION INTRAVENOUS ONCE AS NEEDED
OUTPATIENT
Start: 2024-06-12

## 2024-03-13 RX ORDER — FAMOTIDINE 10 MG/ML
20 INJECTION INTRAVENOUS ONCE AS NEEDED
Status: CANCELLED | OUTPATIENT
Start: 2024-05-15

## 2024-03-13 RX ORDER — ALBUTEROL SULFATE 0.83 MG/ML
3 SOLUTION RESPIRATORY (INHALATION) AS NEEDED
OUTPATIENT
Start: 2024-06-12

## 2024-03-13 RX ORDER — EPINEPHRINE 0.3 MG/.3ML
0.3 INJECTION SUBCUTANEOUS EVERY 5 MIN PRN
OUTPATIENT
Start: 2024-06-12

## 2024-03-13 RX ORDER — FAMOTIDINE 10 MG/ML
20 INJECTION INTRAVENOUS ONCE AS NEEDED
Status: CANCELLED | OUTPATIENT
Start: 2024-04-17

## 2024-03-13 RX ORDER — DIPHENHYDRAMINE HYDROCHLORIDE 50 MG/ML
50 INJECTION INTRAMUSCULAR; INTRAVENOUS AS NEEDED
Status: CANCELLED | OUTPATIENT
Start: 2024-04-17

## 2024-03-13 RX ORDER — EPINEPHRINE 0.3 MG/.3ML
0.3 INJECTION SUBCUTANEOUS EVERY 5 MIN PRN
Status: CANCELLED | OUTPATIENT
Start: 2024-04-17

## 2024-03-13 RX ORDER — ALBUTEROL SULFATE 0.83 MG/ML
3 SOLUTION RESPIRATORY (INHALATION) AS NEEDED
Status: CANCELLED | OUTPATIENT
Start: 2024-05-15

## 2024-03-13 RX ORDER — LAMOTRIGINE 25 MG/1
500 TABLET ORAL ONCE
OUTPATIENT
Start: 2024-06-12

## 2024-03-13 RX ORDER — LAMOTRIGINE 25 MG/1
500 TABLET ORAL ONCE
Status: CANCELLED | OUTPATIENT
Start: 2024-05-15

## 2024-03-13 RX ORDER — DIPHENHYDRAMINE HYDROCHLORIDE 50 MG/ML
50 INJECTION INTRAMUSCULAR; INTRAVENOUS AS NEEDED
Status: CANCELLED | OUTPATIENT
Start: 2024-05-15

## 2024-03-13 RX ORDER — DIPHENHYDRAMINE HYDROCHLORIDE 50 MG/ML
50 INJECTION INTRAMUSCULAR; INTRAVENOUS AS NEEDED
OUTPATIENT
Start: 2024-06-12

## 2024-03-13 RX ORDER — LAMOTRIGINE 25 MG/1
500 TABLET ORAL ONCE
Status: CANCELLED | OUTPATIENT
Start: 2024-04-17

## 2024-03-13 RX ORDER — ALBUTEROL SULFATE 0.83 MG/ML
3 SOLUTION RESPIRATORY (INHALATION) AS NEEDED
Status: CANCELLED | OUTPATIENT
Start: 2024-04-17

## 2024-03-13 ASSESSMENT — PAIN SCALES - GENERAL: PAINLEVEL: 0-NO PAIN

## 2024-03-13 NOTE — PROGRESS NOTES
Pt seen in office today for a follow up visit with Dr. Slater  for management of her breast cancer.  She is  without complaints today and denies pain.     Medications, pharmacy preference and allergies were reviewed with patient and updated in the medical record.     Per orders, she has previously scheduled treatments, labs and FUV with [facility] Myles.    Our contact information was given to patient and they were encouraged to contact us with any questions or concerns.     Patient verbalized understanding and agreement regarding discussed information via verbal feedback. Pt escorted to scheduling.

## 2024-03-18 DIAGNOSIS — M79.18 MUSCULOSKELETAL PAIN: ICD-10-CM

## 2024-03-18 RX ORDER — GABAPENTIN 100 MG/1
100 CAPSULE ORAL NIGHTLY
Qty: 90 CAPSULE | Refills: 1 | Status: SHIPPED | OUTPATIENT
Start: 2024-03-18

## 2024-03-19 NOTE — PROGRESS NOTES
"Patient ID: Santy Brody is a 73 y.o. female.  The patient presents to clinic today for her history of metastatic ER+/Her2- breast cancer.     Cancer Staging   Malignant neoplasm of female breast (CMS/HCC)  Staging form: Breast, AJCC 8th Edition  - Pathologic: Stage IV (cM1) - Unsigned    Diagnostic/Therapeutic History:  - She presented with breast cancer presented in 1991 with a right-sided breast lesion. She had a local recurrence in 1998 in the axilla measuring 3 cm.  Her-2/smiht was positive as was hormone receptors. It was never clear if this was breast tissue or seamus tissue is her recurrence. Biopsies showed metastatic disease from the left iliac bone on February 12, 2013. It was consistent with breast carcinoma  with estrogen and progesterone receptors positive and HER-2/smith negative. It also appears that PITER-3 is positive as well.   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 A T-cell gene receptor arrangement study did suggest a clone, and a bone marrow biopsy was negative. Her staging studies were otherwise negative.   In the context of evaluating for metastatic disease, she underwent a PET scan where 2 areas in the colon. She has since undergone a colonoscopy, which was abnormal in 2 separate areas. Biopsy proven disease now exist in the proximal ascending colon with  a moderately differentiated adenocarcinoma as well as the sigmoid. KRAS assessment on the tumor has not been done. She has now had a definitive resection.  ERBB2 G, Missense\" class=\"variant-details ellipsis\" _[id number]-[id number]=\"\"L869R, 2606T>G, Missense   PIK3CA A, Missense\" class=\"variant-details ellipsis\" _[id number]-jnk-[id number]=\"\"E545K, 1633G>A, Missense   DNMT3A K468fs*1, 1402_1415delAAGGAGATTATTGA, Frameshift   TET2 O8051uj*19, 3353delA, Frameshift.    Treatment History:    1991-0-0: Cancer Related Surgery: Right mastectomy and axillary node evaluation in 1991 1991-0-0: " Disease Assessment- Breast: Initial right-sided breast cancer diagnosed in 1991, the pathology of which, I have not been able to obtain. In 1998 she had  right axillary recurrence with 3 cm worth of tumor noted.  It was unclear if margins were  obtained or if was axiallry only tissue  1991-0-0: Chemotherapy: Six cycles of Adriamycin based chemotherapy as adjuvant      treatment in 1991.     1998-0-0: Radiation Therapy: Right axillary versus right upper quadrant base re-occurrence,      status post resection and right chest wall radiation in 1998.     1998-0-0: Cancer Related Surgery: 1998 Right axillary recurrence-3cm  2002-0-0: Hormonal Therapy: Tamoxifen was taken from 1998 to 2002 2002-7-1: New Treatment Plan: Anastrazole 2002-2008 2006-0-0: Radiation Therapy: Localized  XRT to RUE after resection of Tcell NHL  2006-12-0: Cancer Related Surgery: RUE subcutaneous mass c/w peripheral T cell NHL  2014-2-7: Disease Assessment-colon: AFter PET+ findings a colonoscopy revealed dz in 2 separate areas.  Biopsy proven  disease now exist in the proximal ascending colon with a moderately  differentiated adenocarcinoma as well as the sigmoid.  KRAS   has not been done.     2014-2-12: First Relapse Date-systemic: left iliac bone on February 12, 2013.  It was  consistent with breast carcinoma with estrogen and progesterone  receptors positive and HER-2/smith negative.  2014-3-20: Chemotherapy: Capecitabine 50 mg b.i.d. status post 2 cycles, her last March 20, 2014  2014-3-28: New Treatment Plan: Abraxane 260 mg/m2  2014-7-28: New Follow-up Plan: Abraxane stopped.  Exemestane initiated  2014-9-4: Cancer Related Surgery: R hemicolectomy  2016-2-19: New Treatment Plan: Exemestane stopped/ letrozole initiated  2016-3-4: Miscellaneous: Palbociclib initiated  2022-1-5: New Treatment Plan: Letrozole/Ibrance stopped with new bone disease noted  2022-1-20: Chemotherapy: fulvestrant start  2022-2-3: Chemotherapy: Abemiciclib  start      History of Present Illness (HPI)/Interval History:  Ms. Brody presents today for routine FUV.  She has been dealing with nasal congestion for the past few days.  Denies sinus pain, fever/chills, sore throat.  Recently start 50 mg BID dose of Verzenio.  Prior back pain has improved. Avoiding heavy lifting, as that seems to aggravate pain.  No dyspnea, abdominal pain/cramping.    Review of Systems:  14-point ROS otherwise negative, as per HPI.    Past Medical History:   Diagnosis Date    Abnormal finding of blood chemistry, unspecified 10/30/2018    Abnormal blood chemistry    Abnormal findings on diagnostic imaging of other specified body structures 01/10/2014    Abnormal finding on imaging    Acute upper respiratory infection, unspecified 05/21/2019    Upper respiratory infection, viral    Bilateral foot pain     Body mass index (BMI) 29.0-29.9, adult 08/18/2022    BMI 29.0-29.9,adult    Body mass index (BMI) 29.0-29.9, adult 02/17/2022    BMI 29.0-29.9,adult    Body mass index (BMI)30.0-30.9, adult 09/14/2021    BMI 30.0-30.9,adult    Encounter for immunization     COVID-19 vaccine administered    Encounter for screening for malignant neoplasm of colon 07/11/2022    Screen for colon cancer    Encounter for screening mammogram for malignant neoplasm of breast     Visit for screening mammogram    Hx antineoplastic chemo 1992    Localized edema 01/14/2020    Pedal edema    Malignant neoplasm of colon, unspecified (CMS/HCC) 06/23/2015    Adenocarcinoma of colon    Malignant neoplasm of unspecified site of unspecified female breast (CMS/HCC) 04/18/2021    Adenocarcinoma of breast    Other conditions influencing health status     DEXA Body Composition Study    Personal history of diseases of the skin and subcutaneous tissue 06/23/2016    History of acne    Personal history of diseases of the skin and subcutaneous tissue 04/23/2015    History of impetigo    Personal history of irradiation 1992    Personal  history of other diseases of the female genital tract     History of endometriosis    Personal history of other diseases of the nervous system and sense organs 09/14/2021    History of restless legs syndrome    Personal history of other diseases of the respiratory system 02/24/2016    History of paranasal sinus congestion    Personal history of other endocrine, nutritional and metabolic disease 12/17/2014    History of obesity    Personal history of other infectious and parasitic diseases 07/29/2015    History of herpes zoster    Personal history of other malignant neoplasm of large intestine 02/17/2022    History of other malignant neoplasm of large intestine    Personal history of other specified conditions 02/24/2016    History of postnasal drip    Personal history of other specified conditions 01/02/2014    History of fatigue    Personal history of other specified conditions 01/02/2014    History of shortness of breath    Stress fracture, unspecified femur, initial encounter for fracture 08/24/2017    Stress fracture of femoral shaft    Unspecified open wound, right lower leg, initial encounter 09/10/2019    Leg wound, right       Past Surgical History:   Procedure Laterality Date    COLECTOMY  12/19/2016    Partial Colectomy    COLONOSCOPY  06/26/2013    Complete Colonoscopy    MASTECTOMY  07/31/2013    Breast Surgery Mastectomy    OTHER SURGICAL HISTORY  07/31/2013    Oophorectomy - Bilateral (Removal Of Both Ovaries)    OTHER SURGICAL HISTORY  07/31/2013    Arm Excision Of Soft Tissue Tumor    OTHER SURGICAL HISTORY  07/31/2013    Breast Surgery Reconstruction    OTHER SURGICAL HISTORY  02/19/2020    Laparoscopic endometrioma fulguration       Social History     Socioeconomic History    Marital status:      Spouse name: None    Number of children: None    Years of education: None    Highest education level: None   Occupational History    None   Tobacco Use    Smoking status: Former     Packs/day: .25      Types: Cigarettes     Quit date:      Years since quittin.2    Smokeless tobacco: Never   Vaping Use    Vaping Use: Never used   Substance and Sexual Activity    Alcohol use: Not Currently     Comment: socially    Drug use: Never    Sexual activity: Defer   Other Topics Concern    None   Social History Narrative    None     Social Determinants of Health     Financial Resource Strain: Not on file   Food Insecurity: Not on file   Transportation Needs: Not on file   Physical Activity: Not on file   Stress: Not on file   Social Connections: Not on file   Intimate Partner Violence: Not on file   Housing Stability: Not on file       Allergies   Allergen Reactions    Denosumab Unknown and Other     Xgeva: Adverse Reaction: Osteoporosis with fracture    Should avoid all Biphosphonates         Current Outpatient Medications:     abemaciclib (Verzenio) 50 mg tablet, Take 1 tablet (50 mg total) by mouth 2 times a day.  Swallow whole., Disp: 56 tablet, Rfl: 2    calcium carbonate-vitamin D3 600 mg-5 mcg (200 unit) tablet, Take 1 tablet by mouth 2 times a day., Disp: , Rfl:     cranberry extract 200 mg capsule, Take by mouth., Disp: , Rfl:     fenofibrate (Tricor) 54 mg tablet, Take 1 tablet (54 mg) by mouth once daily in the morning. Take before meals., Disp: 90 tablet, Rfl: 1    fluticasone (Flonase) 50 mcg/actuation nasal spray, Administer 2 sprays into each nostril once daily., Disp: , Rfl:     furosemide (Lasix) 20 mg tablet, TAKE ONE TABLET BY MOUTH EVERY DAY, Disp: 90 tablet, Rfl: 0    lisinopril 10 mg tablet, Take 1 tablet (10 mg) by mouth once daily., Disp: 90 tablet, Rfl: 1    omega-3/dha/epa/fish oil (OMEGA-3 ORAL), Take by mouth. Omega 3 EPA + DHA 1000 MG Oral Capsule; Take 2 capsules by mouth twice daily., Disp: , Rfl:     triamcinolone (Kenalog) 0.1 % cream, Apply 1 Film topically in the morning and 1 Film before bedtime., Disp: , Rfl:     TURMERIC ORAL, , Disp: , Rfl:     gabapentin (Neurontin) 100 mg  capsule, Take 1 capsule (100 mg) by mouth once daily at bedtime., Disp: 90 capsule, Rfl: 1     Objective    BSA: 1.77 meters squared  /90 (BP Location: Left arm, Patient Position: Sitting, BP Cuff Size: Adult long)   Pulse 106   Temp 35.8 °C (96.4 °F) (Temporal)   Resp 18   Wt 73.8 kg (162 lb 11.2 oz)   SpO2 97%   BMI 31.32 kg/m²     Performance Status:  The ECOG performance scale today is ECO- Restricted in physically strenuous activity.  Carries out light duty.    Physical Exam  Constitutional:       General: She is not in acute distress.     Appearance: Normal appearance. She is not ill-appearing, toxic-appearing or diaphoretic.   HENT:      Head: Normocephalic and atraumatic.   Eyes:      General: No scleral icterus.     Conjunctiva/sclera: Conjunctivae normal.   Cardiovascular:      Rate and Rhythm: Normal rate and regular rhythm.      Heart sounds: No murmur heard.  Pulmonary:      Effort: Pulmonary effort is normal. No respiratory distress.      Breath sounds: No wheezing.   Musculoskeletal:         General: No swelling, tenderness or deformity.      Cervical back: Normal range of motion and neck supple. No rigidity or tenderness.      Comments: Mild lymphedema    Lymphadenopathy:      Cervical: No cervical adenopathy.   Skin:     General: Skin is warm and dry.      Coloration: Skin is not jaundiced.      Findings: No rash.   Neurological:      General: No focal deficit present.      Mental Status: She is alert and oriented to person, place, and time. Mental status is at baseline.   Psychiatric:         Mood and Affect: Mood normal.         Behavior: Behavior normal.         Thought Content: Thought content normal.         Judgment: Judgment normal.         Laboratory Data:  Lab Results   Component Value Date    WBC 3.4 (L) 2024    HGB 8.4 (L) 2024    HCT 26.8 (L) 2024    MCV 72 (L) 2024     2024    ANC 0.94 (L) 2023       Chemistry    Lab Results    Component Value Date/Time     (L) 02/21/2024 1224    K 4.0 02/21/2024 1224    CL 94 (L) 02/21/2024 1224    CO2 28 02/21/2024 1224    BUN 16 02/21/2024 1224    CREATININE 1.09 (H) 02/21/2024 1224    Lab Results   Component Value Date/Time    CALCIUM 9.4 02/21/2024 1224    ALKPHOS 40 02/21/2024 1224    AST 33 02/21/2024 1224    ALT 20 02/21/2024 1224    BILITOT 0.8 02/21/2024 1224           Lab Results   Component Value Date    IRON 179 (H) 12/27/2023    TIBC 423 12/27/2023    FERRITIN 1,272 (H) 12/27/2023       Radiology:  NM PET CT bone skull base to mid thigh  Narrative: Interpreted By:  Fan Diop and Weaver Jakob   STUDY:  NM PET CT SKULL BASE TO MID THIGH;  3/7/2024 9:46 am      INDICATION:  Signs/Symptoms:breast cancer restaging.  Per EMR:  Patient is a 73-year-old female with history of breast cancer status  post right mastectomy 1991, recurrence in 1998 with metastases status  post right chest wall radiation. Patient with metastases to the left  iliac bone February 12, 2013 consistent with breast carcinoma (ER SC  positive, HER2 negative). Patient with history of right upper  extremity cutaneous T-cell lymphoma in 2016 status post resection,  focal radiation. Adenocarcinoma of the sigmoid colon status post  right hemicolectomy in 2014. Patient currently on abemaciclib. Most  recent CEA 2.2.      COMPARISON:  PET-CT 02/03/2023      ACCESSION NUMBER(S):  [id number]      ORDERING CLINICIAN:  TODD PEACE      TECHNIQUE:  DIVISION OF NUCLEAR MEDICINE  POSITRON EMISSION TOMOGRAPHY (PET-CT)      The patient received an intravenous dose of 11.6 mCi of Fluorine-18  fluorodeoxyglucose (FDG). Positron emission tomographic (PET) images  from skull base to mid-thighs were then acquired after a one hour  delay. Also acquired was a contemporaneous low dose non-contrast CT  scan performed for attenuation correction of PET images and anatomic  localization.  The PET and CT images were digitally fused  for  display.  All images were acquired on a combined PET-CT scanner unit.  Some areas of FDG accumulation may be described in standardized  uptake value (SUV) units.      CODING:  Initial Treatment Strategy (PI)      CALIBRATION:  Dose Injection-to-Scan Interval (mins): 61 min  Mediastinal bloodpool SUV (normal 1.5-2.5): 1.9  Blood glucose: 104 mg/dL      FINDINGS:  HEAD AND NECK:  No evidence of focal hypermetabolic lesion in the brain parenchyma,  noting that evaluation is limited because of the expected physiologic  diffuse FDG uptake in the brain. No focal hypermetabolic soft tissue  lesion is seen in the neck. No hypermetabolic cervical  lymphadenopathy is present.      CHEST:  Right apical pleural-parenchymal scarring without hypermetabolic  activity similar to 02/03/2023 PET-CT, likely post-radiation in  nature. Postsurgical changes of right mastectomy without abnormal  hypermetabolic activity in the surgical bed to suggest recurrent  disease. Left breast is unremarkable.      No evidence of hypermetabolic mediastinal, hilar or axillary  lymphadenopathy.      ABDOMEN AND PELVIS:  No hypermetabolic soft tissue lesion is present in the abdomen and  pelvis. Postsurgical changes of right hemicolectomy. Focal  hypermetabolic activity in the sigmoid (maximum SUV 4.3) is likely  physiologic in nature. No evidence of hypermetabolic lymphadenopathy.  Physiologic radiotracer uptake is present in the liver and spleen  with excretion into the bowel loops and the genitourinary tract.      MUSCULOSKELETAL/EXTREMITIES:  No hypermetabolic osseous lesions are identified throughout the axial  and appendicular skeleton. Areas of mild underlying sclerosis in the  lumbar vertebra and right sacral ala representing treated disease  similar to prior PET-CT      Impression: Postsurgical changes of right mastectomy and right hemicolectomy  without evidence of hypermetabolic residual or recurrent disease in  the postsurgical bed.  Sequela of treated disease in the lumbar  vertebra and right sacral ala similar to prior PET-CT. No new  hypermetabolic lymphadenopathy, osseous or cutaneous disease.      I personally reviewed the images/study and I agree with the findings  as stated by Nabil Duong MD. This study was interpreted at  [facility], [place], [place].      MACRO:  None      Signed by: Fan Diop 3/7/2024 12:50 PM  Dictation workstation:   [id number]  CT chest abdomen pelvis w IV contrast  Narrative: Interpreted By:  Johnny Canela,   STUDY:  CT CHEST ABDOMEN PELVIS W IV CONTRAST; ;  3/7/2024 8:43 am      INDICATION:  Signs/Symptoms:breast cancer restaging.      COMPARISON:  09/05/2023      ACCESSION NUMBER(S):  [id number]      ORDERING CLINICIAN:  TODD PEACE      TECHNIQUE:  Serial axial CT images obtained of the chest, abdomen, and pelvis  following intravenous administration of 75 mL of Omnipaque 350.  Images reformatted in the coronal and sagittal projection.      All CT examinations are performed with 1 or more of the following  dose reduction techniques: Automated exposure control, adjustment of  mA and/or kv according to patient's size, or use of iterative  reconstruction techniques.      FINDINGS:  CT chest:      Mediastinum demonstrates no lymphadenopathy. There is no hilar  lymphadenopathy. Esophagus is unremarkable.      Heart and great vessels demonstrate moderate vascular calcification  of the thoracic aorta. Ascending thoracic aorta measures 3.4 cm. Lung  parenchyma demonstrates stable appearing reticular opacities and  septal thickening at the right lung apex with component of pleural  thickening with findings unchanged from prior imaging. This may  reflect component of postradiation change. Also, linear bandlike  areas of airspace opacity in the right mid to upper lung zone are  stable. More focal nodularity demonstrated within the right midlung  laterally measuring 7 x 11  mm appearing unchanged (series 5 image  156) also, nodularity as measured in the right middle lobe near the  hemidiaphragm measuring 6 mm (series 5, image 184) appears stable.  There is small right basilar pleural effusion stable.      Left lung demonstrates areas of basilar linear appearing scar as well  as in the subpleural location at the left lung base. Focal area of  nodularity as measured within the superior segment of the left lower  lobe measures 4 mm (axial image 90) unchanged given differences in  slice position measurement.      Postsurgical changes are demonstrated status post right mastectomy  and breast reconstruction. There is no right axillary  lymphadenopathy. Stable postsurgical changes noted. No soft tissue  recurrence.      Visualized osseous structures within the thoracic spine demonstrate  no discrete lytic or sclerotic lesion.      CT abdomen:      Liver demonstrates a hypodensity within the right lobe of liver  posteriorly segment 7 measuring 8 mm unchanged given differences in  slice position measurement may reflect hepatic cyst.      Gallbladder demonstrates cholelithiasis      Spleen is unremarkable      Adrenal glands are unremarkable.      Pancreas is unremarkable      Right kidney is unremarkable      Left kidney is unremarkable      Retroperitoneum demonstrates mild vascular calcification of the  abdominal aorta. No lymphadenopathy. No ascites demonstrated      Loops of large bowel are unremarkable. Postsurgical changes status  post right hemicolectomy. Area of ileocolic anastomosis unremarkable.  Mesenteries is unremarkable. Stomach is unremarkable      CT pelvis:      Unopacified bladder is unremarkable. There is no pelvic  lymphadenopathy. No free fluid demonstrated. Uterus and adnexa are  unremarkable      Visualized osseous structures demonstrate right hip fixation. No  change in subtle sclerotic focus within the right sacral ala in the  S1 vertebral body segment measuring  proximally 1.8 cm. There is  moderate facet arthropathy lower lumbar spine. Anterolisthesis of L4  on L5 demonstrated. Multilevel degenerative discogenic change.  Central endplate depression along the superior endplate of the L2  vertebral body is stable. Also, mild endplate depression along  superior endplate of T12 with mild anterior wedging stable.                      Impression: 1. No new metastatic disease in the chest, abdomen, or pelvis.      2. Stable nodular foci within the lung parenchyma. Stable  pleural-parenchymal scarring and reticulation with linear bandlike  scar demonstrated in the right lung.      3. Postsurgical changes status post right mastectomy and  reconstruction with no evidence for local recurrence      4. Stable compression deformities T12 and L2.          MACRO:  None      Signed by: Johnny Canela 3/7/2024 11:33 AM  Dictation workstation:   [id number]        Assessment/Plan:  Santy Brody is a 73 y.o. female with a history of metastatic (lung and bone involvement) breast cancer, who presents today follow-up evaluation on fulvestrant and abemaciclib.    1. Stage IV breast cancer: She has lung and bone involvement.  - Reviewed CT and PET; no new or active disease noted.  - Currently on fulvestrant and abemaciclib, tolerating well with only side effect being occasional diarrhea.   - Continue abemaciclib 50 mg BID (dose-reduced for anemia).  - Grade 1 diarrhea. Takes Imodium occasionally.   - Grade 1 neuropathy in feet. Stable.  - Check CA27-29 at future visit.     Genomics for liquid bx (Foundation 1) 1/2022.  1. PIK3A  2. ERBB2  3. MSI- undetermined  4. VUS -- BRCA2 and ESR     2. Iron overload, anemia:   Hemoglobin electrophoresis shows beta-thalassemia minor.  - Recheck ferritin at next visit.    3. Bone metastasis, osteopenia:  She is not a candidate for further bisphosphonate therapy.  - Compression fractures (osteoporotic?) noted in spine without active disease in those areas.  -  No benefit to radiation in this case.  - Discussed use of Forteo as an option for bone health, but as this is a daily injection, she would like to avoid for now. If recurrent pain, could consider kyphoplasty.     4. Colon cancer:  No disease activity suspected. Most recent CEA is normal.     5. Lymphedema:  Stable. Encouraged compression sleeve use. Overall stable.     Rah Slater MD  Hematology and Medical Oncology  [facility]

## 2024-03-20 ENCOUNTER — INFUSION (OUTPATIENT)
Dept: HEMATOLOGY/ONCOLOGY | Facility: CLINIC | Age: 74
End: 2024-03-20
Payer: COMMERCIAL

## 2024-03-20 ENCOUNTER — LAB (OUTPATIENT)
Dept: LAB | Facility: CLINIC | Age: 74
End: 2024-03-20
Payer: COMMERCIAL

## 2024-03-20 ENCOUNTER — APPOINTMENT (OUTPATIENT)
Dept: HEMATOLOGY/ONCOLOGY | Facility: CLINIC | Age: 74
End: 2024-03-20
Payer: COMMERCIAL

## 2024-03-20 VITALS
RESPIRATION RATE: 18 BRPM | TEMPERATURE: 95.2 F | DIASTOLIC BLOOD PRESSURE: 74 MMHG | SYSTOLIC BLOOD PRESSURE: 133 MMHG | BODY MASS INDEX: 31.47 KG/M2 | WEIGHT: 163.47 LBS | HEART RATE: 73 BPM

## 2024-03-20 DIAGNOSIS — C50.919 MALIGNANT NEOPLASM OF FEMALE BREAST, UNSPECIFIED ESTROGEN RECEPTOR STATUS, UNSPECIFIED LATERALITY, UNSPECIFIED SITE OF BREAST (MULTI): ICD-10-CM

## 2024-03-20 DIAGNOSIS — C50.919 MALIGNANT NEOPLASM OF FEMALE BREAST, UNSPECIFIED ESTROGEN RECEPTOR STATUS, UNSPECIFIED LATERALITY, UNSPECIFIED SITE OF BREAST: ICD-10-CM

## 2024-03-20 DIAGNOSIS — C79.51 CARCINOMA OF BREAST METASTATIC TO BONE, UNSPECIFIED LATERALITY (MULTI): ICD-10-CM

## 2024-03-20 DIAGNOSIS — C78.00 CARCINOMA OF BREAST METASTATIC TO LUNG, UNSPECIFIED LATERALITY (MULTI): ICD-10-CM

## 2024-03-20 DIAGNOSIS — C50.919 CARCINOMA OF BREAST METASTATIC TO BONE, UNSPECIFIED LATERALITY (MULTI): ICD-10-CM

## 2024-03-20 DIAGNOSIS — C50.919 CARCINOMA OF BREAST METASTATIC TO LUNG, UNSPECIFIED LATERALITY (MULTI): ICD-10-CM

## 2024-03-20 LAB
25(OH)D3 SERPL-MCNC: 31 NG/ML (ref 30–100)
ALBUMIN SERPL BCP-MCNC: 4.2 G/DL (ref 3.4–5)
ALP SERPL-CCNC: 57 U/L (ref 33–136)
ALT SERPL W P-5'-P-CCNC: 13 U/L (ref 7–45)
ANION GAP SERPL CALC-SCNC: 11 MMOL/L (ref 10–20)
AST SERPL W P-5'-P-CCNC: 22 U/L (ref 9–39)
BASOPHILS # BLD AUTO: 0.04 X10*3/UL (ref 0–0.1)
BASOPHILS NFR BLD AUTO: 1.3 %
BILIRUB SERPL-MCNC: 0.8 MG/DL (ref 0–1.2)
BUN SERPL-MCNC: 18 MG/DL (ref 6–23)
CALCIUM SERPL-MCNC: 9.4 MG/DL (ref 8.6–10.3)
CANCER AG27-29 SERPL-ACNC: 89.5 U/ML (ref 0–38.6)
CHLORIDE SERPL-SCNC: 94 MMOL/L (ref 98–107)
CO2 SERPL-SCNC: 28 MMOL/L (ref 21–32)
CREAT SERPL-MCNC: 1.12 MG/DL (ref 0.5–1.05)
EGFRCR SERPLBLD CKD-EPI 2021: 53 ML/MIN/1.73M*2
EOSINOPHIL # BLD AUTO: 0.04 X10*3/UL (ref 0–0.4)
EOSINOPHIL NFR BLD AUTO: 1.3 %
ERYTHROCYTE [DISTWIDTH] IN BLOOD BY AUTOMATED COUNT: 15.9 % (ref 11.5–14.5)
FERRITIN SERPL-MCNC: 1226 NG/ML (ref 8–150)
GLUCOSE SERPL-MCNC: 106 MG/DL (ref 74–99)
HCT VFR BLD AUTO: 28.5 % (ref 36–46)
HGB BLD-MCNC: 9.1 G/DL (ref 12–16)
IMM GRANULOCYTES # BLD AUTO: 0.02 X10*3/UL (ref 0–0.5)
IMM GRANULOCYTES NFR BLD AUTO: 0.7 % (ref 0–0.9)
IRON SATN MFR SERPL: 31 % (ref 25–45)
IRON SERPL-MCNC: 127 UG/DL (ref 35–150)
LYMPHOCYTES # BLD AUTO: 1.02 X10*3/UL (ref 0.8–3)
LYMPHOCYTES NFR BLD AUTO: 34.2 %
MCH RBC QN AUTO: 22.9 PG (ref 26–34)
MCHC RBC AUTO-ENTMCNC: 31.9 G/DL (ref 32–36)
MCV RBC AUTO: 72 FL (ref 80–100)
MONOCYTES # BLD AUTO: 0.39 X10*3/UL (ref 0.05–0.8)
MONOCYTES NFR BLD AUTO: 13.1 %
NEUTROPHILS # BLD AUTO: 1.47 X10*3/UL (ref 1.6–5.5)
NEUTROPHILS NFR BLD AUTO: 49.4 %
NRBC BLD-RTO: 2 /100 WBCS (ref 0–0)
PLATELET # BLD AUTO: 222 X10*3/UL (ref 150–450)
POTASSIUM SERPL-SCNC: 4 MMOL/L (ref 3.5–5.3)
PROT SERPL-MCNC: 7.6 G/DL (ref 6.4–8.2)
RBC # BLD AUTO: 3.97 X10*6/UL (ref 4–5.2)
SODIUM SERPL-SCNC: 129 MMOL/L (ref 136–145)
TIBC SERPL-MCNC: 411 UG/DL (ref 240–445)
UIBC SERPL-MCNC: 284 UG/DL (ref 110–370)
WBC # BLD AUTO: 3 X10*3/UL (ref 4.4–11.3)

## 2024-03-20 PROCEDURE — 86300 IMMUNOASSAY TUMOR CA 15-3: CPT | Mod: WESLAB

## 2024-03-20 PROCEDURE — 83540 ASSAY OF IRON: CPT | Mod: WESLAB

## 2024-03-20 PROCEDURE — 82306 VITAMIN D 25 HYDROXY: CPT | Mod: WESLAB

## 2024-03-20 PROCEDURE — 80053 COMPREHEN METABOLIC PANEL: CPT

## 2024-03-20 PROCEDURE — 82728 ASSAY OF FERRITIN: CPT | Mod: WESLAB

## 2024-03-20 PROCEDURE — 96402 CHEMO HORMON ANTINEOPL SQ/IM: CPT

## 2024-03-20 PROCEDURE — 96372 THER/PROPH/DIAG INJ SC/IM: CPT | Mod: 59 | Performed by: INTERNAL MEDICINE

## 2024-03-20 PROCEDURE — 85025 COMPLETE CBC W/AUTO DIFF WBC: CPT

## 2024-03-20 PROCEDURE — 36415 COLL VENOUS BLD VENIPUNCTURE: CPT

## 2024-03-20 PROCEDURE — 2500000004 HC RX 250 GENERAL PHARMACY W/ HCPCS (ALT 636 FOR OP/ED): Mod: JZ,JG | Performed by: INTERNAL MEDICINE

## 2024-03-20 RX ORDER — ALBUTEROL SULFATE 0.83 MG/ML
3 SOLUTION RESPIRATORY (INHALATION) AS NEEDED
Status: DISCONTINUED | OUTPATIENT
Start: 2024-03-20 | End: 2024-03-20 | Stop reason: HOSPADM

## 2024-03-20 RX ORDER — DIPHENHYDRAMINE HYDROCHLORIDE 50 MG/ML
50 INJECTION INTRAMUSCULAR; INTRAVENOUS AS NEEDED
Status: DISCONTINUED | OUTPATIENT
Start: 2024-03-20 | End: 2024-03-20 | Stop reason: HOSPADM

## 2024-03-20 RX ORDER — FAMOTIDINE 10 MG/ML
20 INJECTION INTRAVENOUS ONCE AS NEEDED
Status: DISCONTINUED | OUTPATIENT
Start: 2024-03-20 | End: 2024-03-20 | Stop reason: HOSPADM

## 2024-03-20 RX ORDER — LAMOTRIGINE 25 MG/1
500 TABLET ORAL ONCE
Status: COMPLETED | OUTPATIENT
Start: 2024-03-20 | End: 2024-03-20

## 2024-03-20 RX ORDER — EPINEPHRINE 0.3 MG/.3ML
0.3 INJECTION SUBCUTANEOUS EVERY 5 MIN PRN
Status: DISCONTINUED | OUTPATIENT
Start: 2024-03-20 | End: 2024-03-20 | Stop reason: HOSPADM

## 2024-03-20 RX ADMIN — FULVESTRANT 500 MG: 50 INJECTION, SOLUTION INTRAMUSCULAR at 13:35

## 2024-03-20 ASSESSMENT — PAIN SCALES - GENERAL: PAINLEVEL: 0-NO PAIN

## 2024-04-12 DIAGNOSIS — Z12.11 COLON CANCER SCREENING: ICD-10-CM

## 2024-04-12 RX ORDER — POLYETHYLENE GLYCOL 3350, SODIUM SULFATE ANHYDROUS, SODIUM BICARBONATE, SODIUM CHLORIDE, POTASSIUM CHLORIDE 236; 22.74; 6.74; 5.86; 2.97 G/4L; G/4L; G/4L; G/4L; G/4L
4000 POWDER, FOR SOLUTION ORAL ONCE
Qty: 4000 ML | Refills: 0 | OUTPATIENT
Start: 2024-04-12 | End: 2024-04-16

## 2024-04-15 DIAGNOSIS — Z12.11 COLON CANCER SCREENING: ICD-10-CM

## 2024-04-15 RX ORDER — POLYETHYLENE GLYCOL 3350, SODIUM SULFATE ANHYDROUS, SODIUM BICARBONATE, SODIUM CHLORIDE, POTASSIUM CHLORIDE 236; 22.74; 6.74; 5.86; 2.97 G/4L; G/4L; G/4L; G/4L; G/4L
POWDER, FOR SOLUTION ORAL
Qty: 4000 ML | Refills: 0 | Status: SHIPPED | OUTPATIENT
Start: 2024-04-15

## 2024-04-16 ENCOUNTER — HOSPITAL ENCOUNTER (OUTPATIENT)
Dept: OPERATING ROOM | Facility: CLINIC | Age: 74
Setting detail: OUTPATIENT SURGERY
Discharge: HOME | End: 2024-04-16
Payer: COMMERCIAL

## 2024-04-16 VITALS
DIASTOLIC BLOOD PRESSURE: 68 MMHG | TEMPERATURE: 97.5 F | RESPIRATION RATE: 16 BRPM | BODY MASS INDEX: 31.15 KG/M2 | WEIGHT: 161.82 LBS | SYSTOLIC BLOOD PRESSURE: 128 MMHG | OXYGEN SATURATION: 99 % | HEART RATE: 69 BPM

## 2024-04-16 DIAGNOSIS — C18.7 CANCER OF SIGMOID COLON (MULTI): Primary | ICD-10-CM

## 2024-04-16 DIAGNOSIS — Z85.038 PERSONAL HISTORY OF COLON CANCER: ICD-10-CM

## 2024-04-16 PROCEDURE — 99152 MOD SED SAME PHYS/QHP 5/>YRS: CPT

## 2024-04-16 PROCEDURE — 7100000010 HC PHASE TWO TIME - EACH INCREMENTAL 1 MINUTE

## 2024-04-16 PROCEDURE — 45378 DIAGNOSTIC COLONOSCOPY: CPT | Performed by: INTERNAL MEDICINE

## 2024-04-16 PROCEDURE — 3600000002 HC OR TIME - INITIAL BASE CHARGE - PROCEDURE LEVEL TWO

## 2024-04-16 PROCEDURE — 7100000009 HC PHASE TWO TIME - INITIAL BASE CHARGE

## 2024-04-16 PROCEDURE — 3600000007 HC OR TIME - EACH INCREMENTAL 1 MINUTE - PROCEDURE LEVEL TWO

## 2024-04-16 PROCEDURE — 2500000004 HC RX 250 GENERAL PHARMACY W/ HCPCS (ALT 636 FOR OP/ED): Performed by: INTERNAL MEDICINE

## 2024-04-16 RX ORDER — MIDAZOLAM HYDROCHLORIDE 1 MG/ML
INJECTION, SOLUTION INTRAMUSCULAR; INTRAVENOUS AS NEEDED
Status: COMPLETED | OUTPATIENT
Start: 2024-04-16 | End: 2024-04-16

## 2024-04-16 RX ORDER — FENTANYL CITRATE 50 UG/ML
INJECTION, SOLUTION INTRAMUSCULAR; INTRAVENOUS AS NEEDED
Status: COMPLETED | OUTPATIENT
Start: 2024-04-16 | End: 2024-04-16

## 2024-04-16 RX ORDER — SODIUM CHLORIDE, SODIUM LACTATE, POTASSIUM CHLORIDE, CALCIUM CHLORIDE 600; 310; 30; 20 MG/100ML; MG/100ML; MG/100ML; MG/100ML
20 INJECTION, SOLUTION INTRAVENOUS CONTINUOUS
Status: CANCELLED | OUTPATIENT
Start: 2024-04-16

## 2024-04-16 RX ADMIN — FENTANYL CITRATE 25 MCG: 50 INJECTION, SOLUTION INTRAMUSCULAR; INTRAVENOUS at 11:42

## 2024-04-16 RX ADMIN — FENTANYL CITRATE 25 MCG: 50 INJECTION, SOLUTION INTRAMUSCULAR; INTRAVENOUS at 11:46

## 2024-04-16 RX ADMIN — FENTANYL CITRATE 50 MCG: 50 INJECTION, SOLUTION INTRAMUSCULAR; INTRAVENOUS at 11:40

## 2024-04-16 RX ADMIN — MIDAZOLAM 1 MG: 1 INJECTION INTRAMUSCULAR; INTRAVENOUS at 11:46

## 2024-04-16 RX ADMIN — MIDAZOLAM 1 MG: 1 INJECTION INTRAMUSCULAR; INTRAVENOUS at 11:42

## 2024-04-16 RX ADMIN — MIDAZOLAM 2 MG: 1 INJECTION INTRAMUSCULAR; INTRAVENOUS at 11:40

## 2024-04-16 ASSESSMENT — PAIN SCALES - GENERAL
PAINLEVEL_OUTOF10: 0 - NO PAIN
PAINLEVEL_OUTOF10: 3
PAINLEVEL_OUTOF10: 0 - NO PAIN

## 2024-04-16 ASSESSMENT — PAIN - FUNCTIONAL ASSESSMENT
PAIN_FUNCTIONAL_ASSESSMENT: 0-10

## 2024-04-16 NOTE — H&P
History Of Present Illness  Santy Brody is a 73 y.o. female presenting with personal history of colon cancer with last colonoscopy in 2018 here for surveillance colonoscopy.     Past Medical History  Past Medical History:   Diagnosis Date    Abnormal finding of blood chemistry, unspecified 10/30/2018    Abnormal blood chemistry    Abnormal findings on diagnostic imaging of other specified body structures 01/10/2014    Abnormal finding on imaging    Acute upper respiratory infection, unspecified 05/21/2019    Upper respiratory infection, viral    Bilateral foot pain     Body mass index (BMI) 29.0-29.9, adult 08/18/2022    BMI 29.0-29.9,adult    Body mass index (BMI) 29.0-29.9, adult 02/17/2022    BMI 29.0-29.9,adult    Body mass index (BMI)30.0-30.9, adult 09/14/2021    BMI 30.0-30.9,adult    Encounter for immunization     COVID-19 vaccine administered    Encounter for screening for malignant neoplasm of colon 07/11/2022    Screen for colon cancer    Encounter for screening mammogram for malignant neoplasm of breast     Visit for screening mammogram    Hx antineoplastic chemo 1992    Localized edema 01/14/2020    Pedal edema    Malignant neoplasm of colon, unspecified (Multi) 06/23/2015    Adenocarcinoma of colon    Malignant neoplasm of unspecified site of unspecified female breast (Multi) 04/18/2021    Adenocarcinoma of breast    Other conditions influencing health status     DEXA Body Composition Study    Personal history of diseases of the skin and subcutaneous tissue 06/23/2016    History of acne    Personal history of diseases of the skin and subcutaneous tissue 04/23/2015    History of impetigo    Personal history of irradiation 1992    Personal history of other diseases of the female genital tract     History of endometriosis    Personal history of other diseases of the nervous system and sense organs 09/14/2021    History of restless legs syndrome    Personal history of other diseases of the respiratory  system 02/24/2016    History of paranasal sinus congestion    Personal history of other endocrine, nutritional and metabolic disease 12/17/2014    History of obesity    Personal history of other infectious and parasitic diseases 07/29/2015    History of herpes zoster    Personal history of other malignant neoplasm of large intestine 02/17/2022    History of other malignant neoplasm of large intestine    Personal history of other specified conditions 02/24/2016    History of postnasal drip    Personal history of other specified conditions 01/02/2014    History of fatigue    Personal history of other specified conditions 01/02/2014    History of shortness of breath    Stress fracture, unspecified femur, initial encounter for fracture 08/24/2017    Stress fracture of femoral shaft    Unspecified open wound, right lower leg, initial encounter 09/10/2019    Leg wound, right     Surgical History  Past Surgical History:   Procedure Laterality Date    COLECTOMY  12/19/2016    Partial Colectomy    COLONOSCOPY  06/26/2013    Complete Colonoscopy    MASTECTOMY  07/31/2013    Breast Surgery Mastectomy    OTHER SURGICAL HISTORY  07/31/2013    Oophorectomy - Bilateral (Removal Of Both Ovaries)    OTHER SURGICAL HISTORY  07/31/2013    Arm Excision Of Soft Tissue Tumor    OTHER SURGICAL HISTORY  07/31/2013    Breast Surgery Reconstruction    OTHER SURGICAL HISTORY  02/19/2020    Laparoscopic endometrioma fulguration     Social History  She reports that she quit smoking about 40 years ago. Her smoking use included cigarettes. She has never used smokeless tobacco. She reports that she does not currently use alcohol. She reports that she does not use drugs.    Family History  Family History   Problem Relation Name Age of Onset    Hyperlipidemia Mother      Hypertension Mother      Hypertension Sister      Diabetes Other Maternal Aunt         Allergies  Allergies   Allergen Reactions    Denosumab Unknown and Other     Xgeva: Adverse  Reaction: Osteoporosis with fracture    Should avoid all Biphosphonates     Review of Systems    A 10+ point review of systems was completed and otherwise negative.    Pre-sedation Evaluation:  ASA Classification - ASA 2 - Patient with mild systemic disease with no functional limitations  Mallampati Score - II (hard and soft palate, upper portion of tonsils anduvula visible)    Physical Exam  CONSTITUTIONAL: no acute distress, appears stated age  PULMONARY: clear to auscultation bilaterally  CARDIOVASCULAR: regular rate and rhythm  ABDOMEN: soft, non-tender  NEUROLOGIC: alert and oriented to person/place/time       Last Recorded Vitals  Blood pressure 166/83, pulse 87, temperature 36.2 °C (97.2 °F), temperature source Temporal, resp. rate 20, weight 73.4 kg (161 lb 13.1 oz), SpO2 99%.     Assessment/Plan   Will proceed with surveillance colonoscopy     PTA/Current Medications:  (Not in a hospital admission)    Current Outpatient Medications   Medication Sig Dispense Refill    abemaciclib (Verzenio) 50 mg tablet Take 1 tablet (50 mg total) by mouth 2 times a day.  Swallow whole. 56 tablet 2    calcium carbonate-vitamin D3 600 mg-5 mcg (200 unit) tablet Take 1 tablet by mouth 2 times a day.      cranberry extract 200 mg capsule Take by mouth.      fenofibrate (Tricor) 54 mg tablet Take 1 tablet (54 mg) by mouth once daily in the morning. Take before meals. 90 tablet 1    fluticasone (Flonase) 50 mcg/actuation nasal spray Administer 2 sprays into each nostril once daily.      gabapentin (Neurontin) 100 mg capsule Take 1 capsule (100 mg) by mouth once daily at bedtime. 90 capsule 1    lisinopril 10 mg tablet Take 1 tablet (10 mg) by mouth once daily. 90 tablet 1    omega-3/dha/epa/fish oil (OMEGA-3 ORAL) Take by mouth. Omega 3 EPA + DHA 1000 MG Oral Capsule; Take 2 capsules by mouth twice daily.      polyethylene glycol-electrolytes (Golytely) 420 gram solution STARTING AT 6PM DRINK HALF OF THE BOTTLE, DRINK THE OTHER  HALF 5 HRS BEFORE PROCEDURE TIME 4000 mL 0    furosemide (Lasix) 20 mg tablet TAKE ONE TABLET BY MOUTH EVERY DAY 90 tablet 0    triamcinolone (Kenalog) 0.1 % cream Apply 1 Film topically in the morning and 1 Film before bedtime.      TURMERIC ORAL        No current facility-administered medications for this encounter.     Tera Mccurdy MD

## 2024-04-17 ENCOUNTER — INFUSION (OUTPATIENT)
Dept: HEMATOLOGY/ONCOLOGY | Facility: CLINIC | Age: 74
End: 2024-04-17
Payer: COMMERCIAL

## 2024-04-17 VITALS
OXYGEN SATURATION: 98 % | TEMPERATURE: 96.8 F | SYSTOLIC BLOOD PRESSURE: 146 MMHG | HEART RATE: 104 BPM | RESPIRATION RATE: 18 BRPM | DIASTOLIC BLOOD PRESSURE: 93 MMHG | BODY MASS INDEX: 31.15 KG/M2 | WEIGHT: 161.82 LBS

## 2024-04-17 DIAGNOSIS — C50.919 MALIGNANT NEOPLASM OF FEMALE BREAST, UNSPECIFIED ESTROGEN RECEPTOR STATUS, UNSPECIFIED LATERALITY, UNSPECIFIED SITE OF BREAST (MULTI): ICD-10-CM

## 2024-04-17 PROCEDURE — 2500000004 HC RX 250 GENERAL PHARMACY W/ HCPCS (ALT 636 FOR OP/ED): Mod: JZ,JG | Performed by: INTERNAL MEDICINE

## 2024-04-17 PROCEDURE — 96402 CHEMO HORMON ANTINEOPL SQ/IM: CPT

## 2024-04-17 RX ORDER — LAMOTRIGINE 25 MG/1
500 TABLET ORAL ONCE
Status: COMPLETED | OUTPATIENT
Start: 2024-04-17 | End: 2024-04-17

## 2024-04-17 RX ORDER — ALBUTEROL SULFATE 0.83 MG/ML
3 SOLUTION RESPIRATORY (INHALATION) AS NEEDED
Status: DISCONTINUED | OUTPATIENT
Start: 2024-04-17 | End: 2024-04-17 | Stop reason: HOSPADM

## 2024-04-17 RX ORDER — DIPHENHYDRAMINE HYDROCHLORIDE 50 MG/ML
50 INJECTION INTRAMUSCULAR; INTRAVENOUS AS NEEDED
Status: DISCONTINUED | OUTPATIENT
Start: 2024-04-17 | End: 2024-04-17 | Stop reason: HOSPADM

## 2024-04-17 RX ORDER — FAMOTIDINE 10 MG/ML
20 INJECTION INTRAVENOUS ONCE AS NEEDED
Status: DISCONTINUED | OUTPATIENT
Start: 2024-04-17 | End: 2024-04-17 | Stop reason: HOSPADM

## 2024-04-17 RX ORDER — EPINEPHRINE 0.3 MG/.3ML
0.3 INJECTION SUBCUTANEOUS EVERY 5 MIN PRN
Status: DISCONTINUED | OUTPATIENT
Start: 2024-04-17 | End: 2024-04-17 | Stop reason: HOSPADM

## 2024-04-17 RX ADMIN — FULVESTRANT 500 MG: 50 INJECTION, SOLUTION INTRAMUSCULAR at 14:17

## 2024-04-17 ASSESSMENT — PAIN SCALES - GENERAL: PAINLEVEL: 0-NO PAIN

## 2024-05-15 ENCOUNTER — LAB (OUTPATIENT)
Dept: LAB | Facility: CLINIC | Age: 74
End: 2024-05-15
Payer: COMMERCIAL

## 2024-05-15 ENCOUNTER — INFUSION (OUTPATIENT)
Dept: HEMATOLOGY/ONCOLOGY | Facility: CLINIC | Age: 74
End: 2024-05-15
Payer: COMMERCIAL

## 2024-05-15 VITALS
WEIGHT: 163.14 LBS | HEART RATE: 74 BPM | DIASTOLIC BLOOD PRESSURE: 81 MMHG | TEMPERATURE: 96.3 F | BODY MASS INDEX: 31.41 KG/M2 | SYSTOLIC BLOOD PRESSURE: 161 MMHG | OXYGEN SATURATION: 98 % | RESPIRATION RATE: 18 BRPM

## 2024-05-15 DIAGNOSIS — C50.919 MALIGNANT NEOPLASM OF FEMALE BREAST, UNSPECIFIED ESTROGEN RECEPTOR STATUS, UNSPECIFIED LATERALITY, UNSPECIFIED SITE OF BREAST (MULTI): ICD-10-CM

## 2024-05-15 LAB
25(OH)D3 SERPL-MCNC: 33 NG/ML (ref 30–100)
ALBUMIN SERPL BCP-MCNC: 4.4 G/DL (ref 3.4–5)
ALP SERPL-CCNC: 42 U/L (ref 33–136)
ALT SERPL W P-5'-P-CCNC: 14 U/L (ref 7–45)
ANION GAP SERPL CALC-SCNC: 12 MMOL/L (ref 10–20)
AST SERPL W P-5'-P-CCNC: 25 U/L (ref 9–39)
BASOPHILS # BLD AUTO: 0.03 X10*3/UL (ref 0–0.1)
BASOPHILS NFR BLD AUTO: 1.1 %
BILIRUB SERPL-MCNC: 1 MG/DL (ref 0–1.2)
BUN SERPL-MCNC: 17 MG/DL (ref 6–23)
CALCIUM SERPL-MCNC: 9.4 MG/DL (ref 8.6–10.3)
CANCER AG27-29 SERPL-ACNC: 99.2 U/ML (ref 0–38.6)
CHLORIDE SERPL-SCNC: 98 MMOL/L (ref 98–107)
CO2 SERPL-SCNC: 29 MMOL/L (ref 21–32)
CREAT SERPL-MCNC: 1.26 MG/DL (ref 0.5–1.05)
EGFRCR SERPLBLD CKD-EPI 2021: 46 ML/MIN/1.73M*2
EOSINOPHIL # BLD AUTO: 0.05 X10*3/UL (ref 0–0.4)
EOSINOPHIL NFR BLD AUTO: 1.9 %
ERYTHROCYTE [DISTWIDTH] IN BLOOD BY AUTOMATED COUNT: 16.7 % (ref 11.5–14.5)
FERRITIN SERPL-MCNC: 1279 NG/ML (ref 8–150)
GLUCOSE SERPL-MCNC: 117 MG/DL (ref 74–99)
HCT VFR BLD AUTO: 28.8 % (ref 36–46)
HGB BLD-MCNC: 8.9 G/DL (ref 12–16)
IMM GRANULOCYTES # BLD AUTO: 0.02 X10*3/UL (ref 0–0.5)
IMM GRANULOCYTES NFR BLD AUTO: 0.8 % (ref 0–0.9)
IRON SATN MFR SERPL: 42 % (ref 25–45)
IRON SERPL-MCNC: 173 UG/DL (ref 35–150)
LYMPHOCYTES # BLD AUTO: 1.04 X10*3/UL (ref 0.8–3)
LYMPHOCYTES NFR BLD AUTO: 39.7 %
MCH RBC QN AUTO: 22.5 PG (ref 26–34)
MCHC RBC AUTO-ENTMCNC: 30.9 G/DL (ref 32–36)
MCV RBC AUTO: 73 FL (ref 80–100)
MONOCYTES # BLD AUTO: 0.29 X10*3/UL (ref 0.05–0.8)
MONOCYTES NFR BLD AUTO: 11.1 %
NEUTROPHILS # BLD AUTO: 1.19 X10*3/UL (ref 1.6–5.5)
NEUTROPHILS NFR BLD AUTO: 45.4 %
NRBC BLD-RTO: 1.5 /100 WBCS (ref 0–0)
PLATELET # BLD AUTO: 223 X10*3/UL (ref 150–450)
POTASSIUM SERPL-SCNC: 4 MMOL/L (ref 3.5–5.3)
PROT SERPL-MCNC: 7.8 G/DL (ref 6.4–8.2)
RBC # BLD AUTO: 3.96 X10*6/UL (ref 4–5.2)
SODIUM SERPL-SCNC: 135 MMOL/L (ref 136–145)
TIBC SERPL-MCNC: 414 UG/DL (ref 240–445)
UIBC SERPL-MCNC: 241 UG/DL (ref 110–370)
WBC # BLD AUTO: 2.6 X10*3/UL (ref 4.4–11.3)

## 2024-05-15 PROCEDURE — 96402 CHEMO HORMON ANTINEOPL SQ/IM: CPT

## 2024-05-15 PROCEDURE — 83540 ASSAY OF IRON: CPT | Mod: WESLAB

## 2024-05-15 PROCEDURE — 86300 IMMUNOASSAY TUMOR CA 15-3: CPT | Mod: WESLAB

## 2024-05-15 PROCEDURE — 82728 ASSAY OF FERRITIN: CPT | Mod: WESLAB

## 2024-05-15 PROCEDURE — 2500000004 HC RX 250 GENERAL PHARMACY W/ HCPCS (ALT 636 FOR OP/ED): Mod: JZ,JG | Performed by: INTERNAL MEDICINE

## 2024-05-15 PROCEDURE — 82306 VITAMIN D 25 HYDROXY: CPT | Mod: WESLAB

## 2024-05-15 PROCEDURE — 85025 COMPLETE CBC W/AUTO DIFF WBC: CPT

## 2024-05-15 PROCEDURE — 36415 COLL VENOUS BLD VENIPUNCTURE: CPT

## 2024-05-15 PROCEDURE — 80053 COMPREHEN METABOLIC PANEL: CPT

## 2024-05-15 RX ORDER — DIPHENHYDRAMINE HYDROCHLORIDE 50 MG/ML
50 INJECTION INTRAMUSCULAR; INTRAVENOUS AS NEEDED
Status: DISCONTINUED | OUTPATIENT
Start: 2024-05-15 | End: 2024-05-15 | Stop reason: HOSPADM

## 2024-05-15 RX ORDER — LAMOTRIGINE 25 MG/1
500 TABLET ORAL ONCE
Status: COMPLETED | OUTPATIENT
Start: 2024-05-15 | End: 2024-05-15

## 2024-05-15 RX ORDER — FAMOTIDINE 10 MG/ML
20 INJECTION INTRAVENOUS ONCE AS NEEDED
Status: DISCONTINUED | OUTPATIENT
Start: 2024-05-15 | End: 2024-05-15 | Stop reason: HOSPADM

## 2024-05-15 RX ORDER — EPINEPHRINE 0.3 MG/.3ML
0.3 INJECTION SUBCUTANEOUS EVERY 5 MIN PRN
Status: DISCONTINUED | OUTPATIENT
Start: 2024-05-15 | End: 2024-05-15 | Stop reason: HOSPADM

## 2024-05-15 RX ORDER — ALBUTEROL SULFATE 0.83 MG/ML
3 SOLUTION RESPIRATORY (INHALATION) AS NEEDED
Status: DISCONTINUED | OUTPATIENT
Start: 2024-05-15 | End: 2024-05-15 | Stop reason: HOSPADM

## 2024-05-15 RX ADMIN — FULVESTRANT 500 MG: 50 INJECTION, SOLUTION INTRAMUSCULAR at 12:47

## 2024-05-15 ASSESSMENT — PAIN SCALES - GENERAL: PAINLEVEL: 0-NO PAIN

## 2024-06-05 ENCOUNTER — TELEPHONE (OUTPATIENT)
Dept: HEMATOLOGY/ONCOLOGY | Facility: HOSPITAL | Age: 74
End: 2024-06-05
Payer: COMMERCIAL

## 2024-06-05 DIAGNOSIS — Z17.0 MALIGNANT NEOPLASM OF BREAST IN FEMALE, ESTROGEN RECEPTOR POSITIVE, UNSPECIFIED LATERALITY, UNSPECIFIED SITE OF BREAST (MULTI): Primary | ICD-10-CM

## 2024-06-05 DIAGNOSIS — C50.919 MALIGNANT NEOPLASM OF BREAST IN FEMALE, ESTROGEN RECEPTOR POSITIVE, UNSPECIFIED LATERALITY, UNSPECIFIED SITE OF BREAST (MULTI): Primary | ICD-10-CM

## 2024-06-05 NOTE — TELEPHONE ENCOUNTER
Refill request received for Verzenio by   [facility].  Message sent to team and to clinical pharmacist.

## 2024-06-12 ENCOUNTER — OFFICE VISIT (OUTPATIENT)
Dept: HEMATOLOGY/ONCOLOGY | Facility: CLINIC | Age: 74
End: 2024-06-12
Payer: COMMERCIAL

## 2024-06-12 ENCOUNTER — INFUSION (OUTPATIENT)
Dept: HEMATOLOGY/ONCOLOGY | Facility: CLINIC | Age: 74
End: 2024-06-12
Payer: COMMERCIAL

## 2024-06-12 ENCOUNTER — LAB (OUTPATIENT)
Dept: LAB | Facility: CLINIC | Age: 74
End: 2024-06-12
Payer: COMMERCIAL

## 2024-06-12 VITALS
WEIGHT: 163.58 LBS | TEMPERATURE: 97.2 F | SYSTOLIC BLOOD PRESSURE: 134 MMHG | RESPIRATION RATE: 18 BRPM | BODY MASS INDEX: 31.49 KG/M2 | OXYGEN SATURATION: 94 % | HEART RATE: 93 BPM | DIASTOLIC BLOOD PRESSURE: 77 MMHG

## 2024-06-12 DIAGNOSIS — C50.919 CARCINOMA OF BREAST METASTATIC TO BONE, UNSPECIFIED LATERALITY (MULTI): Primary | ICD-10-CM

## 2024-06-12 DIAGNOSIS — G62.0 NEUROPATHY DUE TO CHEMOTHERAPEUTIC DRUG (MULTI): ICD-10-CM

## 2024-06-12 DIAGNOSIS — C79.51 CARCINOMA OF BREAST METASTATIC TO BONE, UNSPECIFIED LATERALITY (MULTI): ICD-10-CM

## 2024-06-12 DIAGNOSIS — C50.919 MALIGNANT NEOPLASM OF FEMALE BREAST, UNSPECIFIED ESTROGEN RECEPTOR STATUS, UNSPECIFIED LATERALITY, UNSPECIFIED SITE OF BREAST (MULTI): ICD-10-CM

## 2024-06-12 DIAGNOSIS — T45.1X5A NEUROPATHY DUE TO CHEMOTHERAPEUTIC DRUG (MULTI): ICD-10-CM

## 2024-06-12 DIAGNOSIS — M79.18 MUSCULOSKELETAL PAIN: ICD-10-CM

## 2024-06-12 DIAGNOSIS — C79.51 CARCINOMA OF BREAST METASTATIC TO BONE, UNSPECIFIED LATERALITY (MULTI): Primary | ICD-10-CM

## 2024-06-12 DIAGNOSIS — C50.919 CARCINOMA OF BREAST METASTATIC TO BONE, UNSPECIFIED LATERALITY (MULTI): ICD-10-CM

## 2024-06-12 LAB
ALBUMIN SERPL BCP-MCNC: 4.3 G/DL (ref 3.4–5)
ALP SERPL-CCNC: 59 U/L (ref 33–136)
ALT SERPL W P-5'-P-CCNC: 15 U/L (ref 7–45)
ANION GAP SERPL CALC-SCNC: 12 MMOL/L (ref 10–20)
AST SERPL W P-5'-P-CCNC: 27 U/L (ref 9–39)
BASOPHILS # BLD AUTO: 0.03 X10*3/UL (ref 0–0.1)
BASOPHILS NFR BLD AUTO: 1 %
BILIRUB SERPL-MCNC: 0.8 MG/DL (ref 0–1.2)
BUN SERPL-MCNC: 21 MG/DL (ref 6–23)
CALCIUM SERPL-MCNC: 9.2 MG/DL (ref 8.6–10.3)
CHLORIDE SERPL-SCNC: 96 MMOL/L (ref 98–107)
CO2 SERPL-SCNC: 29 MMOL/L (ref 21–32)
CREAT SERPL-MCNC: 1.34 MG/DL (ref 0.5–1.05)
EGFRCR SERPLBLD CKD-EPI 2021: 42 ML/MIN/1.73M*2
EOSINOPHIL # BLD AUTO: 0.04 X10*3/UL (ref 0–0.4)
EOSINOPHIL NFR BLD AUTO: 1.4 %
ERYTHROCYTE [DISTWIDTH] IN BLOOD BY AUTOMATED COUNT: 16.7 % (ref 11.5–14.5)
FERRITIN SERPL-MCNC: 1168 NG/ML (ref 8–150)
GLUCOSE SERPL-MCNC: 125 MG/DL (ref 74–99)
HCT VFR BLD AUTO: 27.7 % (ref 36–46)
HGB BLD-MCNC: 8.9 G/DL (ref 12–16)
IMM GRANULOCYTES # BLD AUTO: 0.01 X10*3/UL (ref 0–0.5)
IMM GRANULOCYTES NFR BLD AUTO: 0.3 % (ref 0–0.9)
LYMPHOCYTES # BLD AUTO: 0.81 X10*3/UL (ref 0.8–3)
LYMPHOCYTES NFR BLD AUTO: 27.6 %
MAGNESIUM SERPL-MCNC: 1.86 MG/DL (ref 1.6–2.4)
MCH RBC QN AUTO: 23.3 PG (ref 26–34)
MCHC RBC AUTO-ENTMCNC: 32.1 G/DL (ref 32–36)
MCV RBC AUTO: 73 FL (ref 80–100)
MONOCYTES # BLD AUTO: 0.33 X10*3/UL (ref 0.05–0.8)
MONOCYTES NFR BLD AUTO: 11.3 %
NEUTROPHILS # BLD AUTO: 1.71 X10*3/UL (ref 1.6–5.5)
NEUTROPHILS NFR BLD AUTO: 58.4 %
NRBC BLD-RTO: 1.7 /100 WBCS (ref 0–0)
PLATELET # BLD AUTO: 233 X10*3/UL (ref 150–450)
POTASSIUM SERPL-SCNC: 3.6 MMOL/L (ref 3.5–5.3)
PROT SERPL-MCNC: 7.4 G/DL (ref 6.4–8.2)
RBC # BLD AUTO: 3.82 X10*6/UL (ref 4–5.2)
SODIUM SERPL-SCNC: 133 MMOL/L (ref 136–145)
WBC # BLD AUTO: 2.9 X10*3/UL (ref 4.4–11.3)

## 2024-06-12 PROCEDURE — 82728 ASSAY OF FERRITIN: CPT

## 2024-06-12 PROCEDURE — 1036F TOBACCO NON-USER: CPT

## 2024-06-12 PROCEDURE — 36415 COLL VENOUS BLD VENIPUNCTURE: CPT

## 2024-06-12 PROCEDURE — 99214 OFFICE O/P EST MOD 30 MIN: CPT

## 2024-06-12 PROCEDURE — 96402 CHEMO HORMON ANTINEOPL SQ/IM: CPT

## 2024-06-12 PROCEDURE — 2500000004 HC RX 250 GENERAL PHARMACY W/ HCPCS (ALT 636 FOR OP/ED): Mod: JZ,JG | Performed by: INTERNAL MEDICINE

## 2024-06-12 PROCEDURE — 84075 ASSAY ALKALINE PHOSPHATASE: CPT

## 2024-06-12 PROCEDURE — 85025 COMPLETE CBC W/AUTO DIFF WBC: CPT

## 2024-06-12 PROCEDURE — 83735 ASSAY OF MAGNESIUM: CPT

## 2024-06-12 RX ORDER — ALBUTEROL SULFATE 0.83 MG/ML
3 SOLUTION RESPIRATORY (INHALATION) AS NEEDED
OUTPATIENT
Start: 2024-09-03

## 2024-06-12 RX ORDER — GABAPENTIN 100 MG/1
100 CAPSULE ORAL NIGHTLY
Qty: 90 CAPSULE | Refills: 1 | Status: SHIPPED | OUTPATIENT
Start: 2024-06-12

## 2024-06-12 RX ORDER — FAMOTIDINE 10 MG/ML
20 INJECTION INTRAVENOUS ONCE AS NEEDED
OUTPATIENT
Start: 2024-09-03

## 2024-06-12 RX ORDER — FAMOTIDINE 10 MG/ML
20 INJECTION INTRAVENOUS ONCE AS NEEDED
OUTPATIENT
Start: 2024-08-07

## 2024-06-12 RX ORDER — EPINEPHRINE 0.3 MG/.3ML
0.3 INJECTION SUBCUTANEOUS EVERY 5 MIN PRN
OUTPATIENT
Start: 2024-07-10

## 2024-06-12 RX ORDER — DIPHENHYDRAMINE HYDROCHLORIDE 50 MG/ML
50 INJECTION INTRAMUSCULAR; INTRAVENOUS AS NEEDED
OUTPATIENT
Start: 2024-09-03

## 2024-06-12 RX ORDER — DIPHENHYDRAMINE HYDROCHLORIDE 50 MG/ML
50 INJECTION INTRAMUSCULAR; INTRAVENOUS AS NEEDED
OUTPATIENT
Start: 2024-07-10

## 2024-06-12 RX ORDER — ALBUTEROL SULFATE 0.83 MG/ML
3 SOLUTION RESPIRATORY (INHALATION) AS NEEDED
OUTPATIENT
Start: 2024-07-10

## 2024-06-12 RX ORDER — EPINEPHRINE 0.3 MG/.3ML
0.3 INJECTION SUBCUTANEOUS EVERY 5 MIN PRN
Status: DISCONTINUED | OUTPATIENT
Start: 2024-06-12 | End: 2024-06-12 | Stop reason: HOSPADM

## 2024-06-12 RX ORDER — DIPHENHYDRAMINE HYDROCHLORIDE 50 MG/ML
50 INJECTION INTRAMUSCULAR; INTRAVENOUS AS NEEDED
Status: DISCONTINUED | OUTPATIENT
Start: 2024-06-12 | End: 2024-06-12 | Stop reason: HOSPADM

## 2024-06-12 RX ORDER — DIPHENHYDRAMINE HYDROCHLORIDE 50 MG/ML
50 INJECTION INTRAMUSCULAR; INTRAVENOUS AS NEEDED
OUTPATIENT
Start: 2024-08-07

## 2024-06-12 RX ORDER — FAMOTIDINE 10 MG/ML
20 INJECTION INTRAVENOUS ONCE AS NEEDED
OUTPATIENT
Start: 2024-07-10

## 2024-06-12 RX ORDER — EPINEPHRINE 0.3 MG/.3ML
0.3 INJECTION SUBCUTANEOUS EVERY 5 MIN PRN
OUTPATIENT
Start: 2024-09-03

## 2024-06-12 RX ORDER — FAMOTIDINE 10 MG/ML
20 INJECTION INTRAVENOUS ONCE AS NEEDED
Status: DISCONTINUED | OUTPATIENT
Start: 2024-06-12 | End: 2024-06-12 | Stop reason: HOSPADM

## 2024-06-12 RX ORDER — EPINEPHRINE 0.3 MG/.3ML
0.3 INJECTION SUBCUTANEOUS EVERY 5 MIN PRN
OUTPATIENT
Start: 2024-08-07

## 2024-06-12 RX ORDER — LAMOTRIGINE 25 MG/1
500 TABLET ORAL ONCE
Status: COMPLETED | OUTPATIENT
Start: 2024-06-12 | End: 2024-06-12

## 2024-06-12 RX ORDER — ALBUTEROL SULFATE 0.83 MG/ML
3 SOLUTION RESPIRATORY (INHALATION) AS NEEDED
Status: DISCONTINUED | OUTPATIENT
Start: 2024-06-12 | End: 2024-06-12 | Stop reason: HOSPADM

## 2024-06-12 RX ORDER — ALBUTEROL SULFATE 0.83 MG/ML
3 SOLUTION RESPIRATORY (INHALATION) AS NEEDED
OUTPATIENT
Start: 2024-08-07

## 2024-06-12 ASSESSMENT — PAIN SCALES - GENERAL: PAINLEVEL: 0-NO PAIN

## 2024-06-12 NOTE — PROGRESS NOTES
" Patient ID: Santy Brody is a 73 y.o. female.  The patient presents to clinic today for her history of metastatic ER+/Her2- breast cancer.     Cancer Staging   Malignant neoplasm of female breast (Multi)  Staging form: Breast, AJCC 8th Edition  - Pathologic: Stage IV (cM1) - Unsigned    Diagnostic/Therapeutic History:  - She presented with breast cancer presented in 1991 with a right-sided breast lesion. She had a local recurrence in 1998 in the axilla measuring 3 cm.  Her-2/smith was positive as was hormone receptors. It was never clear if this was breast tissue or seamus tissue is her recurrence. Biopsies showed metastatic disease from the left iliac bone on February 12, 2013. It was consistent with breast carcinoma  with estrogen and progesterone receptors positive and HER-2/smith negative. It also appears that PITER-3 is positive as well.   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 A T-cell gene receptor arrangement study did suggest a clone, and a bone marrow biopsy was negative. Her staging studies were otherwise negative.   In the context of evaluating for metastatic disease, she underwent a PET scan where 2 areas in the colon. She has since undergone a colonoscopy, which was abnormal in 2 separate areas. Biopsy proven disease now exist in the proximal ascending colon with  a moderately differentiated adenocarcinoma as well as the sigmoid. KRAS assessment on the tumor has not been done. She has now had a definitive resection.  ERBB2 G, Missense\" class=\"variant-details ellipsis\" _[id number]=\"\"L869R, 2606T>G, Missense   PIK3CA A, Missense\" class=\"variant-details ellipsis\" _dypwcsudd-nek-s874=\"\"E545K, 1633G>A, Missense   DNMT3A K468fs*1, 1402_1415delAAGGAGATTATTGA, Frameshift   TET2 K3299kw*19, 3353delA, Frameshift.    Treatment History:    1991-0-0: Cancer Related Surgery: Right mastectomy and axillary node evaluation in 1991 1991-0-0: " Disease Assessment- Breast: Initial right-sided breast cancer diagnosed in 1991, the pathology of which, I have not been able to obtain. In 1998 she had  right axillary recurrence with 3 cm worth of tumor noted.  It was unclear if margins were  obtained or if was axiallry only tissue  1991-0-0: Chemotherapy: Six cycles of Adriamycin based chemotherapy as adjuvant      treatment in 1991.     1998-0-0: Radiation Therapy: Right axillary versus right upper quadrant base re-occurrence,      status post resection and right chest wall radiation in 1998.     1998-0-0: Cancer Related Surgery: 1998 Right axillary recurrence-3cm  2002-0-0: Hormonal Therapy: Tamoxifen was taken from 1998 to 2002 2002-7-1: New Treatment Plan: Anastrazole 2002-2008 2006-0-0: Radiation Therapy: Localized  XRT to RUE after resection of Tcell NHL  2006-12-0: Cancer Related Surgery: RUE subcutaneous mass c/w peripheral T cell NHL  2014-2-7: Disease Assessment-colon: AFter PET+ findings a colonoscopy revealed dz in 2 separate areas.  Biopsy proven  disease now exist in the proximal ascending colon with a moderately  differentiated adenocarcinoma as well as the sigmoid.  KRAS   has not been done.     2014-2-12: First Relapse Date-systemic: left iliac bone on February 12, 2013.  It was  consistent with breast carcinoma with estrogen and progesterone  receptors positive and HER-2/smith negative.  2014-3-20: Chemotherapy: Capecitabine 50 mg b.i.d. status post 2 cycles, her last March 20, 2014  2014-3-28: New Treatment Plan: Abraxane 260 mg/m2  2014-7-28: New Follow-up Plan: Abraxane stopped.  Exemestane initiated  2014-9-4: Cancer Related Surgery: R hemicolectomy  2016-2-19: New Treatment Plan: Exemestane stopped/ letrozole initiated  2016-3-4: Miscellaneous: Palbociclib initiated  2022-1-5: New Treatment Plan: Letrozole/Ibrance stopped with new bone disease noted  2022-1-20: Chemotherapy: fulvestrant start  2022-2-3: Chemotherapy: Abemiciclib  "start      History of Present Illness (HPI)/Interval History:  Ms. Brody presents today for routine FUV.  Denies sinus pain, fever/chills, sore throat.  Complaint 50 mg BID dose of Verzenio, very rarely forgets.   She notes having a sensation of her \"heart beat in my throat.\"  Prior back pain has improved. Avoiding heavy lifting, as that seems to aggravate pain.  No dyspnea, abdominal pain/cramping, diarrhea.   Expecting another grandson in Aug, [facility] her son and her daughter-in-law.     Review of Systems:  14-point ROS otherwise negative, as per HPI.    Past Medical History:   Diagnosis Date    Abnormal finding of blood chemistry, unspecified 10/30/2018    Abnormal blood chemistry    Abnormal findings on diagnostic imaging of other specified body structures 01/10/2014    Abnormal finding on imaging    Acute upper respiratory infection, unspecified 05/21/2019    Upper respiratory infection, viral    Bilateral foot pain     Body mass index (BMI) 29.0-29.9, adult 08/18/2022    BMI 29.0-29.9,adult    Body mass index (BMI) 29.0-29.9, adult 02/17/2022    BMI 29.0-29.9,adult    Body mass index (BMI)30.0-30.9, adult 09/14/2021    BMI 30.0-30.9,adult    Encounter for immunization     COVID-19 vaccine administered    Encounter for screening for malignant neoplasm of colon 07/11/2022    Screen for colon cancer    Encounter for screening mammogram for malignant neoplasm of breast     Visit for screening mammogram    Hx antineoplastic chemo 1992    Localized edema 01/14/2020    Pedal edema    Malignant neoplasm of colon, unspecified (Multi) 06/23/2015    Adenocarcinoma of colon    Malignant neoplasm of unspecified site of unspecified female breast (Multi) 04/18/2021    Adenocarcinoma of breast    Other conditions influencing health status     DEXA Body Composition Study    Personal history of diseases of the skin and subcutaneous tissue 06/23/2016    History of acne    Personal history of diseases of the skin " and subcutaneous tissue 04/23/2015    History of impetigo    Personal history of irradiation 1992    Personal history of other diseases of the female genital tract     History of endometriosis    Personal history of other diseases of the nervous system and sense organs 09/14/2021    History of restless legs syndrome    Personal history of other diseases of the respiratory system 02/24/2016    History of paranasal sinus congestion    Personal history of other endocrine, nutritional and metabolic disease 12/17/2014    History of obesity    Personal history of other infectious and parasitic diseases 07/29/2015    History of herpes zoster    Personal history of other malignant neoplasm of large intestine 02/17/2022    History of other malignant neoplasm of large intestine    Personal history of other specified conditions 02/24/2016    History of postnasal drip    Personal history of other specified conditions 01/02/2014    History of fatigue    Personal history of other specified conditions 01/02/2014    History of shortness of breath    Stress fracture, unspecified femur, initial encounter for fracture 08/24/2017    Stress fracture of femoral shaft    Unspecified open wound, right lower leg, initial encounter 09/10/2019    Leg wound, right       Past Surgical History:   Procedure Laterality Date    COLECTOMY  12/19/2016    Partial Colectomy    COLONOSCOPY  06/26/2013    Complete Colonoscopy    MASTECTOMY  07/31/2013    Breast Surgery Mastectomy    OTHER SURGICAL HISTORY  07/31/2013    Oophorectomy - Bilateral (Removal Of Both Ovaries)    OTHER SURGICAL HISTORY  07/31/2013    Arm Excision Of Soft Tissue Tumor    OTHER SURGICAL HISTORY  07/31/2013    Breast Surgery Reconstruction    OTHER SURGICAL HISTORY  02/19/2020    Laparoscopic endometrioma fulguration       Social History     Socioeconomic History    Marital status:      Spouse name: Not on file    Number of children: Not on file    Years of education: Not  on file    Highest education level: Not on file   Occupational History    Not on file   Tobacco Use    Smoking status: Former     Current packs/day: 0.00     Types: Cigarettes     Quit date:      Years since quittin.4    Smokeless tobacco: Never   Vaping Use    Vaping status: Never Used   Substance and Sexual Activity    Alcohol use: Not Currently     Comment: socially    Drug use: Never    Sexual activity: Defer   Other Topics Concern    Not on file   Social History Narrative    Not on file     Social Determinants of Health     Financial Resource Strain: Not on file   Food Insecurity: Not on file   Transportation Needs: Not on file   Physical Activity: Not on file   Stress: Not on file   Social Connections: Not on file   Intimate Partner Violence: Not on file   Housing Stability: Not on file       Allergies   Allergen Reactions    Denosumab Unknown and Other     Xgeva: Adverse Reaction: Osteoporosis with fracture    Should avoid all Biphosphonates         Current Outpatient Medications:     abemaciclib (Verzenio) 50 mg tablet, Take 1 tablet (50 mg total) by mouth 2 times a day.  Swallow whole., Disp: 56 tablet, Rfl: 2    calcium carbonate-vitamin D3 600 mg-5 mcg (200 unit) tablet, Take 1 tablet by mouth 2 times a day., Disp: , Rfl:     cranberry extract 200 mg capsule, Take by mouth., Disp: , Rfl:     fenofibrate (Tricor) 54 mg tablet, Take 1 tablet (54 mg) by mouth once daily in the morning. Take before meals., Disp: 90 tablet, Rfl: 1    fluticasone (Flonase) 50 mcg/actuation nasal spray, Administer 2 sprays into each nostril once daily., Disp: , Rfl:     furosemide (Lasix) 20 mg tablet, TAKE ONE TABLET BY MOUTH EVERY DAY, Disp: 90 tablet, Rfl: 0    gabapentin (Neurontin) 100 mg capsule, Take 1 capsule (100 mg) by mouth once daily at bedtime., Disp: 90 capsule, Rfl: 1    lisinopril 10 mg tablet, Take 1 tablet (10 mg) by mouth once daily., Disp: 90 tablet, Rfl: 1    omega-3/dha/epa/fish oil (OMEGA-3 ORAL),  Take by mouth. Omega 3 EPA + DHA 1000 MG Oral Capsule; Take 2 capsules by mouth twice daily., Disp: , Rfl:     polyethylene glycol-electrolytes (Golytely) 420 gram solution, STARTING AT 6PM DRINK HALF OF THE BOTTLE, DRINK THE OTHER HALF 5 HRS BEFORE PROCEDURE TIME, Disp: 4000 mL, Rfl: 0    triamcinolone (Kenalog) 0.1 % cream, Apply 1 Film topically in the morning and 1 Film before bedtime., Disp: , Rfl:     TURMERIC ORAL, , Disp: , Rfl:   No current facility-administered medications for this visit.    Facility-Administered Medications Ordered in Other Visits:     albuterol 2.5 mg /3 mL (0.083 %) nebulizer solution 3 mL, 3 mL, nebulization, PRN, Rah Slater MD    dextrose 5 % in water (D5W) bolus, 500 mL, intravenous, PRN, Rah Slater MD    diphenhydrAMINE (BENADryl) injection 50 mg, 50 mg, intravenous, PRN, Rah Slater MD    EPINEPHrine (Epipen) injection syringe 0.3 mg, 0.3 mg, intramuscular, q5 min PRN, Rah Slater MD    famotidine PF (Pepcid) injection 20 mg, 20 mg, intravenous, Once PRN, Rah Slater MD    methylPREDNISolone sod succinate (SOLU-Medrol) 40 mg/mL injection 40 mg, 40 mg, intravenous, PRN, Rah Slater MD    sodium chloride 0.9 % bolus 500 mL, 500 mL, intravenous, PRN, Rah Slater MD     Objective    BSA: There is no height or weight on file to calculate BSA.  There were no vitals taken for this visit.    Performance Status:  The ECOG performance scale today is ECO- Restricted in physically strenuous activity.  Carries out light duty.    Physical Exam  Constitutional:       General: She is not in acute distress.     Appearance: Normal appearance. She is not ill-appearing, toxic-appearing or diaphoretic.   HENT:      Head: Normocephalic and atraumatic.   Eyes:      General: No scleral icterus.     Conjunctiva/sclera: Conjunctivae normal.   Cardiovascular:      Rate and Rhythm: Normal rate and regular rhythm.      Heart sounds: No murmur heard.  Pulmonary:      Effort:  Pulmonary effort is normal. No respiratory distress.      Breath sounds: No wheezing.   Musculoskeletal:         General: No swelling, tenderness or deformity.      Cervical back: Normal range of motion and neck supple. No rigidity or tenderness.      Comments: Mild lymphedema    Lymphadenopathy:      Cervical: No cervical adenopathy.   Skin:     General: Skin is warm and dry.      Coloration: Skin is not jaundiced.      Findings: No rash.   Neurological:      General: No focal deficit present.      Mental Status: She is alert and oriented to person, place, and time. Mental status is at baseline.   Psychiatric:         Mood and Affect: Mood normal.         Behavior: Behavior normal.         Thought Content: Thought content normal.         Judgment: Judgment normal.         Laboratory Data:  Lab Results   Component Value Date    WBC 2.9 (L) 06/12/2024    HGB 8.9 (L) 06/12/2024    HCT 27.7 (L) 06/12/2024    MCV 73 (L) 06/12/2024     06/12/2024    ANC 0.94 (L) 12/27/2023       Chemistry    Lab Results   Component Value Date/Time     (L) 05/15/2024 1234    K 4.0 05/15/2024 1234    CL 98 05/15/2024 1234    CO2 29 05/15/2024 1234    BUN 17 05/15/2024 1234    CREATININE 1.26 (H) 05/15/2024 1234    Lab Results   Component Value Date/Time    CALCIUM 9.4 05/15/2024 1234    ALKPHOS 42 05/15/2024 1234    AST 25 05/15/2024 1234    ALT 14 05/15/2024 1234    BILITOT 1.0 05/15/2024 1234           Lab Results   Component Value Date    IRON 173 (H) 05/15/2024    TIBC 414 05/15/2024    FERRITIN 1,279 (H) 05/15/2024       Radiology:  Colonoscopy Screening; High Risk Patient; hx of colon cancer  Table formatting from the original result was not included.  Impression  Healthy end-to-side ileocolonic anastomosis in the transverse colon  Normal.    Findings  Healthy end-to-side ileocolonic anastomosis in the transverse colon  All observed locations appeared normal.;    Recommendation   Repeat colonoscopy in 5 years      Indication  Personal history of colon cancer    Staff  Staff Role   Tera Mccurdy MD Proceduralist     Medications  midazolam (Versed) injection 4 mg   fentaNYL PF (Sublimaze) injection 100 mcg   (Totals for administrations occurring from 1131 to 1158 on 04/16/24)     Preprocedure  A history and physical has been performed, and patient medication   allergies have been reviewed. The patient's tolerance of previous   anesthesia has been reviewed. The risks and benefits of the procedure and   the sedation options and risks were discussed with the patient. All   questions were answered and informed consent obtained.    Details of the Procedure  The patient underwent moderate sedation, which was administered by a   sedation nurse and the procedural nurse. The patient's blood pressure,   ECG, ETCO2, heart rate, level of consciousness, oxygen and respirations   were monitored throughout the procedure. A digital rectal exam was   performed. A perianal exam was performed. The scope was introduced through   the anus and advanced to the site of the end-to-side ileocolonic   anastomosis. The quality of bowel preparation was evaluated using the   Easley Bowel Preparation Scale with scores of: right colon = 3, transverse   colon = 3, left colon = 3. The total BBPS score was 9. Bowel prep was   adequate. The patient experienced no blood loss. The procedure was not   difficult. The patient tolerated the procedure well. There were no   apparent adverse events.     Events  Procedure Events   Event Event Time     Specimens  No specimens collected    Procedure Location  [facility] [address]  [facility] [address] [facility] [place]  [address] [facility] [place] [address]    Referring Provider  Kellen Lamb PA-C  [address] [place] [address]    Procedure Provider  Tera Mccurdy MD        Assessment/Plan:  Santy Brody is a 73 y.o. female with a history of metastatic (lung and bone involvement) breast cancer, who presents today follow-up  evaluation on fulvestrant and abemaciclib.    1. Stage IV breast cancer: She has lung and bone involvement.  - Currently on fulvestrant and abemaciclib, tolerating well   - Continue abemaciclib 50 mg BID (dose-reduced for anemia).  - Grade 1 diarrhea: with 50 mg BID has not been an issue  - Grade 1 neuropathy in feet. Stable.  - Check CA27-29 at future visit.  - CT CAP and bone scan ordered for Sept   - Follow up with Dr. Estrada after scans      Genomics for liquid bx (Foundation 1) 1/2022.  1. PIK3A  2. ERBB2  3. MSI- undetermined  4. VUS -- BRCA2 and ESR     2. Iron overload, anemia:   Hemoglobin electrophoresis shows beta-thalassemia minor.  - Recheck ferritin today     3. Bone metastasis, osteopenia:  She is not a candidate for further bisphosphonate therapy.  - Compression fractures (osteoporotic?) noted in spine without active disease in those areas.  - No benefit to radiation in this case.  - Discussed use of Forteo as an option for bone health, but as this is a daily injection, she would like to avoid for now. If recurrent pain, could consider kyphoplasty.     4. Colon cancer:  No disease activity suspected. Most recent CEA is normal.  Colonoscopy 4/2024: negative for polyps     5. Lymphedema:  Stable. Encouraged compression sleeve use. Overall stable.     Kellen Lamb PA-C  Hematology and Medical Oncology  [facility]

## 2024-07-10 ENCOUNTER — INFUSION (OUTPATIENT)
Dept: HEMATOLOGY/ONCOLOGY | Facility: CLINIC | Age: 74
End: 2024-07-10
Payer: COMMERCIAL

## 2024-07-10 VITALS
SYSTOLIC BLOOD PRESSURE: 148 MMHG | HEART RATE: 88 BPM | BODY MASS INDEX: 31.15 KG/M2 | TEMPERATURE: 96.8 F | OXYGEN SATURATION: 97 % | WEIGHT: 161.82 LBS | RESPIRATION RATE: 19 BRPM | DIASTOLIC BLOOD PRESSURE: 92 MMHG

## 2024-07-10 DIAGNOSIS — C50.919 MALIGNANT NEOPLASM OF FEMALE BREAST, UNSPECIFIED ESTROGEN RECEPTOR STATUS, UNSPECIFIED LATERALITY, UNSPECIFIED SITE OF BREAST (MULTI): ICD-10-CM

## 2024-07-10 PROCEDURE — 2500000004 HC RX 250 GENERAL PHARMACY W/ HCPCS (ALT 636 FOR OP/ED): Mod: JZ,JG

## 2024-07-10 PROCEDURE — 96402 CHEMO HORMON ANTINEOPL SQ/IM: CPT

## 2024-07-10 RX ORDER — EPINEPHRINE 0.3 MG/.3ML
0.3 INJECTION SUBCUTANEOUS EVERY 5 MIN PRN
Status: DISCONTINUED | OUTPATIENT
Start: 2024-07-10 | End: 2024-07-10 | Stop reason: HOSPADM

## 2024-07-10 RX ORDER — LAMOTRIGINE 25 MG/1
500 TABLET ORAL ONCE
Status: COMPLETED | OUTPATIENT
Start: 2024-07-10 | End: 2024-07-10

## 2024-07-10 RX ORDER — DIPHENHYDRAMINE HYDROCHLORIDE 50 MG/ML
50 INJECTION INTRAMUSCULAR; INTRAVENOUS AS NEEDED
Status: DISCONTINUED | OUTPATIENT
Start: 2024-07-10 | End: 2024-07-10 | Stop reason: HOSPADM

## 2024-07-10 RX ORDER — ALBUTEROL SULFATE 0.83 MG/ML
3 SOLUTION RESPIRATORY (INHALATION) AS NEEDED
Status: DISCONTINUED | OUTPATIENT
Start: 2024-07-10 | End: 2024-07-10 | Stop reason: HOSPADM

## 2024-07-10 RX ORDER — FAMOTIDINE 10 MG/ML
20 INJECTION INTRAVENOUS ONCE AS NEEDED
Status: DISCONTINUED | OUTPATIENT
Start: 2024-07-10 | End: 2024-07-10 | Stop reason: HOSPADM

## 2024-07-10 ASSESSMENT — PAIN SCALES - GENERAL: PAINLEVEL: 1

## 2024-08-02 ENCOUNTER — OFFICE VISIT (OUTPATIENT)
Dept: PRIMARY CARE | Facility: CLINIC | Age: 74
End: 2024-08-02
Payer: COMMERCIAL

## 2024-08-02 VITALS
OXYGEN SATURATION: 98 % | HEIGHT: 61 IN | DIASTOLIC BLOOD PRESSURE: 68 MMHG | WEIGHT: 162.4 LBS | SYSTOLIC BLOOD PRESSURE: 122 MMHG | BODY MASS INDEX: 30.66 KG/M2 | HEART RATE: 81 BPM

## 2024-08-02 DIAGNOSIS — M81.0 OSTEOPOROSIS, UNSPECIFIED OSTEOPOROSIS TYPE, UNSPECIFIED PATHOLOGICAL FRACTURE PRESENCE: ICD-10-CM

## 2024-08-02 DIAGNOSIS — I10 BENIGN ESSENTIAL HYPERTENSION: ICD-10-CM

## 2024-08-02 DIAGNOSIS — M79.18 MUSCULOSKELETAL PAIN: ICD-10-CM

## 2024-08-02 DIAGNOSIS — E78.5 HYPERLIPIDEMIA, UNSPECIFIED HYPERLIPIDEMIA TYPE: ICD-10-CM

## 2024-08-02 DIAGNOSIS — Z76.89 ENCOUNTER TO ESTABLISH CARE: Primary | ICD-10-CM

## 2024-08-02 DIAGNOSIS — Z85.3 HISTORY OF ADENOCARCINOMA OF BREAST: ICD-10-CM

## 2024-08-02 DIAGNOSIS — M79.672 PAIN IN BOTH FEET: ICD-10-CM

## 2024-08-02 DIAGNOSIS — M79.671 PAIN IN BOTH FEET: ICD-10-CM

## 2024-08-02 DIAGNOSIS — I87.2 CHRONIC STASIS DERMATITIS: ICD-10-CM

## 2024-08-02 DIAGNOSIS — I89.0 LYMPHEDEMA OF RIGHT ARM: ICD-10-CM

## 2024-08-02 PROCEDURE — 1036F TOBACCO NON-USER: CPT | Performed by: STUDENT IN AN ORGANIZED HEALTH CARE EDUCATION/TRAINING PROGRAM

## 2024-08-02 PROCEDURE — 1126F AMNT PAIN NOTED NONE PRSNT: CPT | Performed by: STUDENT IN AN ORGANIZED HEALTH CARE EDUCATION/TRAINING PROGRAM

## 2024-08-02 PROCEDURE — 3078F DIAST BP <80 MM HG: CPT | Performed by: STUDENT IN AN ORGANIZED HEALTH CARE EDUCATION/TRAINING PROGRAM

## 2024-08-02 PROCEDURE — 1158F ADVNC CARE PLAN TLK DOCD: CPT | Performed by: STUDENT IN AN ORGANIZED HEALTH CARE EDUCATION/TRAINING PROGRAM

## 2024-08-02 PROCEDURE — 3074F SYST BP LT 130 MM HG: CPT | Performed by: STUDENT IN AN ORGANIZED HEALTH CARE EDUCATION/TRAINING PROGRAM

## 2024-08-02 PROCEDURE — 1159F MED LIST DOCD IN RCRD: CPT | Performed by: STUDENT IN AN ORGANIZED HEALTH CARE EDUCATION/TRAINING PROGRAM

## 2024-08-02 PROCEDURE — 3008F BODY MASS INDEX DOCD: CPT | Performed by: STUDENT IN AN ORGANIZED HEALTH CARE EDUCATION/TRAINING PROGRAM

## 2024-08-02 PROCEDURE — 99204 OFFICE O/P NEW MOD 45 MIN: CPT | Performed by: STUDENT IN AN ORGANIZED HEALTH CARE EDUCATION/TRAINING PROGRAM

## 2024-08-02 PROCEDURE — 1123F ACP DISCUSS/DSCN MKR DOCD: CPT | Performed by: STUDENT IN AN ORGANIZED HEALTH CARE EDUCATION/TRAINING PROGRAM

## 2024-08-02 RX ORDER — FUROSEMIDE 20 MG/1
20 TABLET ORAL DAILY
Qty: 90 TABLET | Refills: 0 | Status: SHIPPED | OUTPATIENT
Start: 2024-08-02 | End: 2024-08-02

## 2024-08-02 RX ORDER — LISINOPRIL 10 MG/1
10 TABLET ORAL DAILY
Qty: 90 TABLET | Refills: 1 | Status: SHIPPED | OUTPATIENT
Start: 2024-08-02

## 2024-08-02 RX ORDER — FUROSEMIDE 20 MG/1
20 TABLET ORAL DAILY
Qty: 90 TABLET | Refills: 1 | Status: SHIPPED | OUTPATIENT
Start: 2024-08-02

## 2024-08-02 RX ORDER — FENOFIBRATE 54 MG/1
54 TABLET ORAL
Qty: 90 TABLET | Refills: 1 | Status: SHIPPED | OUTPATIENT
Start: 2024-08-02

## 2024-08-02 RX ORDER — GABAPENTIN 100 MG/1
100 CAPSULE ORAL NIGHTLY
Qty: 90 CAPSULE | Refills: 1 | Status: SHIPPED | OUTPATIENT
Start: 2024-08-02

## 2024-08-02 ASSESSMENT — ENCOUNTER SYMPTOMS
OCCASIONAL FEELINGS OF UNSTEADINESS: 0
LOSS OF SENSATION IN FEET: 0
DEPRESSION: 0

## 2024-08-02 ASSESSMENT — PAIN SCALES - GENERAL: PAINLEVEL: 0-NO PAIN

## 2024-08-02 NOTE — PROGRESS NOTES
Subjective   Patient ID: Santy Brody is a 74 y.o. female who presents for Establish Care and Med Refill (Furosemide, gabapentin, fenofibrate, lisinopril/90 days for all).    HPI  73 yo female here to establish care  Est care  HTN  On lisinopril 10 mg, furosemide daily  3. Breast cancer in R breast  Diagnosed in 1991  Came back as colon cancer in 2014  On Verzenio daily  Fulvestrant injections every 4 weeks  Follow oncologist  4. Neuropathy in fingers and feet/RLS  Takes gabapentin 100 mg nightly  5. Hypertriglyceridemia  Takes fenofibrate 54 mg daily  6. Osteoporosis  Taking calcium, vitamin D  Hx of R femur Fx  2016  Has brittle bones due to cancer treatments  Has bone scan coming up    Review of Systems  All pertinent positive symptoms are included in the history of present illness.    All other systems have been reviewed and are negative and noncontributory to this patient's current ailments.     Allergies   Allergen Reactions    Denosumab Unknown and Other     Xgeva: Adverse Reaction: Osteoporosis with fracture    Should avoid all Biphosphonates        Immunization History   Administered Date(s) Administered    Influenza, Seasonal, Quadrivalent, Adjuvanted 11/16/2022    Influenza, Unspecified 12/30/2023    Influenza, seasonal, injectable 12/01/2020    Influenza, seasonal, injectable, preservative free 10/28/2015, 09/29/2016    Moderna COVID-19 vaccine, Fall 2023, 12 yeasrs and older (50mcg/0.5mL) 11/05/2023    Moderna COVID-19 vaccine, bivalent, blue cap/gray label *Check age/dose* 09/28/2022    Moderna SARS-CoV-2 Vaccination 02/17/2021, 03/17/2021, 10/26/2021, 04/01/2022    Pneumococcal polysaccharide vaccine, 23-valent, age 2 years and older (PNEUMOVAX 23) 11/01/2010, 12/01/2020    Pneumococcal, Unspecified 11/30/2017    RESPIRATORY SYNCYTIAL VIRUS (RSV), ELIGIBLE PREGNANT PTS, 0.5 ML (ABRYSVO) 09/13/2023    Yellow Fever 07/29/2015    Zoster vaccine, recombinant, adult (SHINGRIX) 04/19/2023, 10/15/2023  "      Objective   Vitals:    08/02/24 1300   BP: 122/68   Pulse: 81   SpO2: 98%   Weight: 73.7 kg (162 lb 6.4 oz)   Height: 1.549 m (5' 1\")       Physical Exam  CONSTITUTIONAL - well nourished, well developed, looks like stated age, in no acute distress  SKIN - normal skin color and pigmentation. No rash, lesions, or nodules visualized  HEAD - no trauma, normocephalic  EYES - extraocular muscles are intact  ENT - atraumatic  NECK - no neck mass was observed  LUNGS - CTA B/L  CARDIAC - regular rate and regular rhythm  ABDOMEN - no organomegaly, soft, nontender, nondistended  EXTREMITIES - compression sleeve on R arm  MSK - moves limbs equally  NEUROLOGICAL - alert, oriented and no focal signs  PSYCHIATRIC - alert, pleasant and cordial, age-appropriate  IMMUNOLOGIC - no cervical lymphadenopathy     Assessment/Plan   75 yo female here to establish care  1. Est care  PMHx reviewed  2. HTN  Continue lisinopril 10 mg, furosemide daily  3. Breast cancer in R breast  Continue Verzenio daily  Fulvestrant injections every 4 weeks  Continue following oncologist  4. Neuropathy in fingers and feet/RLS  Continue gabapentin 100 mg nightly  5. Hypertriglyceridemia  Continue fenofibrate 54 mg daily  6. Osteoporosis  Continue taking calcium, vitamin D    RTC in 6 months for HM  "

## 2024-08-05 RX ORDER — HEPARIN SODIUM,PORCINE/PF 10 UNIT/ML
50 SYRINGE (ML) INTRAVENOUS AS NEEDED
OUTPATIENT
Start: 2024-08-05

## 2024-08-05 RX ORDER — FAMOTIDINE 10 MG/ML
20 INJECTION INTRAVENOUS ONCE AS NEEDED
Status: CANCELLED | OUTPATIENT
Start: 2024-08-07

## 2024-08-05 RX ORDER — PROCHLORPERAZINE EDISYLATE 5 MG/ML
10 INJECTION INTRAMUSCULAR; INTRAVENOUS EVERY 6 HOURS PRN
Status: CANCELLED | OUTPATIENT
Start: 2024-08-07

## 2024-08-05 RX ORDER — PROCHLORPERAZINE MALEATE 10 MG
10 TABLET ORAL EVERY 6 HOURS PRN
Status: CANCELLED | OUTPATIENT
Start: 2024-08-07

## 2024-08-05 RX ORDER — EPINEPHRINE 0.3 MG/.3ML
0.3 INJECTION SUBCUTANEOUS EVERY 5 MIN PRN
Status: CANCELLED | OUTPATIENT
Start: 2024-08-07

## 2024-08-05 RX ORDER — HEPARIN 100 UNIT/ML
500 SYRINGE INTRAVENOUS AS NEEDED
OUTPATIENT
Start: 2024-08-05

## 2024-08-05 RX ORDER — DIPHENHYDRAMINE HYDROCHLORIDE 50 MG/ML
50 INJECTION INTRAMUSCULAR; INTRAVENOUS AS NEEDED
Status: CANCELLED | OUTPATIENT
Start: 2024-08-07

## 2024-08-05 RX ORDER — ALBUTEROL SULFATE 0.83 MG/ML
3 SOLUTION RESPIRATORY (INHALATION) AS NEEDED
Status: CANCELLED | OUTPATIENT
Start: 2024-08-07

## 2024-08-07 ENCOUNTER — INFUSION (OUTPATIENT)
Dept: HEMATOLOGY/ONCOLOGY | Facility: CLINIC | Age: 74
End: 2024-08-07
Payer: COMMERCIAL

## 2024-08-07 VITALS
HEART RATE: 89 BPM | TEMPERATURE: 95.2 F | DIASTOLIC BLOOD PRESSURE: 89 MMHG | BODY MASS INDEX: 30.66 KG/M2 | OXYGEN SATURATION: 94 % | SYSTOLIC BLOOD PRESSURE: 148 MMHG | RESPIRATION RATE: 16 BRPM | WEIGHT: 162.26 LBS

## 2024-08-07 DIAGNOSIS — C50.919 MALIGNANT NEOPLASM OF FEMALE BREAST, UNSPECIFIED ESTROGEN RECEPTOR STATUS, UNSPECIFIED LATERALITY, UNSPECIFIED SITE OF BREAST (MULTI): ICD-10-CM

## 2024-08-07 PROCEDURE — 2500000004 HC RX 250 GENERAL PHARMACY W/ HCPCS (ALT 636 FOR OP/ED): Mod: JZ,JG

## 2024-08-07 PROCEDURE — 96402 CHEMO HORMON ANTINEOPL SQ/IM: CPT

## 2024-08-07 RX ORDER — ALBUTEROL SULFATE 0.83 MG/ML
3 SOLUTION RESPIRATORY (INHALATION) AS NEEDED
Status: DISCONTINUED | OUTPATIENT
Start: 2024-08-07 | End: 2024-08-07 | Stop reason: HOSPADM

## 2024-08-07 RX ORDER — EPINEPHRINE 0.3 MG/.3ML
0.3 INJECTION SUBCUTANEOUS EVERY 5 MIN PRN
Status: DISCONTINUED | OUTPATIENT
Start: 2024-08-07 | End: 2024-08-07 | Stop reason: HOSPADM

## 2024-08-07 RX ORDER — LAMOTRIGINE 25 MG/1
500 TABLET ORAL ONCE
Status: COMPLETED | OUTPATIENT
Start: 2024-08-07 | End: 2024-08-07

## 2024-08-07 RX ORDER — PROCHLORPERAZINE MALEATE 10 MG
10 TABLET ORAL EVERY 6 HOURS PRN
Status: DISCONTINUED | OUTPATIENT
Start: 2024-08-07 | End: 2024-08-07 | Stop reason: HOSPADM

## 2024-08-07 RX ORDER — FAMOTIDINE 10 MG/ML
20 INJECTION INTRAVENOUS ONCE AS NEEDED
Status: DISCONTINUED | OUTPATIENT
Start: 2024-08-07 | End: 2024-08-07 | Stop reason: HOSPADM

## 2024-08-07 RX ORDER — PROCHLORPERAZINE EDISYLATE 5 MG/ML
10 INJECTION INTRAMUSCULAR; INTRAVENOUS EVERY 6 HOURS PRN
Status: DISCONTINUED | OUTPATIENT
Start: 2024-08-07 | End: 2024-08-07 | Stop reason: HOSPADM

## 2024-08-07 RX ORDER — DIPHENHYDRAMINE HYDROCHLORIDE 50 MG/ML
50 INJECTION INTRAMUSCULAR; INTRAVENOUS AS NEEDED
Status: DISCONTINUED | OUTPATIENT
Start: 2024-08-07 | End: 2024-08-07 | Stop reason: HOSPADM

## 2024-08-07 ASSESSMENT — PAIN SCALES - GENERAL: PAINLEVEL: 0-NO PAIN

## 2024-08-20 DIAGNOSIS — C50.919 MALIGNANT NEOPLASM OF UNSPECIFIED SITE OF UNSPECIFIED FEMALE BREAST (MULTI): Primary | ICD-10-CM

## 2024-08-26 ENCOUNTER — LAB (OUTPATIENT)
Dept: LAB | Facility: LAB | Age: 74
End: 2024-08-26
Payer: COMMERCIAL

## 2024-08-26 DIAGNOSIS — E78.5 HYPERLIPIDEMIA, UNSPECIFIED HYPERLIPIDEMIA TYPE: ICD-10-CM

## 2024-08-26 DIAGNOSIS — M81.0 OSTEOPOROSIS, UNSPECIFIED OSTEOPOROSIS TYPE, UNSPECIFIED PATHOLOGICAL FRACTURE PRESENCE: ICD-10-CM

## 2024-08-26 DIAGNOSIS — C50.919 MALIGNANT NEOPLASM OF UNSPECIFIED SITE OF UNSPECIFIED FEMALE BREAST (MULTI): ICD-10-CM

## 2024-08-26 LAB
25(OH)D3 SERPL-MCNC: 26 NG/ML (ref 31–100)
CHOLEST SERPL-MCNC: 189 MG/DL (ref 133–200)
CHOLEST/HDLC SERPL: 3 {RATIO}
CREAT SERPL-MCNC: 1.3 MG/DL (ref 0.4–1.6)
EGFRCR SERPLBLD CKD-EPI 2021: 44 ML/MIN/1.73M*2
HDLC SERPL-MCNC: 63 MG/DL
LDLC SERPL CALC-MCNC: 89 MG/DL (ref 65–130)
PTH-INTACT SERPL-MCNC: 38.7 PG/ML (ref 18.5–88)
TRIGL SERPL-MCNC: 186 MG/DL (ref 40–150)

## 2024-08-26 PROCEDURE — 82565 ASSAY OF CREATININE: CPT

## 2024-08-26 PROCEDURE — 36415 COLL VENOUS BLD VENIPUNCTURE: CPT

## 2024-08-26 PROCEDURE — 80061 LIPID PANEL: CPT

## 2024-08-26 PROCEDURE — 82306 VITAMIN D 25 HYDROXY: CPT

## 2024-08-26 PROCEDURE — 83970 ASSAY OF PARATHORMONE: CPT

## 2024-08-28 ENCOUNTER — HOSPITAL ENCOUNTER (OUTPATIENT)
Dept: RADIOLOGY | Facility: HOSPITAL | Age: 74
Discharge: HOME | End: 2024-08-28
Payer: COMMERCIAL

## 2024-08-28 DIAGNOSIS — C79.51 CARCINOMA OF BREAST METASTATIC TO BONE, UNSPECIFIED LATERALITY (MULTI): ICD-10-CM

## 2024-08-28 DIAGNOSIS — C50.919 MALIGNANT NEOPLASM OF FEMALE BREAST, UNSPECIFIED ESTROGEN RECEPTOR STATUS, UNSPECIFIED LATERALITY, UNSPECIFIED SITE OF BREAST (MULTI): ICD-10-CM

## 2024-08-28 DIAGNOSIS — C50.919 CARCINOMA OF BREAST METASTATIC TO BONE, UNSPECIFIED LATERALITY (MULTI): ICD-10-CM

## 2024-08-28 PROCEDURE — 74177 CT ABD & PELVIS W/CONTRAST: CPT | Performed by: STUDENT IN AN ORGANIZED HEALTH CARE EDUCATION/TRAINING PROGRAM

## 2024-08-28 PROCEDURE — A9503 TC99M MEDRONATE: HCPCS

## 2024-08-28 PROCEDURE — 78306 BONE IMAGING WHOLE BODY: CPT

## 2024-08-28 PROCEDURE — 3430000001 HC RX 343 DIAGNOSTIC RADIOPHARMACEUTICALS

## 2024-08-28 PROCEDURE — 71260 CT THORAX DX C+: CPT | Performed by: STUDENT IN AN ORGANIZED HEALTH CARE EDUCATION/TRAINING PROGRAM

## 2024-08-28 PROCEDURE — 2550000001 HC RX 255 CONTRASTS

## 2024-08-28 PROCEDURE — 74177 CT ABD & PELVIS W/CONTRAST: CPT

## 2024-08-30 ENCOUNTER — TELEPHONE (OUTPATIENT)
Dept: HEMATOLOGY/ONCOLOGY | Facility: HOSPITAL | Age: 74
End: 2024-08-30
Payer: COMMERCIAL

## 2024-08-30 DIAGNOSIS — C50.919 MALIGNANT NEOPLASM OF BREAST IN FEMALE, ESTROGEN RECEPTOR POSITIVE, UNSPECIFIED LATERALITY, UNSPECIFIED SITE OF BREAST: ICD-10-CM

## 2024-08-30 DIAGNOSIS — Z17.0 MALIGNANT NEOPLASM OF BREAST IN FEMALE, ESTROGEN RECEPTOR POSITIVE, UNSPECIFIED LATERALITY, UNSPECIFIED SITE OF BREAST: ICD-10-CM

## 2024-08-30 NOTE — TELEPHONE ENCOUNTER
VM left for pt regarding stable CT and NM bone scan as reviewed with Dr Estrada. Pt will be new to establish with Dr Estrada next week. Instructed to call the dept with questions

## 2024-08-30 NOTE — TELEPHONE ENCOUNTER
----- Message from Vanna Estrada sent at 8/30/2024 11:56 AM EDT -----  Thank you I will review on Tuesday. Ok to tell patient about results by phone if she wants.  ----- Message -----  From: Taylor Palm, APRN-CNP  Sent: 8/30/2024   9:09 AM EDT  To: Vanna Estrada MD    Hi Vanna,    Attached is restaging scans- Bryon transfer seeing you to establish on 9/3. Read is stable.   Covering Kellen Lamb's inbox and wanted to re route to you.     Taylor Palm MSN, APRN, FNP-C  [facility]  Division of Medical Oncology- Breast   Collaborating Physician Dr. Gregor Maciel   [facility] Veronika Castañeda, Philly [place] [address]  Phone: [phone]  Fax: [phone]  ----- Message -----  From: Interface, Radiology Results In  Sent: 8/29/2024   1:34 PM EDT  To: Kellen Lamb PA-C

## 2024-09-03 ENCOUNTER — INFUSION (OUTPATIENT)
Dept: HEMATOLOGY/ONCOLOGY | Facility: CLINIC | Age: 74
End: 2024-09-03
Payer: COMMERCIAL

## 2024-09-03 ENCOUNTER — TELEPHONE (OUTPATIENT)
Dept: PRIMARY CARE | Facility: CLINIC | Age: 74
End: 2024-09-03

## 2024-09-03 ENCOUNTER — OFFICE VISIT (OUTPATIENT)
Dept: HEMATOLOGY/ONCOLOGY | Facility: CLINIC | Age: 74
End: 2024-09-03
Payer: COMMERCIAL

## 2024-09-03 VITALS
WEIGHT: 165.9 LBS | RESPIRATION RATE: 16 BRPM | HEART RATE: 76 BPM | OXYGEN SATURATION: 97 % | HEIGHT: 60 IN | SYSTOLIC BLOOD PRESSURE: 185 MMHG | BODY MASS INDEX: 32.57 KG/M2 | TEMPERATURE: 96.4 F | DIASTOLIC BLOOD PRESSURE: 93 MMHG

## 2024-09-03 DIAGNOSIS — C50.919 CARCINOMA OF BREAST METASTATIC TO BONE, UNSPECIFIED LATERALITY (MULTI): Primary | ICD-10-CM

## 2024-09-03 DIAGNOSIS — D56.3 BETA THALASSEMIA MINOR: ICD-10-CM

## 2024-09-03 DIAGNOSIS — C50.919 MALIGNANT NEOPLASM OF FEMALE BREAST, UNSPECIFIED ESTROGEN RECEPTOR STATUS, UNSPECIFIED LATERALITY, UNSPECIFIED SITE OF BREAST (MULTI): ICD-10-CM

## 2024-09-03 DIAGNOSIS — C79.51 CARCINOMA OF BREAST METASTATIC TO BONE, UNSPECIFIED LATERALITY (MULTI): Primary | ICD-10-CM

## 2024-09-03 LAB
ALBUMIN SERPL BCP-MCNC: 4.3 G/DL (ref 3.4–5)
ALP SERPL-CCNC: 45 U/L (ref 33–136)
ALT SERPL W P-5'-P-CCNC: 17 U/L (ref 7–45)
ANION GAP SERPL CALC-SCNC: 13 MMOL/L (ref 10–20)
AST SERPL W P-5'-P-CCNC: 27 U/L (ref 9–39)
BASOPHILS # BLD AUTO: 0.03 X10*3/UL (ref 0–0.1)
BASOPHILS NFR BLD AUTO: 1.2 %
BILIRUB SERPL-MCNC: 1 MG/DL (ref 0–1.2)
BUN SERPL-MCNC: 16 MG/DL (ref 6–23)
CALCIUM SERPL-MCNC: 9.4 MG/DL (ref 8.6–10.3)
CANCER AG27-29 SERPL-ACNC: 118.3 U/ML (ref 0–38.6)
CHLORIDE SERPL-SCNC: 100 MMOL/L (ref 98–107)
CO2 SERPL-SCNC: 28 MMOL/L (ref 21–32)
CREAT SERPL-MCNC: 1.03 MG/DL (ref 0.5–1.05)
EGFRCR SERPLBLD CKD-EPI 2021: 58 ML/MIN/1.73M*2
EOSINOPHIL # BLD AUTO: 0.03 X10*3/UL (ref 0–0.4)
EOSINOPHIL NFR BLD AUTO: 1.2 %
ERYTHROCYTE [DISTWIDTH] IN BLOOD BY AUTOMATED COUNT: 16.5 % (ref 11.5–14.5)
FERRITIN SERPL-MCNC: 1291 NG/ML (ref 8–150)
GLUCOSE SERPL-MCNC: 102 MG/DL (ref 74–99)
HCT VFR BLD AUTO: 29.3 % (ref 36–46)
HGB BLD-MCNC: 9.1 G/DL (ref 12–16)
IMM GRANULOCYTES # BLD AUTO: 0.03 X10*3/UL (ref 0–0.5)
IMM GRANULOCYTES NFR BLD AUTO: 1.2 % (ref 0–0.9)
LDH SERPL L TO P-CCNC: 222 U/L (ref 84–246)
LYMPHOCYTES # BLD AUTO: 0.91 X10*3/UL (ref 0.8–3)
LYMPHOCYTES NFR BLD AUTO: 36.1 %
MCH RBC QN AUTO: 23 PG (ref 26–34)
MCHC RBC AUTO-ENTMCNC: 31.1 G/DL (ref 32–36)
MCV RBC AUTO: 74 FL (ref 80–100)
MONOCYTES # BLD AUTO: 0.23 X10*3/UL (ref 0.05–0.8)
MONOCYTES NFR BLD AUTO: 9.1 %
NEUTROPHILS # BLD AUTO: 1.29 X10*3/UL (ref 1.6–5.5)
NEUTROPHILS NFR BLD AUTO: 51.2 %
NRBC BLD-RTO: 1.6 /100 WBCS (ref 0–0)
PLATELET # BLD AUTO: 206 X10*3/UL (ref 150–450)
POTASSIUM SERPL-SCNC: 3.8 MMOL/L (ref 3.5–5.3)
PROT SERPL-MCNC: 7.8 G/DL (ref 6.4–8.2)
RBC # BLD AUTO: 3.95 X10*6/UL (ref 4–5.2)
SODIUM SERPL-SCNC: 137 MMOL/L (ref 136–145)
WBC # BLD AUTO: 2.5 X10*3/UL (ref 4.4–11.3)

## 2024-09-03 PROCEDURE — 3008F BODY MASS INDEX DOCD: CPT | Performed by: STUDENT IN AN ORGANIZED HEALTH CARE EDUCATION/TRAINING PROGRAM

## 2024-09-03 PROCEDURE — 82728 ASSAY OF FERRITIN: CPT | Mod: WESLAB | Performed by: STUDENT IN AN ORGANIZED HEALTH CARE EDUCATION/TRAINING PROGRAM

## 2024-09-03 PROCEDURE — 86300 IMMUNOASSAY TUMOR CA 15-3: CPT | Mod: WESLAB | Performed by: STUDENT IN AN ORGANIZED HEALTH CARE EDUCATION/TRAINING PROGRAM

## 2024-09-03 PROCEDURE — 2500000004 HC RX 250 GENERAL PHARMACY W/ HCPCS (ALT 636 FOR OP/ED): Mod: JZ,JG | Performed by: STUDENT IN AN ORGANIZED HEALTH CARE EDUCATION/TRAINING PROGRAM

## 2024-09-03 PROCEDURE — 3077F SYST BP >= 140 MM HG: CPT | Performed by: STUDENT IN AN ORGANIZED HEALTH CARE EDUCATION/TRAINING PROGRAM

## 2024-09-03 PROCEDURE — 1123F ACP DISCUSS/DSCN MKR DOCD: CPT | Performed by: STUDENT IN AN ORGANIZED HEALTH CARE EDUCATION/TRAINING PROGRAM

## 2024-09-03 PROCEDURE — 99215 OFFICE O/P EST HI 40 MIN: CPT | Performed by: STUDENT IN AN ORGANIZED HEALTH CARE EDUCATION/TRAINING PROGRAM

## 2024-09-03 PROCEDURE — 80053 COMPREHEN METABOLIC PANEL: CPT | Performed by: STUDENT IN AN ORGANIZED HEALTH CARE EDUCATION/TRAINING PROGRAM

## 2024-09-03 PROCEDURE — 85025 COMPLETE CBC W/AUTO DIFF WBC: CPT | Performed by: STUDENT IN AN ORGANIZED HEALTH CARE EDUCATION/TRAINING PROGRAM

## 2024-09-03 PROCEDURE — 96402 CHEMO HORMON ANTINEOPL SQ/IM: CPT

## 2024-09-03 PROCEDURE — 3080F DIAST BP >= 90 MM HG: CPT | Performed by: STUDENT IN AN ORGANIZED HEALTH CARE EDUCATION/TRAINING PROGRAM

## 2024-09-03 PROCEDURE — 83615 LACTATE (LD) (LDH) ENZYME: CPT | Mod: WESLAB | Performed by: STUDENT IN AN ORGANIZED HEALTH CARE EDUCATION/TRAINING PROGRAM

## 2024-09-03 RX ORDER — PROCHLORPERAZINE EDISYLATE 5 MG/ML
10 INJECTION INTRAMUSCULAR; INTRAVENOUS EVERY 6 HOURS PRN
OUTPATIENT
Start: 2024-10-01

## 2024-09-03 RX ORDER — PROCHLORPERAZINE MALEATE 10 MG
10 TABLET ORAL EVERY 6 HOURS PRN
Status: DISCONTINUED | OUTPATIENT
Start: 2024-09-03 | End: 2024-09-03 | Stop reason: HOSPADM

## 2024-09-03 RX ORDER — PROCHLORPERAZINE EDISYLATE 5 MG/ML
10 INJECTION INTRAMUSCULAR; INTRAVENOUS EVERY 6 HOURS PRN
Status: DISCONTINUED | OUTPATIENT
Start: 2024-09-03 | End: 2024-09-03 | Stop reason: HOSPADM

## 2024-09-03 RX ORDER — PROCHLORPERAZINE EDISYLATE 5 MG/ML
10 INJECTION INTRAMUSCULAR; INTRAVENOUS EVERY 6 HOURS PRN
OUTPATIENT
Start: 2024-11-26

## 2024-09-03 RX ORDER — FAMOTIDINE 10 MG/ML
20 INJECTION INTRAVENOUS ONCE AS NEEDED
Status: DISCONTINUED | OUTPATIENT
Start: 2024-09-03 | End: 2024-09-03 | Stop reason: HOSPADM

## 2024-09-03 RX ORDER — PROCHLORPERAZINE MALEATE 10 MG
10 TABLET ORAL EVERY 6 HOURS PRN
OUTPATIENT
Start: 2024-10-01

## 2024-09-03 RX ORDER — FAMOTIDINE 10 MG/ML
20 INJECTION INTRAVENOUS ONCE AS NEEDED
Status: CANCELLED | OUTPATIENT
Start: 2024-09-03

## 2024-09-03 RX ORDER — EPINEPHRINE 0.3 MG/.3ML
0.3 INJECTION SUBCUTANEOUS EVERY 5 MIN PRN
OUTPATIENT
Start: 2024-11-26

## 2024-09-03 RX ORDER — LAMOTRIGINE 25 MG/1
500 TABLET ORAL ONCE
OUTPATIENT
Start: 2024-10-01

## 2024-09-03 RX ORDER — PROCHLORPERAZINE EDISYLATE 5 MG/ML
10 INJECTION INTRAMUSCULAR; INTRAVENOUS EVERY 6 HOURS PRN
Status: CANCELLED | OUTPATIENT
Start: 2024-09-03

## 2024-09-03 RX ORDER — DIPHENHYDRAMINE HYDROCHLORIDE 50 MG/ML
50 INJECTION INTRAMUSCULAR; INTRAVENOUS AS NEEDED
OUTPATIENT
Start: 2024-10-01

## 2024-09-03 RX ORDER — LAMOTRIGINE 25 MG/1
500 TABLET ORAL ONCE
OUTPATIENT
Start: 2024-10-29

## 2024-09-03 RX ORDER — DIPHENHYDRAMINE HYDROCHLORIDE 50 MG/ML
50 INJECTION INTRAMUSCULAR; INTRAVENOUS AS NEEDED
OUTPATIENT
Start: 2024-11-26

## 2024-09-03 RX ORDER — LAMOTRIGINE 25 MG/1
500 TABLET ORAL ONCE
Status: COMPLETED | OUTPATIENT
Start: 2024-09-03 | End: 2024-09-03

## 2024-09-03 RX ORDER — EPINEPHRINE 0.3 MG/.3ML
0.3 INJECTION SUBCUTANEOUS EVERY 5 MIN PRN
Status: CANCELLED | OUTPATIENT
Start: 2024-09-03

## 2024-09-03 RX ORDER — EPINEPHRINE 0.3 MG/.3ML
0.3 INJECTION SUBCUTANEOUS EVERY 5 MIN PRN
OUTPATIENT
Start: 2024-10-29

## 2024-09-03 RX ORDER — ALBUTEROL SULFATE 0.83 MG/ML
3 SOLUTION RESPIRATORY (INHALATION) AS NEEDED
OUTPATIENT
Start: 2024-10-29

## 2024-09-03 RX ORDER — DIPHENHYDRAMINE HYDROCHLORIDE 50 MG/ML
50 INJECTION INTRAMUSCULAR; INTRAVENOUS AS NEEDED
Status: DISCONTINUED | OUTPATIENT
Start: 2024-09-03 | End: 2024-09-03 | Stop reason: HOSPADM

## 2024-09-03 RX ORDER — ALBUTEROL SULFATE 0.83 MG/ML
3 SOLUTION RESPIRATORY (INHALATION) AS NEEDED
OUTPATIENT
Start: 2024-11-26

## 2024-09-03 RX ORDER — LAMOTRIGINE 25 MG/1
500 TABLET ORAL ONCE
Status: CANCELLED | OUTPATIENT
Start: 2024-09-03

## 2024-09-03 RX ORDER — DIPHENHYDRAMINE HYDROCHLORIDE 50 MG/ML
50 INJECTION INTRAMUSCULAR; INTRAVENOUS AS NEEDED
OUTPATIENT
Start: 2024-10-29

## 2024-09-03 RX ORDER — PROCHLORPERAZINE MALEATE 10 MG
10 TABLET ORAL EVERY 6 HOURS PRN
OUTPATIENT
Start: 2024-11-26

## 2024-09-03 RX ORDER — PROCHLORPERAZINE MALEATE 10 MG
10 TABLET ORAL EVERY 6 HOURS PRN
OUTPATIENT
Start: 2024-10-29

## 2024-09-03 RX ORDER — FAMOTIDINE 10 MG/ML
20 INJECTION INTRAVENOUS ONCE AS NEEDED
OUTPATIENT
Start: 2024-10-29

## 2024-09-03 RX ORDER — LAMOTRIGINE 25 MG/1
500 TABLET ORAL ONCE
OUTPATIENT
Start: 2024-11-26

## 2024-09-03 RX ORDER — ALBUTEROL SULFATE 0.83 MG/ML
3 SOLUTION RESPIRATORY (INHALATION) AS NEEDED
Status: DISCONTINUED | OUTPATIENT
Start: 2024-09-03 | End: 2024-09-03 | Stop reason: HOSPADM

## 2024-09-03 RX ORDER — FAMOTIDINE 10 MG/ML
20 INJECTION INTRAVENOUS ONCE AS NEEDED
OUTPATIENT
Start: 2024-11-26

## 2024-09-03 RX ORDER — FAMOTIDINE 10 MG/ML
20 INJECTION INTRAVENOUS ONCE AS NEEDED
OUTPATIENT
Start: 2024-10-01

## 2024-09-03 RX ORDER — ALBUTEROL SULFATE 0.83 MG/ML
3 SOLUTION RESPIRATORY (INHALATION) AS NEEDED
Status: CANCELLED | OUTPATIENT
Start: 2024-09-03

## 2024-09-03 RX ORDER — PROCHLORPERAZINE MALEATE 10 MG
10 TABLET ORAL EVERY 6 HOURS PRN
Status: CANCELLED | OUTPATIENT
Start: 2024-09-03

## 2024-09-03 RX ORDER — ALBUTEROL SULFATE 0.83 MG/ML
3 SOLUTION RESPIRATORY (INHALATION) AS NEEDED
OUTPATIENT
Start: 2024-10-01

## 2024-09-03 RX ORDER — PROCHLORPERAZINE EDISYLATE 5 MG/ML
10 INJECTION INTRAMUSCULAR; INTRAVENOUS EVERY 6 HOURS PRN
OUTPATIENT
Start: 2024-10-29

## 2024-09-03 RX ORDER — EPINEPHRINE 0.3 MG/.3ML
0.3 INJECTION SUBCUTANEOUS EVERY 5 MIN PRN
Status: DISCONTINUED | OUTPATIENT
Start: 2024-09-03 | End: 2024-09-03 | Stop reason: HOSPADM

## 2024-09-03 RX ORDER — EPINEPHRINE 0.3 MG/.3ML
0.3 INJECTION SUBCUTANEOUS EVERY 5 MIN PRN
OUTPATIENT
Start: 2024-10-01

## 2024-09-03 RX ORDER — DIPHENHYDRAMINE HYDROCHLORIDE 50 MG/ML
50 INJECTION INTRAMUSCULAR; INTRAVENOUS AS NEEDED
Status: CANCELLED | OUTPATIENT
Start: 2024-09-03

## 2024-09-03 NOTE — PROGRESS NOTES
" Patient ID: Santy Brody is a 74 y.o. female.  The patient presents to clinic today for her history of metastatic ER+/Her2- breast cancer.     Cancer Staging   Malignant neoplasm of female breast (Multi)  Staging form: Breast, AJCC 8th Edition  - Pathologic: Stage IV (cM1) - Unsigned    Diagnostic/Therapeutic History:  - She presented with breast cancer presented in 1991 with a right-sided breast lesion. She had a local recurrence in 1998 in the axilla measuring 3 cm.  Her-2/smith was positive as was hormone receptors. It was never clear if this was breast tissue or seamus tissue is her recurrence. Biopsies showed metastatic disease from the left iliac bone on February 12, 2013. It was consistent with breast carcinoma  with estrogen and progesterone receptors positive and HER-2/smith negative. It also appears that PITER-3 is positive as well.   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 A T-cell gene receptor arrangement study did suggest a clone, and a bone marrow biopsy was negative. Her staging studies were otherwise negative.   In the context of evaluating for metastatic disease, she underwent a PET scan where 2 areas in the colon. She has since undergone a colonoscopy, which was abnormal in 2 separate areas. Biopsy proven disease now exist in the proximal ascending colon with  a moderately differentiated adenocarcinoma as well as the sigmoid. KRAS assessment on the tumor has not been done. She has now had a definitive resection.  ERBB2 G, Missense\" class=\"variant-details ellipsis\" _[id number]=\"\"L869R, 2606T>G, Missense   PIK3CA A, Missense\" class=\"variant-details ellipsis\" _[id number]-[id number]=\"\"E545K, 1633G>A, Missense   DNMT3A K468fs*1, 1402_1415delAAGGAGATTATTGA, Frameshift   TET2 J9968vl*19, 3353delA, Frameshift.    Treatment History:    1991-0-0: Cancer Related Surgery: Right mastectomy and axillary node evaluation in 1991 1991-0-0: " Disease Assessment- Breast: Initial right-sided breast cancer diagnosed in 1991, the pathology of which, I have not been able to obtain. In 1998 she had  right axillary recurrence with 3 cm worth of tumor noted.  It was unclear if margins were  obtained or if was axiallry only tissue  1991-0-0: Chemotherapy: Six cycles of Adriamycin based chemotherapy as adjuvant      treatment in 1991.     1998-0-0: Radiation Therapy: Right axillary versus right upper quadrant base re-occurrence,      status post resection and right chest wall radiation in 1998.     1998-0-0: Cancer Related Surgery: 1998 Right axillary recurrence-3cm  2002-0-0: Hormonal Therapy: Tamoxifen was taken from 1998 to 2002 2002-7-1: New Treatment Plan: Anastrazole 2002-2008 2006-0-0: Radiation Therapy: Localized  XRT to RUE after resection of Tcell NHL  2006-12-0: Cancer Related Surgery: RUE subcutaneous mass c/w peripheral T cell NHL  2014-2-7: Disease Assessment-colon: AFter PET+ findings a colonoscopy revealed dz in 2 separate areas.  Biopsy proven  disease now exist in the proximal ascending colon with a moderately  differentiated adenocarcinoma as well as the sigmoid.  KRAS   has not been done.     2014-2-12: First Relapse Date-systemic: left iliac bone on February 12, 2013.  It was  consistent with breast carcinoma with estrogen and progesterone  receptors positive and HER-2/smith negative.  2014-3-20: Chemotherapy: Capecitabine 50 mg b.i.d. status post 2 cycles, her last March 20, 2014  2014-3-28: New Treatment Plan: Abraxane 260 mg/m2  2014-7-28: New Follow-up Plan: Abraxane stopped.  Exemestane initiated  2014-9-4: Cancer Related Surgery: R hemicolectomy  2016-2-19: New Treatment Plan: Exemestane stopped/ letrozole initiated  2016-3-4: Miscellaneous: Palbociclib initiated  2022-1-5: New Treatment Plan: Letrozole/Ibrance stopped with new bone disease noted  2022-1-20: Chemotherapy: fulvestrant start  2022-2-3: Chemotherapy: Abemiciclib  "start    History of Present Illness (HPI)/Interval History:  Ms. Brody presents today for routine FUV.  Denies sinus pain, fever/chills, sore throat.  Compliant with 50 mg BID dose of Verzenio, very rarely forgets.   She notes having a sensation of her \"heart beat in my throat.\"  Prior back pain has improved. Avoiding heavy lifting, as that seems to aggravate pain.  No dyspnea, abdominal pain/cramping, diarrhea.   Just came back from [place] where she was visiting her son and new baby grandson.    Forgot to take her blood pressure medications for a few days while in [place].    Review of Systems:  14-point ROS otherwise negative, as per HPI.    I have personally reviewed PMH, PSH, Family History in the HISTORY tab of this chart, and unless noted above are not pertinent to the presenting complaint.  I have personally reviewed Social History as provided in the electronic medical record.    Allergies   Allergen Reactions    Denosumab Unknown and Other     Xgeva: Adverse Reaction: Osteoporosis with fracture    Should avoid all Biphosphonates       Current Outpatient Medications   Medication Instructions    calcium carbonate-vitamin D3 600 mg-5 mcg (200 unit) tablet 1 tablet, oral, 2 times daily    cranberry extract 200 mg capsule oral    fenofibrate (TRICOR) 54 mg, oral, Daily before breakfast    fluticasone (Flonase) 50 mcg/actuation nasal spray 2 sprays, Each Nostril, Daily    furosemide (LASIX) 20 mg, oral, Daily    gabapentin (NEURONTIN) 100 mg, oral, Nightly    lisinopril 10 mg, oral, Daily    omega-3/dha/epa/fish oil (OMEGA-3 ORAL) oral, Omega 3 EPA + DHA 1000 MG Oral Capsule; Take 2 capsules by mouth twice daily.    TURMERIC ORAL No dose, route, or frequency recorded.         Objective    BSA: 1.79 meters squared  BP (!) 185/93 (BP Location: Left arm, Patient Position: Sitting, BP Cuff Size: Adult long)   Pulse 76   Temp 35.8 °C (96.4 °F) (Temporal)   Resp 16   Ht (S) 1.524 m (5')   Wt 75.3 kg (165 lb " 14.3 oz)   SpO2 97%   BMI 32.40 kg/m²     Performance Status:  (0) Fully active, able to carry on all predisease performance without restriction     General: Pleasant woman, appears stated age, well-groomed in street clothes,  awake/alert/oriented x3, no distress, alert and cooperative  Head: Short hair fully covering scalp. Symmetric facial expressions  Eyes: PERRL, EOMI, clear sclera, eyebrows present.  Ears/Nose/Mouth/Throat:  Oral mucous membranes moist. No oral ulcers. No palpable pre/post-auricular lymph nodes  Neck: No palpable cervical chain lymph nodes  Respiratory: unlabored breathing on room air, good air movement in all lung field  Breast: deferred per patient preference.  Cardio: Regular rate and rhythm, normal S1 and S2, radial pulses symmetric  GI: Nondistended, soft, non-tender abdomen  Musculoskeletal: Normal muscle bulk and tone, ROM intact, no joint swelling.  Rises from chair and walks unassisted.  Extremities: No ankle swelling, no arm or leg wounds, no clubbing on nails  Neuro: Alert, cognition intact, speech normal. Facial expressions symmetric.  No motor deficits noted. Sensation intact to touch and hot/cold.   Able to stand from seated position unassisted and walks around the room unassisted.  Psychological: Appropriate mood and behavior.  Skin: Warm and dry, no lesions, no rashes      Laboratory Data:  Lab Results   Component Value Date    WBC 2.9 (L) 06/12/2024    HGB 8.9 (L) 06/12/2024    HCT 27.7 (L) 06/12/2024    MCV 73 (L) 06/12/2024     06/12/2024    ANC 0.94 (L) 12/27/2023       Chemistry    Lab Results   Component Value Date/Time     (L) 06/12/2024 1556    K 3.6 06/12/2024 1556    CL 96 (L) 06/12/2024 1556    CO2 29 06/12/2024 1556    BUN 21 06/12/2024 1556    CREATININE 1.30 08/26/2024 1038    Lab Results   Component Value Date/Time    CALCIUM 9.2 06/12/2024 1556    ALKPHOS 59 06/12/2024 1556    AST 27 06/12/2024 1556    ALT 15 06/12/2024 1556    BILITOT 0.8  06/12/2024 1556           Lab Results   Component Value Date    IRON 173 (H) 05/15/2024    TIBC 414 05/15/2024    FERRITIN 1,168 (H) 06/12/2024     Radiology:  I personally reviewed the images with patient. Radiology report reviewed and listed below for reference.    8/28/24 NM bone whole body  IMPRESSION: 1. No focal increased radiotracer uptake involving right sacral ala lower lumbar vertebrae, consistent with treated metastatic disease.  2. No new foci of radiotracer uptake to suggest new bone metastasis.  3. Interval decrease in radiotracer uptake involving L2 vertebral  body, as compared to prior bone scan dated 09/05/2023, corresponding to a compression fracture on CT.  4. Contiguous foci of radiotracer uptake involving left 4th, 5th and  6th ribs anteriorly, likely due to recent fractures.    8/28/24 CT chest abdomen pelvis w IV contrast  Impression: CHEST:  1. Status post right breast surgery/reconstruction with no definite  soft tissue masses in the chest wall.  2. No enlarged intrathoracic lymphadenopathy.  3. Stable pulmonary findings with a scattered bilateral (right-sided  predominance) areas of scarring and atelectasis. Stable predominantly  right-sided nodular density/ill-defined pulmonary nodules which did  not show FDG uptake in prior PET scan and possibly  inflammatory/atelectasis, would warrant continued attention on  follow-up. No new suspicious pulmonary nodules or masses.  4. Cardiomegaly. Severe coronary artery calcifications.      ABDOMEN-PELVIS:  1. Status post right hemicolectomy with ileocolonic anastomosis. No  complications.  2. No metastatic disease in the abdomen or pelvis.      Osseous structures:      1.   Stable superior endplate compression deformity of T12 and L2  vertebral bodies. Old left 4th-6th rib anterior fractures. Please  refer to same day bone scan official read for further details.       Assessment/Plan:  Santy Brody is a 74 y.o. female with a history of metastatic  (lung and bone involvement) breast cancer, who presents today follow-up evaluation on fulvestrant and abemaciclib.    1. Stage IV breast cancer: She has lung and bone involvement.  - Currently on fulvestrant and abemaciclib, tolerating well   - Continue abemaciclib 50 mg BID (dose-reduced for anemia).  - Grade 1 diarrhea: with 50 mg BID has not been an issue  - Grade 1 neuropathy in feet. Stable.  - Check CA27-29 today 9/3  - Next CT CAP and bone scan in 4 months (Jan 2024)     Genomics for liquid bx (Foundation 1) 1/2022.  1. PIK3A  2. ERBB2  3. MSI- undetermined  4. VUS -- BRCA2 and ESR     2. 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  Hemoglobin baseline 8-9  ---Secondary iron overload. Goal Ferritin 1000-1,500  Ferritin 1,168 on 6/12/24  - Recheck ferritin every 3-6 months for monitoring    3. Bone metastasis, osteopenia:  She is not a candidate for further bisphosphonate therapy.  - Compression fractures (osteoporotic?) noted in spine without active disease in those areas.  - No benefit to radiation in this case.  - Discussed use of Forteo as an option for bone health, but as this is a daily injection, she would like to avoid for now. If recurrent pain, could consider kyphoplasty.     4. Colon cancer:  No disease activity suspected. Most recent CEA is normal.  Colonoscopy 4/2024: negative for polyps     5. Lymphedema:  Stable. Encouraged compression sleeve use. Overall stable.    Peripheral T Cell Lymphoma  Enlarged lymph node, biopsy taken 12/19/2005   -check CBC, LDH, check for adenopathy on scans     Next MD visit in 3 months (end of Nov or December)    Vanna Estrada MD  Medical Oncology  [facility]

## 2024-09-12 ENCOUNTER — APPOINTMENT (OUTPATIENT)
Dept: PRIMARY CARE | Facility: CLINIC | Age: 74
End: 2024-09-12
Payer: COMMERCIAL

## 2024-09-13 ENCOUNTER — APPOINTMENT (OUTPATIENT)
Dept: PRIMARY CARE | Facility: CLINIC | Age: 74
End: 2024-09-13
Payer: COMMERCIAL

## 2024-09-17 DIAGNOSIS — M79.18 MUSCULOSKELETAL PAIN: ICD-10-CM

## 2024-09-17 RX ORDER — GABAPENTIN 100 MG/1
100 CAPSULE ORAL NIGHTLY
Qty: 90 CAPSULE | Refills: 1 | Status: SHIPPED | OUTPATIENT
Start: 2024-09-17

## 2024-09-19 ENCOUNTER — TELEPHONE (OUTPATIENT)
Dept: ADMISSION | Facility: HOSPITAL | Age: 74
End: 2024-09-19
Payer: COMMERCIAL

## 2024-09-19 NOTE — TELEPHONE ENCOUNTER
Message left for pt on identified VM requesting callback to verify if she needs verzenio refilled.   Contact info left.

## 2024-09-24 ENCOUNTER — APPOINTMENT (OUTPATIENT)
Dept: OPHTHALMOLOGY | Facility: CLINIC | Age: 74
End: 2024-09-24
Payer: COMMERCIAL

## 2024-09-24 DIAGNOSIS — H35.372 EPIRETINAL MEMBRANE (ERM) OF LEFT EYE: Primary | ICD-10-CM

## 2024-09-24 DIAGNOSIS — H26.493 BILATERAL POSTERIOR CAPSULAR OPACIFICATION: ICD-10-CM

## 2024-09-24 DIAGNOSIS — Z96.1 PSEUDOPHAKIA: ICD-10-CM

## 2024-09-24 DIAGNOSIS — H53.9 VISUAL DISTURBANCE: ICD-10-CM

## 2024-09-24 PROCEDURE — 99214 OFFICE O/P EST MOD 30 MIN: CPT | Performed by: OPHTHALMOLOGY

## 2024-09-24 PROCEDURE — 92134 CPTRZ OPH DX IMG PST SGM RTA: CPT | Mod: BILATERAL PROCEDURE | Performed by: OPHTHALMOLOGY

## 2024-09-24 ASSESSMENT — REFRACTION_MANIFEST
OD_AXIS: 095
OS_AXIS: 100
OD_CYLINDER: +2.75
OS_CYLINDER: +2.75
OS_ADD: +2.75
OD_SPHERE: -1.75
OS_SPHERE: -3.25
OD_ADD: +2.75

## 2024-09-24 ASSESSMENT — EXTERNAL EXAM - LEFT EYE: OS_EXAM: NORMAL

## 2024-09-24 ASSESSMENT — ENCOUNTER SYMPTOMS
ENDOCRINE NEGATIVE: 0
GASTROINTESTINAL NEGATIVE: 0
RESPIRATORY NEGATIVE: 0
CONSTITUTIONAL NEGATIVE: 0
HEMATOLOGIC/LYMPHATIC NEGATIVE: 0
EYES NEGATIVE: 1
PSYCHIATRIC NEGATIVE: 0
MUSCULOSKELETAL NEGATIVE: 0
NEUROLOGICAL NEGATIVE: 0
ALLERGIC/IMMUNOLOGIC NEGATIVE: 0
CARDIOVASCULAR NEGATIVE: 0

## 2024-09-24 ASSESSMENT — CONF VISUAL FIELD
OS_SUPERIOR_NASAL_RESTRICTION: 0
OD_SUPERIOR_TEMPORAL_RESTRICTION: 0
OD_INFERIOR_NASAL_RESTRICTION: 0
OS_INFERIOR_TEMPORAL_RESTRICTION: 0
OD_INFERIOR_TEMPORAL_RESTRICTION: 0
OD_NORMAL: 1
OS_NORMAL: 1
OD_SUPERIOR_NASAL_RESTRICTION: 0
OS_SUPERIOR_TEMPORAL_RESTRICTION: 0
OS_INFERIOR_NASAL_RESTRICTION: 0

## 2024-09-24 ASSESSMENT — VISUAL ACUITY
CORRECTION_TYPE: GLASSES
METHOD: SNELLEN - LINEAR
OS_CC: 20/20
OD_CC: 20/20-2

## 2024-09-24 ASSESSMENT — REFRACTION_WEARINGRX
OD_CYLINDER: -3.00
OD_AXIS: 005
OD_SPHERE: +1.50
OS_SPHERE: PLANO
OS_AXIS: 010
OS_CYLINDER: -3.00

## 2024-09-24 ASSESSMENT — TONOMETRY
OS_IOP_MMHG: 12
IOP_METHOD: GOLDMANN APPLANATION
OD_IOP_MMHG: 12

## 2024-09-24 ASSESSMENT — SLIT LAMP EXAM - LIDS
COMMENTS: GOOD POSITION
COMMENTS: GOOD POSITION

## 2024-09-24 ASSESSMENT — CUP TO DISC RATIO
OS_RATIO: .3
OD_RATIO: .1

## 2024-09-24 ASSESSMENT — EXTERNAL EXAM - RIGHT EYE: OD_EXAM: NORMAL

## 2024-09-24 NOTE — PROGRESS NOTES
Pseudophakia, both eyes  With trace pco OU  Patietn not bothered  Monitor    Erm, left eye:  Preserved foveal contour    Visual disturbance, both eyes  Patient noticing fragmented vision in both eyes that starts inferiorly and goes superiorly over minutes until it resolves  States its like looking through jagged broken glass  Given new onset and both eyes at same time will refer to Dr. Chatterjee on whether neuro-imaging is necessary  
[Negative] : Genitourinary

## 2024-09-25 NOTE — TELEPHONE ENCOUNTER
Pt called back and confirmed she does need a refill of Verzenio.  Preferred pharmacy is [facility].

## 2024-09-27 NOTE — TELEPHONE ENCOUNTER
Patient calling to follow up, states she has about 8 tabs left of BID rx. [facility] states if they receive rx today they can send Monday.

## 2024-09-30 DIAGNOSIS — Z17.0 MALIGNANT NEOPLASM OF BREAST IN FEMALE, ESTROGEN RECEPTOR POSITIVE, UNSPECIFIED LATERALITY, UNSPECIFIED SITE OF BREAST: Primary | ICD-10-CM

## 2024-09-30 DIAGNOSIS — C50.919 MALIGNANT NEOPLASM OF BREAST IN FEMALE, ESTROGEN RECEPTOR POSITIVE, UNSPECIFIED LATERALITY, UNSPECIFIED SITE OF BREAST: Primary | ICD-10-CM

## 2024-09-30 NOTE — TELEPHONE ENCOUNTER
I called Santy and left V/M on cell the Verzenio RX was placed today to [facility]. If she has anymore questions call the nurse line back at [phone] option 5 then option 2.

## 2024-10-01 ENCOUNTER — INFUSION (OUTPATIENT)
Dept: HEMATOLOGY/ONCOLOGY | Facility: CLINIC | Age: 74
End: 2024-10-01
Payer: COMMERCIAL

## 2024-10-01 VITALS
WEIGHT: 165.01 LBS | DIASTOLIC BLOOD PRESSURE: 92 MMHG | BODY MASS INDEX: 32.23 KG/M2 | SYSTOLIC BLOOD PRESSURE: 142 MMHG | TEMPERATURE: 97.2 F | HEART RATE: 78 BPM | RESPIRATION RATE: 16 BRPM | OXYGEN SATURATION: 95 %

## 2024-10-01 DIAGNOSIS — C50.919 MALIGNANT NEOPLASM OF FEMALE BREAST, UNSPECIFIED ESTROGEN RECEPTOR STATUS, UNSPECIFIED LATERALITY, UNSPECIFIED SITE OF BREAST: ICD-10-CM

## 2024-10-01 PROCEDURE — 96402 CHEMO HORMON ANTINEOPL SQ/IM: CPT

## 2024-10-01 PROCEDURE — 2500000004 HC RX 250 GENERAL PHARMACY W/ HCPCS (ALT 636 FOR OP/ED): Mod: JZ,JG | Performed by: STUDENT IN AN ORGANIZED HEALTH CARE EDUCATION/TRAINING PROGRAM

## 2024-10-01 RX ORDER — ALBUTEROL SULFATE 0.83 MG/ML
3 SOLUTION RESPIRATORY (INHALATION) AS NEEDED
Status: DISCONTINUED | OUTPATIENT
Start: 2024-10-01 | End: 2024-10-01 | Stop reason: HOSPADM

## 2024-10-01 RX ORDER — EPINEPHRINE 0.3 MG/.3ML
0.3 INJECTION SUBCUTANEOUS EVERY 5 MIN PRN
Status: DISCONTINUED | OUTPATIENT
Start: 2024-10-01 | End: 2024-10-01 | Stop reason: HOSPADM

## 2024-10-01 RX ORDER — PROCHLORPERAZINE MALEATE 10 MG
10 TABLET ORAL EVERY 6 HOURS PRN
Status: DISCONTINUED | OUTPATIENT
Start: 2024-10-01 | End: 2024-10-01 | Stop reason: HOSPADM

## 2024-10-01 RX ORDER — LAMOTRIGINE 25 MG/1
500 TABLET ORAL ONCE
Status: COMPLETED | OUTPATIENT
Start: 2024-10-01 | End: 2024-10-01

## 2024-10-01 RX ORDER — PROCHLORPERAZINE EDISYLATE 5 MG/ML
10 INJECTION INTRAMUSCULAR; INTRAVENOUS EVERY 6 HOURS PRN
Status: DISCONTINUED | OUTPATIENT
Start: 2024-10-01 | End: 2024-10-01 | Stop reason: HOSPADM

## 2024-10-01 RX ORDER — FAMOTIDINE 10 MG/ML
20 INJECTION INTRAVENOUS ONCE AS NEEDED
Status: DISCONTINUED | OUTPATIENT
Start: 2024-10-01 | End: 2024-10-01 | Stop reason: HOSPADM

## 2024-10-01 RX ORDER — DIPHENHYDRAMINE HYDROCHLORIDE 50 MG/ML
50 INJECTION INTRAMUSCULAR; INTRAVENOUS AS NEEDED
Status: DISCONTINUED | OUTPATIENT
Start: 2024-10-01 | End: 2024-10-01 | Stop reason: HOSPADM

## 2024-10-01 ASSESSMENT — PAIN SCALES - GENERAL: PAINLEVEL: 0-NO PAIN

## 2024-10-29 ENCOUNTER — INFUSION (OUTPATIENT)
Dept: HEMATOLOGY/ONCOLOGY | Facility: CLINIC | Age: 74
End: 2024-10-29
Payer: COMMERCIAL

## 2024-10-29 VITALS
DIASTOLIC BLOOD PRESSURE: 87 MMHG | BODY MASS INDEX: 32.14 KG/M2 | TEMPERATURE: 97.5 F | HEART RATE: 102 BPM | SYSTOLIC BLOOD PRESSURE: 133 MMHG | RESPIRATION RATE: 17 BRPM | WEIGHT: 164.57 LBS | OXYGEN SATURATION: 95 %

## 2024-10-29 DIAGNOSIS — C50.919 MALIGNANT NEOPLASM OF FEMALE BREAST, UNSPECIFIED ESTROGEN RECEPTOR STATUS, UNSPECIFIED LATERALITY, UNSPECIFIED SITE OF BREAST: ICD-10-CM

## 2024-10-29 PROCEDURE — 96402 CHEMO HORMON ANTINEOPL SQ/IM: CPT

## 2024-10-29 PROCEDURE — 2500000004 HC RX 250 GENERAL PHARMACY W/ HCPCS (ALT 636 FOR OP/ED): Mod: JZ,JG | Performed by: STUDENT IN AN ORGANIZED HEALTH CARE EDUCATION/TRAINING PROGRAM

## 2024-10-29 RX ORDER — LAMOTRIGINE 25 MG/1
500 TABLET ORAL ONCE
Status: COMPLETED | OUTPATIENT
Start: 2024-10-29 | End: 2024-10-29

## 2024-10-29 RX ORDER — DIPHENHYDRAMINE HYDROCHLORIDE 50 MG/ML
50 INJECTION INTRAMUSCULAR; INTRAVENOUS AS NEEDED
Status: DISCONTINUED | OUTPATIENT
Start: 2024-10-29 | End: 2024-10-29 | Stop reason: HOSPADM

## 2024-10-29 RX ORDER — EPINEPHRINE 0.3 MG/.3ML
0.3 INJECTION SUBCUTANEOUS EVERY 5 MIN PRN
Status: DISCONTINUED | OUTPATIENT
Start: 2024-10-29 | End: 2024-10-29 | Stop reason: HOSPADM

## 2024-10-29 RX ORDER — PROCHLORPERAZINE MALEATE 10 MG
10 TABLET ORAL EVERY 6 HOURS PRN
Status: DISCONTINUED | OUTPATIENT
Start: 2024-10-29 | End: 2024-10-29 | Stop reason: HOSPADM

## 2024-10-29 RX ORDER — PROCHLORPERAZINE EDISYLATE 5 MG/ML
10 INJECTION INTRAMUSCULAR; INTRAVENOUS EVERY 6 HOURS PRN
Status: DISCONTINUED | OUTPATIENT
Start: 2024-10-29 | End: 2024-10-29 | Stop reason: HOSPADM

## 2024-10-29 RX ORDER — FAMOTIDINE 10 MG/ML
20 INJECTION INTRAVENOUS ONCE AS NEEDED
Status: DISCONTINUED | OUTPATIENT
Start: 2024-10-29 | End: 2024-10-29 | Stop reason: HOSPADM

## 2024-10-29 RX ORDER — ALBUTEROL SULFATE 0.83 MG/ML
3 SOLUTION RESPIRATORY (INHALATION) AS NEEDED
Status: DISCONTINUED | OUTPATIENT
Start: 2024-10-29 | End: 2024-10-29 | Stop reason: HOSPADM

## 2024-10-29 ASSESSMENT — PAIN SCALES - GENERAL: PAINLEVEL_OUTOF10: 0-NO PAIN

## 2024-11-26 ENCOUNTER — OFFICE VISIT (OUTPATIENT)
Dept: HEMATOLOGY/ONCOLOGY | Facility: CLINIC | Age: 74
End: 2024-11-26
Payer: COMMERCIAL

## 2024-11-26 ENCOUNTER — INFUSION (OUTPATIENT)
Dept: HEMATOLOGY/ONCOLOGY | Facility: CLINIC | Age: 74
End: 2024-11-26
Payer: COMMERCIAL

## 2024-11-26 VITALS
RESPIRATION RATE: 18 BRPM | DIASTOLIC BLOOD PRESSURE: 84 MMHG | BODY MASS INDEX: 32.21 KG/M2 | HEART RATE: 78 BPM | WEIGHT: 164.9 LBS | SYSTOLIC BLOOD PRESSURE: 143 MMHG | OXYGEN SATURATION: 97 % | TEMPERATURE: 97.2 F

## 2024-11-26 DIAGNOSIS — C79.51 CARCINOMA OF BREAST METASTATIC TO BONE, UNSPECIFIED LATERALITY (MULTI): ICD-10-CM

## 2024-11-26 DIAGNOSIS — C50.919 MALIGNANT NEOPLASM OF FEMALE BREAST, UNSPECIFIED ESTROGEN RECEPTOR STATUS, UNSPECIFIED LATERALITY, UNSPECIFIED SITE OF BREAST: ICD-10-CM

## 2024-11-26 DIAGNOSIS — C50.919 CARCINOMA OF BREAST METASTATIC TO BONE, UNSPECIFIED LATERALITY (MULTI): ICD-10-CM

## 2024-11-26 PROCEDURE — 3077F SYST BP >= 140 MM HG: CPT | Performed by: STUDENT IN AN ORGANIZED HEALTH CARE EDUCATION/TRAINING PROGRAM

## 2024-11-26 PROCEDURE — 99214 OFFICE O/P EST MOD 30 MIN: CPT | Performed by: STUDENT IN AN ORGANIZED HEALTH CARE EDUCATION/TRAINING PROGRAM

## 2024-11-26 PROCEDURE — 1159F MED LIST DOCD IN RCRD: CPT | Performed by: STUDENT IN AN ORGANIZED HEALTH CARE EDUCATION/TRAINING PROGRAM

## 2024-11-26 PROCEDURE — 1123F ACP DISCUSS/DSCN MKR DOCD: CPT | Performed by: STUDENT IN AN ORGANIZED HEALTH CARE EDUCATION/TRAINING PROGRAM

## 2024-11-26 PROCEDURE — 1125F AMNT PAIN NOTED PAIN PRSNT: CPT | Performed by: STUDENT IN AN ORGANIZED HEALTH CARE EDUCATION/TRAINING PROGRAM

## 2024-11-26 PROCEDURE — 3079F DIAST BP 80-89 MM HG: CPT | Performed by: STUDENT IN AN ORGANIZED HEALTH CARE EDUCATION/TRAINING PROGRAM

## 2024-11-26 PROCEDURE — 96402 CHEMO HORMON ANTINEOPL SQ/IM: CPT

## 2024-11-26 PROCEDURE — 1160F RVW MEDS BY RX/DR IN RCRD: CPT | Performed by: STUDENT IN AN ORGANIZED HEALTH CARE EDUCATION/TRAINING PROGRAM

## 2024-11-26 PROCEDURE — 2500000004 HC RX 250 GENERAL PHARMACY W/ HCPCS (ALT 636 FOR OP/ED): Mod: JZ,JG | Performed by: STUDENT IN AN ORGANIZED HEALTH CARE EDUCATION/TRAINING PROGRAM

## 2024-11-26 RX ORDER — ALBUTEROL SULFATE 0.83 MG/ML
3 SOLUTION RESPIRATORY (INHALATION) AS NEEDED
Status: DISCONTINUED | OUTPATIENT
Start: 2024-11-26 | End: 2024-11-26 | Stop reason: HOSPADM

## 2024-11-26 RX ORDER — DIPHENHYDRAMINE HYDROCHLORIDE 50 MG/ML
50 INJECTION INTRAMUSCULAR; INTRAVENOUS AS NEEDED
Status: DISCONTINUED | OUTPATIENT
Start: 2024-11-26 | End: 2024-11-26 | Stop reason: HOSPADM

## 2024-11-26 RX ORDER — FAMOTIDINE 10 MG/ML
20 INJECTION INTRAVENOUS ONCE AS NEEDED
Status: DISCONTINUED | OUTPATIENT
Start: 2024-11-26 | End: 2024-11-26 | Stop reason: HOSPADM

## 2024-11-26 RX ORDER — PROCHLORPERAZINE EDISYLATE 5 MG/ML
10 INJECTION INTRAMUSCULAR; INTRAVENOUS EVERY 6 HOURS PRN
Status: DISCONTINUED | OUTPATIENT
Start: 2024-11-26 | End: 2024-11-26 | Stop reason: HOSPADM

## 2024-11-26 RX ORDER — PROCHLORPERAZINE MALEATE 10 MG
10 TABLET ORAL EVERY 6 HOURS PRN
Status: DISCONTINUED | OUTPATIENT
Start: 2024-11-26 | End: 2024-11-26 | Stop reason: HOSPADM

## 2024-11-26 RX ORDER — LAMOTRIGINE 25 MG/1
500 TABLET ORAL ONCE
Status: COMPLETED | OUTPATIENT
Start: 2024-11-26 | End: 2024-11-26

## 2024-11-26 RX ORDER — EPINEPHRINE 0.3 MG/.3ML
0.3 INJECTION SUBCUTANEOUS EVERY 5 MIN PRN
Status: DISCONTINUED | OUTPATIENT
Start: 2024-11-26 | End: 2024-11-26 | Stop reason: HOSPADM

## 2024-11-26 ASSESSMENT — PAIN SCALES - GENERAL: PAINLEVEL_OUTOF10: 2

## 2024-11-26 NOTE — PROGRESS NOTES
Patient here for follow up with Dr. Estrada. Medications and allergies reviewed with patient.     Patient reports that she has some neuropathy. She has been tolerating the Verzenio well with no additional side effects  Pain: no  Nausea/vomiting: no  Diarrhea/constipation:  no  Difficulty eating: no  Weight loss: no    Per orders patient to get Fulvestrant injection today.   Patient to follow up with Kellen VASQUES in February 2025.      Patient verbalized understanding, no further questions at this time.

## 2024-11-26 NOTE — PROGRESS NOTES
" Patient ID: Santy Brody is a 74 y.o. female.  The patient presents to clinic today for her history of metastatic ER+/Her2- breast cancer.     Cancer Staging   Malignant neoplasm of female breast  Staging form: Breast, AJCC 8th Edition  - Pathologic: Stage IV (cM1) - Unsigned    Diagnostic/Therapeutic History:  - She presented with breast cancer presented in 1991 with a right-sided breast lesion. She had a local recurrence in 1998 in the axilla measuring 3 cm.  Her-2/smith was positive as was hormone receptors. It was never clear if this was breast tissue or seamus tissue is her recurrence. Biopsies showed metastatic disease from the left iliac bone on February 12, 2013. It was consistent with breast carcinoma  with estrogen and progesterone receptors positive and HER-2/smith negative. It also appears that PITER-3 is positive as well.   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 A T-cell gene receptor arrangement study did suggest a clone, and a bone marrow biopsy was negative. Her staging studies were otherwise negative.   In the context of evaluating for metastatic disease, she underwent a PET scan where 2 areas in the colon. She has since undergone a colonoscopy, which was abnormal in 2 separate areas. Biopsy proven disease now exist in the proximal ascending colon with  a moderately differentiated adenocarcinoma as well as the sigmoid. KRAS assessment on the tumor has not been done. She has now had a definitive resection.  ERBB2 G, Missense\" class=\"variant-details ellipsis\" _[id number]=\"\"L869R, 2606T>G, Missense   PIK3CA A, Missense\" class=\"variant-details ellipsis\" _[id number]=\"\"E545K, 1633G>A, Missense   DNMT3A K468fs*1, 1402_1415delAAGGAGATTATTGA, Frameshift   TET2 C4727qd*19, 3353delA, Frameshift.    Treatment History:    1991-0-0: Cancer Related Surgery: Right mastectomy and axillary node evaluation in 1991 1991-0-0: Disease " Assessment- Breast: Initial right-sided breast cancer diagnosed in 1991, the pathology of which, I have not been able to obtain. In 1998 she had  right axillary recurrence with 3 cm worth of tumor noted.  It was unclear if margins were  obtained or if was axiallry only tissue  1991-0-0: Chemotherapy: Six cycles of Adriamycin based chemotherapy as adjuvant      treatment in 1991.     1998-0-0: Radiation Therapy: Right axillary versus right upper quadrant base re-occurrence,      status post resection and right chest wall radiation in 1998.     1998-0-0: Cancer Related Surgery: 1998 Right axillary recurrence-3cm  2002-0-0: Hormonal Therapy: Tamoxifen was taken from 1998 to 2002 2002-7-1: New Treatment Plan: Anastrazole 2002-2008 2006-0-0: Radiation Therapy: Localized  XRT to RUE after resection of Tcell NHL  2006-12-0: Cancer Related Surgery: RUE subcutaneous mass c/w peripheral T cell NHL  2014-2-7: Disease Assessment-colon: AFter PET+ findings a colonoscopy revealed dz in 2 separate areas.  Biopsy proven  disease now exist in the proximal ascending colon with a moderately  differentiated adenocarcinoma as well as the sigmoid.  KRAS   has not been done.     2014-2-12: First Relapse Date-systemic: left iliac bone on February 12, 2013.  It was  consistent with breast carcinoma with estrogen and progesterone  receptors positive and HER-2/smith negative.  2014-3-20: Chemotherapy: Capecitabine 50 mg b.i.d. status post 2 cycles, her last March 20, 2014  2014-3-28: New Treatment Plan: Abraxane 260 mg/m2  2014-7-28: New Follow-up Plan: Abraxane stopped.  Exemestane initiated  2014-9-4: Cancer Related Surgery: R hemicolectomy  2016-2-19: New Treatment Plan: Exemestane stopped/ letrozole initiated  2016-3-4: Miscellaneous: Palbociclib initiated  2022-1-5: New Treatment Plan: Letrozole/Ibrance stopped with new bone disease noted  2022-1-20: Chemotherapy: fulvestrant start  2022-2-3: Chemotherapy: Abemiciclib start    History of  Present Illness (HPI)/Interval History:  Ms. Brody presents today for routine FUV.  Denies sinus pain, fever/chills, sore throat.  Compliant with 50 mg BID dose of Verzenio, very rarely forgets.   She continues feeling well. She takes care of her mother who is 92years old.    Review of Systems:  14-point ROS otherwise negative, as per HPI.    I have personally reviewed PMH, PSH, Family History in the HISTORY tab of this chart, and unless noted above are not pertinent to the presenting complaint.  I have personally reviewed Social History as provided in the electronic medical record.    Allergies   Allergen Reactions    Denosumab Unknown and Other     Xgeva: Adverse Reaction: Osteoporosis with fracture    Should avoid all Biphosphonates       Current Outpatient Medications   Medication Instructions    abemaciclib (Verzenio) 50 mg tablet TAKE 1 TABLET BY MOUTH 2 TIMES A DAY. SWALLOW WHOLE.    abemaciclib (VERZENIO) 50 mg, oral, 2 times daily, Swallow whole.    calcium carbonate-vitamin D3 600 mg-5 mcg (200 unit) tablet 1 tablet, 2 times daily    cranberry extract 200 mg capsule Take by mouth.    fenofibrate (TRICOR) 54 mg, oral, Daily before breakfast    fluticasone (Flonase) 50 mcg/actuation nasal spray 2 sprays, Daily    furosemide (LASIX) 20 mg, oral, Daily    gabapentin (NEURONTIN) 100 mg, oral, Nightly    lisinopril 10 mg, oral, Daily    omega-3/dha/epa/fish oil (OMEGA-3 ORAL) Take by mouth. Omega 3 EPA + DHA 1000 MG Oral Capsule; Take 2 capsules by mouth twice daily.    TURMERIC ORAL No dose, route, or frequency recorded.         Objective    BSA: 1.78 meters squared  /84 (BP Location: Left arm, Patient Position: Sitting, BP Cuff Size: Adult)   Pulse 78   Temp 36.2 °C (97.2 °F) (Temporal)   Resp 18   Wt 74.8 kg (164 lb 14.5 oz)   SpO2 97%   BMI 32.21 kg/m²     Performance Status:  (0) Fully active, able to carry on all predisease performance without restriction     General: Pleasant woman, appears  stated age, well-groomed in street clothes,  awake/alert/oriented x3, no distress, alert and cooperative  Head: Short hair fully covering scalp. Symmetric facial expressions  Eyes: PERRL, EOMI, clear sclera, eyebrows present.  Ears/Nose/Mouth/Throat:  Oral mucous membranes moist. No oral ulcers. No palpable pre/post-auricular lymph nodes  Neck: No palpable cervical chain lymph nodes  Respiratory: unlabored breathing on room air, good air movement in all lung field  Breast: deferred per patient preference.  Cardio: Regular rate and rhythm, normal S1 and S2, radial pulses symmetric  GI: Nondistended, soft, non-tender abdomen  Musculoskeletal: Normal muscle bulk and tone, ROM intact, no joint swelling.  Rises from chair and walks unassisted.  Extremities: No ankle swelling, no arm or leg wounds, no clubbing on nails  Neuro: Alert, cognition intact, speech normal. Facial expressions symmetric.  No motor deficits noted. Sensation intact to touch and hot/cold.   Able to stand from seated position unassisted and walks around the room unassisted.  Psychological: Appropriate mood and behavior.  Skin: Warm and dry, no lesions, no rashes      Laboratory Data:  Lab Results   Component Value Date    WBC 2.5 (L) 09/03/2024    HGB 9.1 (L) 09/03/2024    HCT 29.3 (L) 09/03/2024    MCV 74 (L) 09/03/2024     09/03/2024    ANC 0.94 (L) 12/27/2023       Chemistry    Lab Results   Component Value Date/Time     09/03/2024 1414    K 3.8 09/03/2024 1414     09/03/2024 1414    CO2 28 09/03/2024 1414    BUN 16 09/03/2024 1414    CREATININE 1.03 09/03/2024 1414    Lab Results   Component Value Date/Time    CALCIUM 9.4 09/03/2024 1414    ALKPHOS 45 09/03/2024 1414    AST 27 09/03/2024 1414    ALT 17 09/03/2024 1414    BILITOT 1.0 09/03/2024 1414           Lab Results   Component Value Date    IRON 173 (H) 05/15/2024    TIBC 414 05/15/2024    FERRITIN 1,291 (H) 09/03/2024     Radiology:  I personally reviewed the images with  patient. Radiology report reviewed and listed below for reference.    8/28/24 NM bone whole body  IMPRESSION: 1. No focal increased radiotracer uptake involving right sacral ala lower lumbar vertebrae, consistent with treated metastatic disease.  2. No new foci of radiotracer uptake to suggest new bone metastasis.  3. Interval decrease in radiotracer uptake involving L2 vertebral  body, as compared to prior bone scan dated 09/05/2023, corresponding to a compression fracture on CT.  4. Contiguous foci of radiotracer uptake involving left 4th, 5th and  6th ribs anteriorly, likely due to recent fractures.    8/28/24 CT chest abdomen pelvis w IV contrast  Impression: CHEST:  1. Status post right breast surgery/reconstruction with no definite  soft tissue masses in the chest wall.  2. No enlarged intrathoracic lymphadenopathy.  3. Stable pulmonary findings with a scattered bilateral (right-sided  predominance) areas of scarring and atelectasis. Stable predominantly  right-sided nodular density/ill-defined pulmonary nodules which did  not show FDG uptake in prior PET scan and possibly  inflammatory/atelectasis, would warrant continued attention on  follow-up. No new suspicious pulmonary nodules or masses.  4. Cardiomegaly. Severe coronary artery calcifications.      ABDOMEN-PELVIS:  1. Status post right hemicolectomy with ileocolonic anastomosis. No  complications.  2. No metastatic disease in the abdomen or pelvis.      Osseous structures:  1.   Stable superior endplate compression deformity of T12 and L2  vertebral bodies. Old left 4th-6th rib anterior fractures. Please  refer to same day bone scan official read for further details.     Assessment/Plan:  Santy Brody is a 74 y.o. female with a history of metastatic (lung and bone involvement) breast cancer, who presents today follow-up evaluation on fulvestrant and abemaciclib.    1. Stage IV breast cancer: She has lung and bone involvement.  - Currently on fulvestrant  and abemaciclib, tolerating well   - Continue abemaciclib 50 mg BID (dose-reduced for anemia).  - Grade 1 diarrhea: with 50 mg BID has not been an issue  - Grade 1 neuropathy in feet. Stable.  - Next CT CAP and bone scan in 4 months (Jan 2024)     Genomics for liquid bx (Foundation 1) 1/2022.  1. PIK3A  2. ERBB2  3. MSI- undetermined  4. VUS -- BRCA2 and ESR     2. 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 Hemoglobin baseline 8-9  ---Secondary iron overload. Goal Ferritin 1000-1,500  Ferritin 1,168 on 6/12/24  - Recheck ferritin every 3-6 months for monitoring    3. Bone metastasis, osteopenia:  She is not a candidate for further bisphosphonate therapy.  - Compression fractures (osteoporotic?) noted in spine without active disease in those areas.  - No benefit to radiation in this case.  - Discussed use of Forteo as an option for bone health, but as this is a daily injection, she would like to avoid for now. If recurrent pain, could consider kyphoplasty.     4. Colon cancer:  No disease activity suspected. Most recent CEA is normal.  Colonoscopy 4/2024: negative for polyps     5. Lymphedema:  Stable. Encouraged compression sleeve use. Overall stable.    Peripheral T Cell Lymphoma  Enlarged lymph node, biopsy taken 12/19/2005   -check CBC, LDH, check for adenopathy on scans     Next visit in February 2025 with LAYO Lamb. Scans to be reviewed with Dr. Julián Estrada MD  Medical Oncology  [facility]

## 2024-12-11 ENCOUNTER — TELEPHONE (OUTPATIENT)
Dept: HEMATOLOGY/ONCOLOGY | Facility: CLINIC | Age: 74
End: 2024-12-11
Payer: COMMERCIAL

## 2024-12-11 DIAGNOSIS — C50.919 MALIGNANT NEOPLASM OF FEMALE BREAST, UNSPECIFIED ESTROGEN RECEPTOR STATUS, UNSPECIFIED LATERALITY, UNSPECIFIED SITE OF BREAST: Primary | ICD-10-CM

## 2024-12-11 RX ORDER — DIPHENHYDRAMINE HYDROCHLORIDE 50 MG/ML
50 INJECTION INTRAMUSCULAR; INTRAVENOUS AS NEEDED
OUTPATIENT
Start: 2025-02-18

## 2024-12-11 RX ORDER — LAMOTRIGINE 25 MG/1
500 TABLET ORAL ONCE
OUTPATIENT
Start: 2025-02-18

## 2024-12-11 RX ORDER — EPINEPHRINE 0.3 MG/.3ML
0.3 INJECTION SUBCUTANEOUS EVERY 5 MIN PRN
OUTPATIENT
Start: 2024-12-24

## 2024-12-11 RX ORDER — EPINEPHRINE 0.3 MG/.3ML
0.3 INJECTION SUBCUTANEOUS EVERY 5 MIN PRN
OUTPATIENT
Start: 2025-01-21

## 2024-12-11 RX ORDER — FAMOTIDINE 10 MG/ML
20 INJECTION INTRAVENOUS ONCE AS NEEDED
OUTPATIENT
Start: 2025-01-21

## 2024-12-11 RX ORDER — PROCHLORPERAZINE EDISYLATE 5 MG/ML
10 INJECTION INTRAMUSCULAR; INTRAVENOUS EVERY 6 HOURS PRN
OUTPATIENT
Start: 2025-01-21

## 2024-12-11 RX ORDER — ALBUTEROL SULFATE 0.83 MG/ML
3 SOLUTION RESPIRATORY (INHALATION) AS NEEDED
OUTPATIENT
Start: 2024-12-24

## 2024-12-11 RX ORDER — FAMOTIDINE 10 MG/ML
20 INJECTION INTRAVENOUS ONCE AS NEEDED
OUTPATIENT
Start: 2024-12-24

## 2024-12-11 RX ORDER — LAMOTRIGINE 25 MG/1
500 TABLET ORAL ONCE
OUTPATIENT
Start: 2025-01-21

## 2024-12-11 RX ORDER — FAMOTIDINE 10 MG/ML
20 INJECTION INTRAVENOUS ONCE AS NEEDED
OUTPATIENT
Start: 2025-02-18

## 2024-12-11 RX ORDER — PROCHLORPERAZINE EDISYLATE 5 MG/ML
10 INJECTION INTRAMUSCULAR; INTRAVENOUS EVERY 6 HOURS PRN
OUTPATIENT
Start: 2025-02-18

## 2024-12-11 RX ORDER — EPINEPHRINE 0.3 MG/.3ML
0.3 INJECTION SUBCUTANEOUS EVERY 5 MIN PRN
OUTPATIENT
Start: 2025-02-18

## 2024-12-11 RX ORDER — PROCHLORPERAZINE MALEATE 10 MG
10 TABLET ORAL EVERY 6 HOURS PRN
OUTPATIENT
Start: 2025-02-18

## 2024-12-11 RX ORDER — ALBUTEROL SULFATE 0.83 MG/ML
3 SOLUTION RESPIRATORY (INHALATION) AS NEEDED
OUTPATIENT
Start: 2025-01-21

## 2024-12-11 RX ORDER — DIPHENHYDRAMINE HYDROCHLORIDE 50 MG/ML
50 INJECTION INTRAMUSCULAR; INTRAVENOUS AS NEEDED
OUTPATIENT
Start: 2025-01-21

## 2024-12-11 RX ORDER — ALBUTEROL SULFATE 0.83 MG/ML
3 SOLUTION RESPIRATORY (INHALATION) AS NEEDED
OUTPATIENT
Start: 2025-02-18

## 2024-12-11 RX ORDER — DIPHENHYDRAMINE HYDROCHLORIDE 50 MG/ML
50 INJECTION INTRAMUSCULAR; INTRAVENOUS AS NEEDED
OUTPATIENT
Start: 2024-12-24

## 2024-12-11 RX ORDER — PROCHLORPERAZINE MALEATE 10 MG
10 TABLET ORAL EVERY 6 HOURS PRN
OUTPATIENT
Start: 2024-12-24

## 2024-12-11 RX ORDER — LAMOTRIGINE 25 MG/1
500 TABLET ORAL ONCE
OUTPATIENT
Start: 2024-12-24

## 2024-12-11 RX ORDER — PROCHLORPERAZINE MALEATE 10 MG
10 TABLET ORAL EVERY 6 HOURS PRN
OUTPATIENT
Start: 2025-01-21

## 2024-12-11 RX ORDER — PROCHLORPERAZINE EDISYLATE 5 MG/ML
10 INJECTION INTRAMUSCULAR; INTRAVENOUS EVERY 6 HOURS PRN
OUTPATIENT
Start: 2024-12-24

## 2024-12-11 NOTE — TELEPHONE ENCOUNTER
Dr. Estrada please sign the Fulvestrant orders so patient can be scheduled.  Marci please follow up with this .  Thank you.

## 2024-12-24 ENCOUNTER — INFUSION (OUTPATIENT)
Dept: HEMATOLOGY/ONCOLOGY | Facility: CLINIC | Age: 74
End: 2024-12-24
Payer: COMMERCIAL

## 2024-12-24 VITALS
RESPIRATION RATE: 18 BRPM | SYSTOLIC BLOOD PRESSURE: 144 MMHG | BODY MASS INDEX: 31.54 KG/M2 | OXYGEN SATURATION: 95 % | WEIGHT: 161.49 LBS | HEART RATE: 86 BPM | DIASTOLIC BLOOD PRESSURE: 83 MMHG | TEMPERATURE: 96.3 F

## 2024-12-24 DIAGNOSIS — C50.919 MALIGNANT NEOPLASM OF FEMALE BREAST, UNSPECIFIED ESTROGEN RECEPTOR STATUS, UNSPECIFIED LATERALITY, UNSPECIFIED SITE OF BREAST: ICD-10-CM

## 2024-12-24 PROCEDURE — 2500000004 HC RX 250 GENERAL PHARMACY W/ HCPCS (ALT 636 FOR OP/ED): Mod: JZ,JG | Performed by: STUDENT IN AN ORGANIZED HEALTH CARE EDUCATION/TRAINING PROGRAM

## 2024-12-24 PROCEDURE — 96402 CHEMO HORMON ANTINEOPL SQ/IM: CPT

## 2024-12-24 RX ORDER — EPINEPHRINE 0.3 MG/.3ML
0.3 INJECTION SUBCUTANEOUS EVERY 5 MIN PRN
Status: DISCONTINUED | OUTPATIENT
Start: 2024-12-24 | End: 2024-12-24 | Stop reason: HOSPADM

## 2024-12-24 RX ORDER — LAMOTRIGINE 25 MG/1
500 TABLET ORAL ONCE
Status: COMPLETED | OUTPATIENT
Start: 2024-12-24 | End: 2024-12-24

## 2024-12-24 RX ORDER — DIPHENHYDRAMINE HYDROCHLORIDE 50 MG/ML
50 INJECTION INTRAMUSCULAR; INTRAVENOUS AS NEEDED
Status: DISCONTINUED | OUTPATIENT
Start: 2024-12-24 | End: 2024-12-24 | Stop reason: HOSPADM

## 2024-12-24 RX ORDER — ALBUTEROL SULFATE 0.83 MG/ML
3 SOLUTION RESPIRATORY (INHALATION) AS NEEDED
Status: DISCONTINUED | OUTPATIENT
Start: 2024-12-24 | End: 2024-12-24 | Stop reason: HOSPADM

## 2024-12-24 RX ORDER — FAMOTIDINE 10 MG/ML
20 INJECTION INTRAVENOUS ONCE AS NEEDED
Status: DISCONTINUED | OUTPATIENT
Start: 2024-12-24 | End: 2024-12-24 | Stop reason: HOSPADM

## 2024-12-24 RX ORDER — PROCHLORPERAZINE EDISYLATE 5 MG/ML
10 INJECTION INTRAMUSCULAR; INTRAVENOUS EVERY 6 HOURS PRN
Status: DISCONTINUED | OUTPATIENT
Start: 2024-12-24 | End: 2024-12-24 | Stop reason: HOSPADM

## 2024-12-24 RX ORDER — PROCHLORPERAZINE MALEATE 10 MG
10 TABLET ORAL EVERY 6 HOURS PRN
Status: DISCONTINUED | OUTPATIENT
Start: 2024-12-24 | End: 2024-12-24 | Stop reason: HOSPADM

## 2024-12-24 ASSESSMENT — PAIN SCALES - GENERAL: PAINLEVEL_OUTOF10: 0-NO PAIN

## 2025-01-14 ENCOUNTER — APPOINTMENT (OUTPATIENT)
Dept: OPHTHALMOLOGY | Facility: CLINIC | Age: 75
End: 2025-01-14
Payer: COMMERCIAL

## 2025-01-21 ENCOUNTER — INFUSION (OUTPATIENT)
Dept: HEMATOLOGY/ONCOLOGY | Facility: CLINIC | Age: 75
End: 2025-01-21
Payer: COMMERCIAL

## 2025-01-21 VITALS
BODY MASS INDEX: 31.32 KG/M2 | RESPIRATION RATE: 18 BRPM | TEMPERATURE: 96.8 F | HEART RATE: 88 BPM | WEIGHT: 160.38 LBS | SYSTOLIC BLOOD PRESSURE: 156 MMHG | DIASTOLIC BLOOD PRESSURE: 97 MMHG | OXYGEN SATURATION: 96 %

## 2025-01-21 DIAGNOSIS — C50.919 MALIGNANT NEOPLASM OF FEMALE BREAST, UNSPECIFIED ESTROGEN RECEPTOR STATUS, UNSPECIFIED LATERALITY, UNSPECIFIED SITE OF BREAST: ICD-10-CM

## 2025-01-21 PROCEDURE — 2500000004 HC RX 250 GENERAL PHARMACY W/ HCPCS (ALT 636 FOR OP/ED): Mod: JZ,TB

## 2025-01-21 PROCEDURE — 96402 CHEMO HORMON ANTINEOPL SQ/IM: CPT

## 2025-01-21 RX ORDER — EPINEPHRINE 0.3 MG/.3ML
0.3 INJECTION SUBCUTANEOUS EVERY 5 MIN PRN
Status: DISCONTINUED | OUTPATIENT
Start: 2025-01-21 | End: 2025-01-21 | Stop reason: HOSPADM

## 2025-01-21 RX ORDER — PROCHLORPERAZINE MALEATE 10 MG
10 TABLET ORAL EVERY 6 HOURS PRN
Status: DISCONTINUED | OUTPATIENT
Start: 2025-01-21 | End: 2025-01-21 | Stop reason: HOSPADM

## 2025-01-21 RX ORDER — ALBUTEROL SULFATE 0.83 MG/ML
3 SOLUTION RESPIRATORY (INHALATION) AS NEEDED
Status: DISCONTINUED | OUTPATIENT
Start: 2025-01-21 | End: 2025-01-21 | Stop reason: HOSPADM

## 2025-01-21 RX ORDER — PROCHLORPERAZINE EDISYLATE 5 MG/ML
10 INJECTION INTRAMUSCULAR; INTRAVENOUS EVERY 6 HOURS PRN
Status: DISCONTINUED | OUTPATIENT
Start: 2025-01-21 | End: 2025-01-21 | Stop reason: HOSPADM

## 2025-01-21 RX ORDER — LAMOTRIGINE 25 MG/1
500 TABLET ORAL ONCE
Status: COMPLETED | OUTPATIENT
Start: 2025-01-21 | End: 2025-01-21

## 2025-01-21 RX ORDER — DIPHENHYDRAMINE HYDROCHLORIDE 50 MG/ML
50 INJECTION INTRAMUSCULAR; INTRAVENOUS AS NEEDED
Status: DISCONTINUED | OUTPATIENT
Start: 2025-01-21 | End: 2025-01-21 | Stop reason: HOSPADM

## 2025-01-21 RX ORDER — FAMOTIDINE 10 MG/ML
20 INJECTION INTRAVENOUS ONCE AS NEEDED
Status: DISCONTINUED | OUTPATIENT
Start: 2025-01-21 | End: 2025-01-21 | Stop reason: HOSPADM

## 2025-01-21 RX ADMIN — FULVESTRANT 500 MG: 50 INJECTION INTRAMUSCULAR at 13:43

## 2025-01-21 ASSESSMENT — PAIN SCALES - GENERAL: PAINLEVEL_OUTOF10: 2

## 2025-02-01 DIAGNOSIS — I10 BENIGN ESSENTIAL HYPERTENSION: ICD-10-CM

## 2025-02-01 DIAGNOSIS — E78.5 HYPERLIPIDEMIA, UNSPECIFIED HYPERLIPIDEMIA TYPE: ICD-10-CM

## 2025-02-01 DIAGNOSIS — I89.0 LYMPHEDEMA OF RIGHT ARM: ICD-10-CM

## 2025-02-03 RX ORDER — FUROSEMIDE 20 MG/1
20 TABLET ORAL DAILY
Qty: 90 TABLET | Refills: 1 | Status: SHIPPED | OUTPATIENT
Start: 2025-02-03

## 2025-02-03 RX ORDER — LISINOPRIL 10 MG/1
10 TABLET ORAL DAILY
Qty: 90 TABLET | Refills: 1 | Status: SHIPPED | OUTPATIENT
Start: 2025-02-03

## 2025-02-03 RX ORDER — FENOFIBRATE 54 MG/1
TABLET ORAL
Qty: 90 TABLET | Refills: 1 | Status: SHIPPED | OUTPATIENT
Start: 2025-02-03

## 2025-02-04 ENCOUNTER — HOSPITAL ENCOUNTER (OUTPATIENT)
Dept: RADIOLOGY | Facility: CLINIC | Age: 75
Discharge: HOME | End: 2025-02-04
Payer: COMMERCIAL

## 2025-02-04 ENCOUNTER — APPOINTMENT (OUTPATIENT)
Dept: PRIMARY CARE | Facility: CLINIC | Age: 75
End: 2025-02-04
Payer: COMMERCIAL

## 2025-02-04 VITALS
HEART RATE: 83 BPM | BODY MASS INDEX: 31.41 KG/M2 | WEIGHT: 160 LBS | SYSTOLIC BLOOD PRESSURE: 138 MMHG | HEIGHT: 60 IN | OXYGEN SATURATION: 99 % | DIASTOLIC BLOOD PRESSURE: 82 MMHG

## 2025-02-04 DIAGNOSIS — I89.0 LYMPHEDEMA OF RIGHT ARM: ICD-10-CM

## 2025-02-04 DIAGNOSIS — R73.9 HYPERGLYCEMIA: ICD-10-CM

## 2025-02-04 DIAGNOSIS — M79.672 PAIN IN BOTH FEET: ICD-10-CM

## 2025-02-04 DIAGNOSIS — I10 BENIGN ESSENTIAL HYPERTENSION: ICD-10-CM

## 2025-02-04 DIAGNOSIS — M25.512 ACUTE PAIN OF LEFT SHOULDER: Primary | ICD-10-CM

## 2025-02-04 DIAGNOSIS — M81.0 OSTEOPOROSIS, UNSPECIFIED OSTEOPOROSIS TYPE, UNSPECIFIED PATHOLOGICAL FRACTURE PRESENCE: ICD-10-CM

## 2025-02-04 DIAGNOSIS — Z00.00 HEALTHCARE MAINTENANCE: ICD-10-CM

## 2025-02-04 DIAGNOSIS — Z12.31 BREAST CANCER SCREENING BY MAMMOGRAM: ICD-10-CM

## 2025-02-04 DIAGNOSIS — M79.671 PAIN IN BOTH FEET: ICD-10-CM

## 2025-02-04 DIAGNOSIS — Z85.3 HISTORY OF ADENOCARCINOMA OF BREAST: ICD-10-CM

## 2025-02-04 DIAGNOSIS — E78.5 HYPERLIPIDEMIA, UNSPECIFIED HYPERLIPIDEMIA TYPE: ICD-10-CM

## 2025-02-04 DIAGNOSIS — M25.512 ACUTE PAIN OF LEFT SHOULDER: ICD-10-CM

## 2025-02-04 PROCEDURE — 1125F AMNT PAIN NOTED PAIN PRSNT: CPT | Performed by: STUDENT IN AN ORGANIZED HEALTH CARE EDUCATION/TRAINING PROGRAM

## 2025-02-04 PROCEDURE — 1159F MED LIST DOCD IN RCRD: CPT | Performed by: STUDENT IN AN ORGANIZED HEALTH CARE EDUCATION/TRAINING PROGRAM

## 2025-02-04 PROCEDURE — 3008F BODY MASS INDEX DOCD: CPT | Performed by: STUDENT IN AN ORGANIZED HEALTH CARE EDUCATION/TRAINING PROGRAM

## 2025-02-04 PROCEDURE — 3079F DIAST BP 80-89 MM HG: CPT | Performed by: STUDENT IN AN ORGANIZED HEALTH CARE EDUCATION/TRAINING PROGRAM

## 2025-02-04 PROCEDURE — 99214 OFFICE O/P EST MOD 30 MIN: CPT | Performed by: STUDENT IN AN ORGANIZED HEALTH CARE EDUCATION/TRAINING PROGRAM

## 2025-02-04 PROCEDURE — 73030 X-RAY EXAM OF SHOULDER: CPT | Mod: LEFT SIDE | Performed by: RADIOLOGY

## 2025-02-04 PROCEDURE — 3075F SYST BP GE 130 - 139MM HG: CPT | Performed by: STUDENT IN AN ORGANIZED HEALTH CARE EDUCATION/TRAINING PROGRAM

## 2025-02-04 PROCEDURE — 73030 X-RAY EXAM OF SHOULDER: CPT | Mod: LT

## 2025-02-04 PROCEDURE — 1036F TOBACCO NON-USER: CPT | Performed by: STUDENT IN AN ORGANIZED HEALTH CARE EDUCATION/TRAINING PROGRAM

## 2025-02-04 PROCEDURE — 1123F ACP DISCUSS/DSCN MKR DOCD: CPT | Performed by: STUDENT IN AN ORGANIZED HEALTH CARE EDUCATION/TRAINING PROGRAM

## 2025-02-04 PROCEDURE — G0439 PPPS, SUBSEQ VISIT: HCPCS | Performed by: STUDENT IN AN ORGANIZED HEALTH CARE EDUCATION/TRAINING PROGRAM

## 2025-02-04 ASSESSMENT — PAIN SCALES - GENERAL: PAINLEVEL_OUTOF10: 2

## 2025-02-04 NOTE — PROGRESS NOTES
Subjective   Patient ID: Santy Brody is a 74 y.o. female who presents for Annual Exam.    HPI  75 yo female here for [facility]  Mammogram ordered  Colonoscopy done 4/2024, with 5 year repeat  Immunizations: UTD on flu, RSV, covid, TDap  Well balanced diet  Wants to start exercising  Sees dentist prn  No hearing or vision changes  No tobacco use  Occasional alcohol use  2. L shoulder pain  Pain for 3 weeks, started after doing some new exercises  Taking tylenol/ibuprofen as needed  3. HTN  On lisinopril 10 mg, furosemide 20 mg daily  4. Breast cancer in R breast  On Verzenio daily  Fulvestrant injections every 4 weeks  Following oncologist  5. Neuropathy in fingers and feet/RLS  On gabapentin 100 mg nightly  6. Hypertriglyceridemia  On fenofibrate 54 mg daily  7. Osteoporosis  Taking calcium, vitamin D    Review of Systems  All pertinent positive symptoms are included in the history of present illness.    All other systems have been reviewed and are negative and noncontributory to this patient's current ailments.     Allergies   Allergen Reactions    Denosumab Unknown and Other     Xgeva: Adverse Reaction: Osteoporosis with fracture    Should avoid all Biphosphonates        Immunization History   Administered Date(s) Administered    Flu vaccine, trivalent, preservative free, age 6 months and greater (Fluarix/Fluzone/Flulaval) 10/28/2015, 09/29/2016    Influenza, Seasonal, Quadrivalent, Adjuvanted 11/16/2022    Influenza, Unspecified 12/30/2023    Influenza, seasonal, injectable 12/01/2020    Moderna COVID-19 vaccine, 12 years and older (50mcg/0.5mL)(Spikevax) 11/05/2023    Moderna COVID-19 vaccine, bivalent, blue cap/gray label *Check age/dose* 09/28/2022    Moderna SARS-CoV-2 Vaccination 02/17/2021, 03/17/2021, 10/26/2021, 04/01/2022    Pneumococcal polysaccharide vaccine, 23-valent, age 2 years and older (PNEUMOVAX 23) 11/01/2010, 12/01/2020    Pneumococcal, Unspecified 11/30/2017    RESPIRATORY SYNCYTIAL VIRUS  (RSV), ELIGIBLE PREGNANT PTS, 0.5 ML (ABRYSVO) 09/13/2023    Yellow Fever 07/29/2015    Zoster vaccine, recombinant, adult (SHINGRIX) 04/19/2023, 10/15/2023       Objective   Vitals:    02/04/25 1256   BP: 138/82   Pulse: 83   SpO2: 99%   Weight: 72.6 kg (160 lb)   Height: 1.524 m (5')       Physical Exam  CONSTITUTIONAL - well nourished, well developed, looks like stated age, in no acute distress  SKIN - normal skin color and pigmentation. No rash, lesions, or nodules visualized  HEAD - no trauma, normocephalic  EYES - extraocular muscles are intact  ENT - atraumatic  NECK - no neck mass was observed  LUNGS - CTA B/L  CARDIAC - regular rate and regular rhythm  ABDOMEN - no organomegaly, soft, nontender, nondistended  EXTREMITIES - R UE swelling  MSK - L shoulder: decreased active abduction and internal rotation due to pain, positive carter, neg jobs  NEUROLOGICAL - alert, oriented and no focal signs  PSYCHIATRIC - alert, pleasant and cordial, age-appropriate  IMMUNOLOGIC - no cervical lymphadenopathy     Assessment/Plan   75 yo female here for [facility]  Mammogram ordered  Colonoscopy done 4/2024, with 5 year repeat  Immunizations: UTD on flu, RSV, covid, TDap  Labs ordered  2. L shoulder pain  Xray, start PT  3. HTN  Continue lisinopril 10 mg, furosemide 20 mg daily  4. Breast cancer in R breast  Continue Verzenio daily  Continue Fulvestrant injections every 4 weeks  Continue following oncologist  5. Neuropathy in fingers and feet/RLS  Continue gabapentin 100 mg nightly  6. Hypertriglyceridemia  Continue fenofibrate 54 mg daily  7. Osteoporosis  Continue taking calcium, vitamin D    RTC in 6 months

## 2025-02-06 ENCOUNTER — TELEPHONE (OUTPATIENT)
Dept: PRIMARY CARE | Facility: CLINIC | Age: 75
End: 2025-02-06
Payer: COMMERCIAL

## 2025-02-06 DIAGNOSIS — M25.512 ACUTE PAIN OF LEFT SHOULDER: Primary | ICD-10-CM

## 2025-02-18 ENCOUNTER — INFUSION (OUTPATIENT)
Dept: HEMATOLOGY/ONCOLOGY | Facility: CLINIC | Age: 75
End: 2025-02-18
Payer: COMMERCIAL

## 2025-02-18 VITALS
RESPIRATION RATE: 18 BRPM | WEIGHT: 163.69 LBS | OXYGEN SATURATION: 98 % | DIASTOLIC BLOOD PRESSURE: 98 MMHG | SYSTOLIC BLOOD PRESSURE: 154 MMHG | TEMPERATURE: 97 F | HEART RATE: 88 BPM | BODY MASS INDEX: 31.97 KG/M2

## 2025-02-18 DIAGNOSIS — C50.919 MALIGNANT NEOPLASM OF FEMALE BREAST, UNSPECIFIED ESTROGEN RECEPTOR STATUS, UNSPECIFIED LATERALITY, UNSPECIFIED SITE OF BREAST: ICD-10-CM

## 2025-02-18 PROCEDURE — 96402 CHEMO HORMON ANTINEOPL SQ/IM: CPT

## 2025-02-18 PROCEDURE — 2500000004 HC RX 250 GENERAL PHARMACY W/ HCPCS (ALT 636 FOR OP/ED): Mod: JZ,TB

## 2025-02-18 RX ORDER — DIPHENHYDRAMINE HYDROCHLORIDE 50 MG/ML
50 INJECTION INTRAMUSCULAR; INTRAVENOUS AS NEEDED
Status: DISCONTINUED | OUTPATIENT
Start: 2025-02-18 | End: 2025-02-18 | Stop reason: HOSPADM

## 2025-02-18 RX ORDER — ALBUTEROL SULFATE 0.83 MG/ML
3 SOLUTION RESPIRATORY (INHALATION) AS NEEDED
Status: DISCONTINUED | OUTPATIENT
Start: 2025-02-18 | End: 2025-02-18 | Stop reason: HOSPADM

## 2025-02-18 RX ORDER — PROCHLORPERAZINE MALEATE 10 MG
10 TABLET ORAL EVERY 6 HOURS PRN
Status: DISCONTINUED | OUTPATIENT
Start: 2025-02-18 | End: 2025-02-18 | Stop reason: HOSPADM

## 2025-02-18 RX ORDER — LAMOTRIGINE 25 MG/1
500 TABLET ORAL ONCE
Status: COMPLETED | OUTPATIENT
Start: 2025-02-18 | End: 2025-02-18

## 2025-02-18 RX ORDER — EPINEPHRINE 0.3 MG/.3ML
0.3 INJECTION SUBCUTANEOUS EVERY 5 MIN PRN
Status: DISCONTINUED | OUTPATIENT
Start: 2025-02-18 | End: 2025-02-18 | Stop reason: HOSPADM

## 2025-02-18 RX ORDER — PROCHLORPERAZINE EDISYLATE 5 MG/ML
10 INJECTION INTRAMUSCULAR; INTRAVENOUS EVERY 6 HOURS PRN
Status: DISCONTINUED | OUTPATIENT
Start: 2025-02-18 | End: 2025-02-18 | Stop reason: HOSPADM

## 2025-02-18 RX ORDER — FAMOTIDINE 10 MG/ML
20 INJECTION, SOLUTION INTRAVENOUS ONCE AS NEEDED
Status: DISCONTINUED | OUTPATIENT
Start: 2025-02-18 | End: 2025-02-18 | Stop reason: HOSPADM

## 2025-02-18 RX ADMIN — FULVESTRANT 500 MG: 50 INJECTION INTRAMUSCULAR at 13:37

## 2025-02-18 ASSESSMENT — PAIN SCALES - GENERAL: PAINLEVEL_OUTOF10: 0-NO PAIN

## 2025-02-19 ENCOUNTER — TELEPHONE (OUTPATIENT)
Dept: PRIMARY CARE | Facility: CLINIC | Age: 75
End: 2025-02-19
Payer: COMMERCIAL

## 2025-02-19 LAB
ALBUMIN SERPL-MCNC: 4.7 G/DL (ref 3.6–5.1)
ALP SERPL-CCNC: 49 U/L (ref 37–153)
ALT SERPL-CCNC: 12 U/L (ref 6–29)
ANION GAP SERPL CALCULATED.4IONS-SCNC: 9 MMOL/L (CALC) (ref 7–17)
AST SERPL-CCNC: 21 U/L (ref 10–35)
BILIRUB SERPL-MCNC: 0.9 MG/DL (ref 0.2–1.2)
BUN SERPL-MCNC: 19 MG/DL (ref 7–25)
CALCIUM SERPL-MCNC: 9.7 MG/DL (ref 8.6–10.4)
CHLORIDE SERPL-SCNC: 96 MMOL/L (ref 98–110)
CHOLEST SERPL-MCNC: 224 MG/DL
CHOLEST/HDLC SERPL: 3.2 (CALC)
CO2 SERPL-SCNC: 29 MMOL/L (ref 20–32)
CREAT SERPL-MCNC: 1.1 MG/DL (ref 0.6–1)
EGFRCR SERPLBLD CKD-EPI 2021: 53 ML/MIN/1.73M2
EST. AVERAGE GLUCOSE BLD GHB EST-MCNC: 128 MG/DL
EST. AVERAGE GLUCOSE BLD GHB EST-SCNC: 7.1 MMOL/L
GLUCOSE SERPL-MCNC: 115 MG/DL (ref 65–99)
HBA1C MFR BLD: 6.1 % OF TOTAL HGB
HDLC SERPL-MCNC: 71 MG/DL
LDLC SERPL CALC-MCNC: 125 MG/DL (CALC)
NONHDLC SERPL-MCNC: 153 MG/DL (CALC)
POTASSIUM SERPL-SCNC: 4.2 MMOL/L (ref 3.5–5.3)
PROT SERPL-MCNC: 8.3 G/DL (ref 6.1–8.1)
SODIUM SERPL-SCNC: 134 MMOL/L (ref 135–146)
TRIGL SERPL-MCNC: 165 MG/DL
TSH SERPL-ACNC: 1.4 MIU/L (ref 0.4–4.5)

## 2025-02-21 ENCOUNTER — HOSPITAL ENCOUNTER (OUTPATIENT)
Dept: RADIOLOGY | Facility: HOSPITAL | Age: 75
Discharge: HOME | End: 2025-02-21
Payer: COMMERCIAL

## 2025-02-21 DIAGNOSIS — Z12.31 BREAST CANCER SCREENING BY MAMMOGRAM: ICD-10-CM

## 2025-02-21 PROCEDURE — 77067 SCR MAMMO BI INCL CAD: CPT | Mod: 52,LT

## 2025-02-24 ENCOUNTER — EVALUATION (OUTPATIENT)
Dept: PHYSICAL THERAPY | Facility: CLINIC | Age: 75
End: 2025-02-24
Payer: COMMERCIAL

## 2025-02-24 DIAGNOSIS — M25.512 ACUTE PAIN OF LEFT SHOULDER: ICD-10-CM

## 2025-02-24 PROCEDURE — 97530 THERAPEUTIC ACTIVITIES: CPT | Mod: GP | Performed by: PHYSICAL THERAPIST

## 2025-02-24 PROCEDURE — 97110 THERAPEUTIC EXERCISES: CPT | Mod: GP | Performed by: PHYSICAL THERAPIST

## 2025-02-24 PROCEDURE — 97162 PT EVAL MOD COMPLEX 30 MIN: CPT | Mod: GP | Performed by: PHYSICAL THERAPIST

## 2025-02-24 ASSESSMENT — ENCOUNTER SYMPTOMS
DEPRESSION: 0
LOSS OF SENSATION IN FEET: 0
OCCASIONAL FEELINGS OF UNSTEADINESS: 0

## 2025-02-24 NOTE — PROGRESS NOTES
Physical Therapy Evaluation and Treatment      Patient Name: Santy Brody  MRN: [id number]  Today's Date: 2/24/2025  Time Calculation  Start Time: 1438  Stop Time: 1519  Time Calculation (min): 41 min  PT Therapeutic Procedures Time Entry  Therapeutic Exercise Time Entry: 15  Therapeutic Activity Time Entry: 10      Insurance:  Visit number: 1 of 6  Authorization info: 2025: ANTH FED R - NO AUTH / $7500 OOP not met / $35 COPAY / 50V pt/ot/st - 0 used / ds 2/20/25.   Dry Needling - Not covered //  Insurance Type: Payor: AX[employer] / Plan: ANTHEM HMP / Product Type: *No Product type* /     Current Problem:   1. Acute pain of left shoulder  Referral to Physical Therapy    Follow Up In Physical Therapy          Subjective    General:  Pt reports she has lymphedema in right arm and felt like she needed to balance out her arms and perform exercises on left and noticed all of a sudden she started having pain in the left shoulder after doing the exercises for a week. Will sometimes hear the left shoulder click. Hasn't been doing exercises since than. Raising it high, behind her back is painful with ache. Mainly in the front and side of her left shoulder. Putting on coat and shirt overhead is difficult. Feels aching in left shoulder when driving and turning left to right. Pain wakes her up in the middle of the night, especially when sleeping on her side. Is right handed. Tried K-tape, no relief. Cannot clean, etc. Due to pain.       Precautions: none  Pain:  6/10      Objective   ROM   Left sh AROM:  Flex 149 deg  ABD 82 deg   ER 25 deg  IR L3  (All with pain)    MMT   Left sh strength:  Flex  4-/5  ABD 3/5  ER 4-/5  IR 4-/5  (Pain with all)      Palpation   TTP left RTC musculature, increased at tendon  TTP left proximal biceps       Flexibility   Left UT with mild tightness      Special Tests   Shoulder: Drop Arm: Left negative, Impingement: Left positive, [place]: Left positive         Outcome Measures:  Other: UEFI  42/80    Treatments:  Therapeutic Exercise: Access Code [email or web address]  Wall flex slides  S/L sh ER  S/L sh flexion  S/L sh ABD      Therapeutic activity:  Educated pt on eval findings and POC  Educated pt on anatomy of sh  Reviewed technique for performance of daily tasks      Assessment   Assessment:   Pt is a 74 y.o. female with left shoulder partial RTC tear and tendonitis. Pt with decreased AROM, reduced strength, posture deficits, and flexibility limitations. Pt will benefit from skilled PT to address the above impairments for improvement in functional activities.       Moderate complexity due to patient's clinical presentation being evolving with changing characteristics, with comorbidities/complexities to include cancer, diabetes, HTN, osteoporosis, OA, neuropathy, anemia, all of which may negatively impact rehab tolerance and progression.         Plan:   Treatment/Interventions: Education/ Instruction, Manual therapy, Neuromuscular re-education, Self care/ home management, Therapeutic activities, Taping techniques, Therapeutic exercises  PT Plan: Skilled PT  PT Frequency: 1 time per week  Duration: 5 more visits  Number of Treatments Authorized: 50V  Rehab Potential: Good  Plan of Care Agreement: Patient      Goals:   Active       Mobility       Goal 1       Start:  02/24/25    Expected End:  05/25/25       Pt will improve left sh AROM to WNL to improve I/ADLs.         Goal 2       Start:  02/24/25    Expected End:  05/25/25       Pt will improve left sh strength to 4+/5 to improve I/ADLs            Pain       Goal 1       Start:  02/24/25    Expected End:  05/25/25       Pt will perform all dressing with 0/10 pain and no difficulty         Goal 2       Start:  02/24/25    Expected End:  05/25/25       Pt will perform all household tasks with 0/10 pain

## 2025-02-25 ENCOUNTER — TELEPHONE (OUTPATIENT)
Dept: PRIMARY CARE | Facility: CLINIC | Age: 75
End: 2025-02-25
Payer: COMMERCIAL

## 2025-02-27 ENCOUNTER — OFFICE VISIT (OUTPATIENT)
Dept: HEMATOLOGY/ONCOLOGY | Facility: CLINIC | Age: 75
End: 2025-02-27
Payer: COMMERCIAL

## 2025-02-27 VITALS
BODY MASS INDEX: 31.3 KG/M2 | OXYGEN SATURATION: 97 % | TEMPERATURE: 96.3 F | SYSTOLIC BLOOD PRESSURE: 146 MMHG | DIASTOLIC BLOOD PRESSURE: 85 MMHG | HEART RATE: 71 BPM | WEIGHT: 160.27 LBS | RESPIRATION RATE: 16 BRPM

## 2025-02-27 DIAGNOSIS — C50.919 MALIGNANT NEOPLASM OF FEMALE BREAST, UNSPECIFIED ESTROGEN RECEPTOR STATUS, UNSPECIFIED LATERALITY, UNSPECIFIED SITE OF BREAST: Primary | ICD-10-CM

## 2025-02-27 DIAGNOSIS — E61.1 IRON DEFICIENCY: ICD-10-CM

## 2025-02-27 DIAGNOSIS — G62.9 PERIPHERAL POLYNEUROPATHY: ICD-10-CM

## 2025-02-27 DIAGNOSIS — C50.919 CARCINOMA OF BREAST METASTATIC TO BONE, UNSPECIFIED LATERALITY (MULTI): ICD-10-CM

## 2025-02-27 DIAGNOSIS — C79.51 CARCINOMA OF BREAST METASTATIC TO BONE, UNSPECIFIED LATERALITY (MULTI): ICD-10-CM

## 2025-02-27 LAB
ALBUMIN SERPL BCP-MCNC: 4.4 G/DL (ref 3.4–5)
ALP SERPL-CCNC: 53 U/L (ref 33–136)
ALT SERPL W P-5'-P-CCNC: 14 U/L (ref 7–45)
ANION GAP SERPL CALC-SCNC: 14 MMOL/L (ref 10–20)
AST SERPL W P-5'-P-CCNC: 21 U/L (ref 9–39)
BASOPHILS # BLD AUTO: 0.05 X10*3/UL (ref 0–0.1)
BASOPHILS NFR BLD AUTO: 1.3 %
BILIRUB SERPL-MCNC: 0.9 MG/DL (ref 0–1.2)
BUN SERPL-MCNC: 23 MG/DL (ref 6–23)
CALCIUM SERPL-MCNC: 9.9 MG/DL (ref 8.6–10.3)
CANCER AG27-29 SERPL-ACNC: 180.6 U/ML (ref 0–38.6)
CHLORIDE SERPL-SCNC: 94 MMOL/L (ref 98–107)
CO2 SERPL-SCNC: 29 MMOL/L (ref 21–32)
CREAT SERPL-MCNC: 1.44 MG/DL (ref 0.5–1.05)
EGFRCR SERPLBLD CKD-EPI 2021: 39 ML/MIN/1.73M*2
EOSINOPHIL # BLD AUTO: 0.05 X10*3/UL (ref 0–0.4)
EOSINOPHIL NFR BLD AUTO: 1.3 %
ERYTHROCYTE [DISTWIDTH] IN BLOOD BY AUTOMATED COUNT: 15.8 % (ref 11.5–14.5)
FERRITIN SERPL-MCNC: 1433 NG/ML (ref 8–150)
GLUCOSE SERPL-MCNC: 105 MG/DL (ref 74–99)
HCT VFR BLD AUTO: 29.1 % (ref 36–46)
HGB BLD-MCNC: 9.2 G/DL (ref 12–16)
IMM GRANULOCYTES # BLD AUTO: 0.03 X10*3/UL (ref 0–0.5)
IMM GRANULOCYTES NFR BLD AUTO: 0.8 % (ref 0–0.9)
IRON SATN MFR SERPL: 34 % (ref 25–45)
IRON SERPL-MCNC: 144 UG/DL (ref 35–150)
LDH SERPL L TO P-CCNC: 209 U/L (ref 84–246)
LYMPHOCYTES # BLD AUTO: 1.19 X10*3/UL (ref 0.8–3)
LYMPHOCYTES NFR BLD AUTO: 30.6 %
MCH RBC QN AUTO: 22.8 PG (ref 26–34)
MCHC RBC AUTO-ENTMCNC: 31.6 G/DL (ref 32–36)
MCV RBC AUTO: 72 FL (ref 80–100)
MONOCYTES # BLD AUTO: 0.36 X10*3/UL (ref 0.05–0.8)
MONOCYTES NFR BLD AUTO: 9.3 %
NEUTROPHILS # BLD AUTO: 2.21 X10*3/UL (ref 1.6–5.5)
NEUTROPHILS NFR BLD AUTO: 56.7 %
NRBC BLD-RTO: 1 /100 WBCS (ref 0–0)
PLATELET # BLD AUTO: 296 X10*3/UL (ref 150–450)
POTASSIUM SERPL-SCNC: 3.8 MMOL/L (ref 3.5–5.3)
PROT SERPL-MCNC: 8.5 G/DL (ref 6.4–8.2)
RBC # BLD AUTO: 4.03 X10*6/UL (ref 4–5.2)
SODIUM SERPL-SCNC: 133 MMOL/L (ref 136–145)
TIBC SERPL-MCNC: 426 UG/DL (ref 240–445)
UIBC SERPL-MCNC: 282 UG/DL (ref 110–370)
WBC # BLD AUTO: 3.9 X10*3/UL (ref 4.4–11.3)

## 2025-02-27 PROCEDURE — 83615 LACTATE (LD) (LDH) ENZYME: CPT

## 2025-02-27 PROCEDURE — 1126F AMNT PAIN NOTED NONE PRSNT: CPT

## 2025-02-27 PROCEDURE — 1159F MED LIST DOCD IN RCRD: CPT

## 2025-02-27 PROCEDURE — 85025 COMPLETE CBC W/AUTO DIFF WBC: CPT

## 2025-02-27 PROCEDURE — 83550 IRON BINDING TEST: CPT

## 2025-02-27 PROCEDURE — 99215 OFFICE O/P EST HI 40 MIN: CPT

## 2025-02-27 PROCEDURE — 80053 COMPREHEN METABOLIC PANEL: CPT

## 2025-02-27 PROCEDURE — 3077F SYST BP >= 140 MM HG: CPT

## 2025-02-27 PROCEDURE — 1123F ACP DISCUSS/DSCN MKR DOCD: CPT

## 2025-02-27 PROCEDURE — 86300 IMMUNOASSAY TUMOR CA 15-3: CPT

## 2025-02-27 PROCEDURE — 3079F DIAST BP 80-89 MM HG: CPT

## 2025-02-27 PROCEDURE — 82728 ASSAY OF FERRITIN: CPT

## 2025-02-27 RX ORDER — EPINEPHRINE 0.3 MG/.3ML
0.3 INJECTION SUBCUTANEOUS EVERY 5 MIN PRN
OUTPATIENT
Start: 2025-03-19

## 2025-02-27 RX ORDER — PROCHLORPERAZINE MALEATE 10 MG
10 TABLET ORAL EVERY 6 HOURS PRN
OUTPATIENT
Start: 2025-03-19

## 2025-02-27 RX ORDER — DIPHENHYDRAMINE HYDROCHLORIDE 50 MG/ML
50 INJECTION INTRAMUSCULAR; INTRAVENOUS AS NEEDED
OUTPATIENT
Start: 2025-03-19

## 2025-02-27 RX ORDER — FAMOTIDINE 10 MG/ML
20 INJECTION, SOLUTION INTRAVENOUS ONCE AS NEEDED
OUTPATIENT
Start: 2025-04-16

## 2025-02-27 RX ORDER — PROCHLORPERAZINE EDISYLATE 5 MG/ML
10 INJECTION INTRAMUSCULAR; INTRAVENOUS EVERY 6 HOURS PRN
OUTPATIENT
Start: 2025-04-16

## 2025-02-27 RX ORDER — ALBUTEROL SULFATE 0.83 MG/ML
3 SOLUTION RESPIRATORY (INHALATION) AS NEEDED
OUTPATIENT
Start: 2025-04-16

## 2025-02-27 RX ORDER — PROCHLORPERAZINE EDISYLATE 5 MG/ML
10 INJECTION INTRAMUSCULAR; INTRAVENOUS EVERY 6 HOURS PRN
OUTPATIENT
Start: 2025-03-19

## 2025-02-27 RX ORDER — FAMOTIDINE 10 MG/ML
20 INJECTION, SOLUTION INTRAVENOUS ONCE AS NEEDED
OUTPATIENT
Start: 2025-03-19

## 2025-02-27 RX ORDER — PROCHLORPERAZINE MALEATE 10 MG
10 TABLET ORAL EVERY 6 HOURS PRN
OUTPATIENT
Start: 2025-04-16

## 2025-02-27 RX ORDER — EPINEPHRINE 0.3 MG/.3ML
0.3 INJECTION SUBCUTANEOUS EVERY 5 MIN PRN
OUTPATIENT
Start: 2025-04-16

## 2025-02-27 RX ORDER — DIPHENHYDRAMINE HYDROCHLORIDE 50 MG/ML
50 INJECTION INTRAMUSCULAR; INTRAVENOUS AS NEEDED
OUTPATIENT
Start: 2025-04-16

## 2025-02-27 RX ORDER — ALBUTEROL SULFATE 0.83 MG/ML
3 SOLUTION RESPIRATORY (INHALATION) AS NEEDED
OUTPATIENT
Start: 2025-03-19

## 2025-02-27 ASSESSMENT — PAIN SCALES - GENERAL: PAINLEVEL_OUTOF10: 0-NO PAIN

## 2025-02-27 NOTE — PROGRESS NOTES
Patient here for follow up with Kellen VASQUES. Medications and allergies reviewed with patient.     Patient states that she is doing well overall. She has some mild, intermittent hot flashes. She denies pain, nausea, vomiting, diarrhea, constipation, difficulty eating or weight loss.    Labs obtained in office today.  Per orders patient to continue Verzenio and follow up with Dr. Gallego in May. CT and bone scan due prior to appointment. Orders placed for fulvestrant infusions.     Patient verbalized understanding using the teach back method, no further questions at this time.    I have reviewed the labs, imaging and EKG. EKG with NSR HR 71, QTc 436 non-specific ST segment findings, CXR w/o focal consolidations

## 2025-02-27 NOTE — PROGRESS NOTES
" Patient ID: Santy Brody is a 74 y.o. female.  The patient presents to clinic today for her history of metastatic ER+/Her2- breast cancer.     Cancer Staging   Malignant neoplasm of female breast  Staging form: Breast, AJCC 8th Edition  - Pathologic: Stage IV (cM1) - Unsigned    Diagnostic/Therapeutic History:  - She presented with breast cancer presented in 1991 with a right-sided breast lesion. She had a local recurrence in 1998 in the axilla measuring 3 cm.  Her-2/smith was positive as was hormone receptors. It was never clear if this was breast tissue or seamus tissue is her recurrence. Biopsies showed metastatic disease from the left iliac bone on February 12, 2013. It was consistent with breast carcinoma  with estrogen and progesterone receptors positive and HER-2/smith negative. It also appears that PITER-3 is positive as well.   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 A T-cell gene receptor arrangement study did suggest a clone, and a bone marrow biopsy was negative. Her staging studies were otherwise negative.   In the context of evaluating for metastatic disease, she underwent a PET scan where 2 areas in the colon. She has since undergone a colonoscopy, which was abnormal in 2 separate areas. Biopsy proven disease now exist in the proximal ascending colon with  a moderately differentiated adenocarcinoma as well as the sigmoid. KRAS assessment on the tumor has not been done. She has now had a definitive resection.  ERBB2 G, Missense\" class=\"variant-details ellipsis\" _[id number]=\"\"L869R, 2606T>G, Missense   PIK3CA A, Missense\" class=\"variant-details ellipsis\" _[id number]=\"\"E545K, 1633G>A, Missense   DNMT3A K468fs*1, 1402_1415delAAGGAGATTATTGA, Frameshift   TET2 E9136pn*19, 3353delA, Frameshift.    Treatment History:    1991-0-0: Cancer Related Surgery: Right mastectomy and axillary node evaluation in 1991 1991-0-0: Disease " Assessment- Breast: Initial right-sided breast cancer diagnosed in 1991, the pathology of which, I have not been able to obtain. In 1998 she had  right axillary recurrence with 3 cm worth of tumor noted.  It was unclear if margins were  obtained or if was axiallry only tissue  1991-0-0: Chemotherapy: Six cycles of Adriamycin based chemotherapy as adjuvant      treatment in 1991.     1998-0-0: Radiation Therapy: Right axillary versus right upper quadrant base re-occurrence,      status post resection and right chest wall radiation in 1998.     1998-0-0: Cancer Related Surgery: 1998 Right axillary recurrence-3cm  2002-0-0: Hormonal Therapy: Tamoxifen was taken from 1998 to 2002 2002-7-1: New Treatment Plan: Anastrazole 2002-2008 2006-0-0: Radiation Therapy: Localized  XRT to RUE after resection of Tcell NHL  2006-12-0: Cancer Related Surgery: RUE subcutaneous mass c/w peripheral T cell NHL  2014-2-7: Disease Assessment-colon: AFter PET+ findings a colonoscopy revealed dz in 2 separate areas.  Biopsy proven  disease now exist in the proximal ascending colon with a moderately  differentiated adenocarcinoma as well as the sigmoid.  KRAS   has not been done.     2014-2-12: First Relapse Date-systemic: left iliac bone on February 12, 2013.  It was  consistent with breast carcinoma with estrogen and progesterone  receptors positive and HER-2/smith negative.  2014-3-20: Chemotherapy: Capecitabine 50 mg b.i.d. status post 2 cycles, her last March 20, 2014  2014-3-28: New Treatment Plan: Abraxane 260 mg/m2  2014-7-28: New Follow-up Plan: Abraxane stopped.  Exemestane initiated  2014-9-4: Cancer Related Surgery: R hemicolectomy  2016-2-19: New Treatment Plan: Exemestane stopped/ letrozole initiated  2016-3-4: Miscellaneous: Palbociclib initiated  2022-1-5: New Treatment Plan: Letrozole/Ibrance stopped with new bone disease noted  2022-1-20: Chemotherapy: fulvestrant start  2022-2-3: Chemotherapy: Abemiciclib start    History of  "Present Illness (HPI)/Interval History:  Ms. Brody presents today for routine FUV.  Denies sinus pain, fever/chills, sore throat.  Compliant with 50 mg BID dose of Verzenio, very rarely forgets.   Diarrhea is mainly resolved, unless eats something that \"doesn't agree with her.\"   She does have some mild muscle soreness in her left upper arm, will be staring PT soon. Thought to be a pulled muscle.   Neuropathy still present in hands and feet, still bothersome. Taking gabapentin to take the edge off.   She continues feeling well. She takes care of her mother who is 92 years old.    Review of Systems:  14-point ROS otherwise negative, as per HPI.      Allergies   Allergen Reactions    Denosumab Unknown and Other     Xgeva: Adverse Reaction: Osteoporosis with fracture    Should avoid all Biphosphonates       Current Outpatient Medications   Medication Instructions    abemaciclib (VERZENIO) 50 mg, oral, 2 times daily, Swallow whole.    calcium carbonate-vitamin D3 600 mg-5 mcg (200 unit) tablet 1 tablet, 2 times daily    cranberry extract 200 mg capsule Take by mouth.    fenofibrate (Tricor) 54 mg tablet TAKE 1 TABLET (54 MG) BY MOUTH ONCE DAILY IN THE MORNING. TAKE BEFORE A MEAL    fluticasone (Flonase) 50 mcg/actuation nasal spray 2 sprays, Daily    furosemide (LASIX) 20 mg, oral, Daily    gabapentin (NEURONTIN) 100 mg, oral, Nightly    lisinopril 10 mg, oral, Daily    omega-3/dha/epa/fish oil (OMEGA-3 ORAL) Take by mouth. Omega 3 EPA + DHA 1000 MG Oral Capsule; Take 2 capsules by mouth twice daily.    TURMERIC ORAL No dose, route, or frequency recorded.         Objective    BSA: There is no height or weight on file to calculate BSA.  There were no vitals taken for this visit.    Performance Status:  (0) Fully active, able to carry on all predisease performance without restriction    Physical Exam  Vitals reviewed.   Constitutional:       General: She is not in acute distress.     Appearance: She is not " toxic-appearing.   HENT:      Mouth/Throat:      Mouth: Mucous membranes are moist.      Pharynx: Oropharynx is clear.   Eyes:      General: No scleral icterus.     Extraocular Movements: Extraocular movements intact.      Conjunctiva/sclera: Conjunctivae normal.   Cardiovascular:      Rate and Rhythm: Normal rate and regular rhythm.   Pulmonary:      Effort: Pulmonary effort is normal. No respiratory distress.   Musculoskeletal:         General: No swelling or tenderness.   Skin:     General: Skin is warm and dry.      Coloration: Skin is not jaundiced.   Neurological:      General: No focal deficit present.      Mental Status: She is alert and oriented to person, place, and time.   Psychiatric:         Mood and Affect: Mood normal.         Behavior: Behavior normal.         Thought Content: Thought content normal.         Judgment: Judgment normal.            Laboratory Data:  Lab Results   Component Value Date    WBC 2.5 (L) 09/03/2024    HGB 9.1 (L) 09/03/2024    HCT 29.3 (L) 09/03/2024    MCV 74 (L) 09/03/2024     09/03/2024    ANC 0.94 (L) 12/27/2023       Chemistry    Lab Results   Component Value Date/Time     (L) 02/18/2025 1614    K 4.2 02/18/2025 1614    CL 96 (L) 02/18/2025 1614    CO2 29 02/18/2025 1614    BUN 19 02/18/2025 1614    CREATININE 1.10 (H) 02/18/2025 1614    Lab Results   Component Value Date/Time    CALCIUM 9.7 02/18/2025 1614    ALKPHOS 49 02/18/2025 1614    AST 21 02/18/2025 1614    ALT 12 02/18/2025 1614    BILITOT 0.9 02/18/2025 1614           Lab Results   Component Value Date    IRON 173 (H) 05/15/2024    TIBC 414 05/15/2024    FERRITIN 1,291 (H) 09/03/2024     Radiology:  I personally reviewed the images with patient. Radiology report reviewed and listed below for reference.    8/28/24 NM bone whole body  IMPRESSION: 1. No focal increased radiotracer uptake involving right sacral ala lower lumbar vertebrae, consistent with treated metastatic disease.  2. No new foci of  radiotracer uptake to suggest new bone metastasis.  3. Interval decrease in radiotracer uptake involving L2 vertebral  body, as compared to prior bone scan dated 09/05/2023, corresponding to a compression fracture on CT.  4. Contiguous foci of radiotracer uptake involving left 4th, 5th and  6th ribs anteriorly, likely due to recent fractures.    8/28/24 CT chest abdomen pelvis w IV contrast  Impression: CHEST:  1. Status post right breast surgery/reconstruction with no definite  soft tissue masses in the chest wall.  2. No enlarged intrathoracic lymphadenopathy.  3. Stable pulmonary findings with a scattered bilateral (right-sided  predominance) areas of scarring and atelectasis. Stable predominantly  right-sided nodular density/ill-defined pulmonary nodules which did  not show FDG uptake in prior PET scan and possibly  inflammatory/atelectasis, would warrant continued attention on  follow-up. No new suspicious pulmonary nodules or masses.  4. Cardiomegaly. Severe coronary artery calcifications.      ABDOMEN-PELVIS:  1. Status post right hemicolectomy with ileocolonic anastomosis. No  complications.  2. No metastatic disease in the abdomen or pelvis.      Osseous structures:  1.   Stable superior endplate compression deformity of T12 and L2  vertebral bodies. Old left 4th-6th rib anterior fractures. Please  refer to same day bone scan official read for further details.     Assessment/Plan:  Santy Brody is a 74 y.o. female with a history of metastatic (lung and bone involvement) breast cancer, who presents today follow-up evaluation on fulvestrant and abemaciclib.    1. Stage IV breast cancer: She has lung and bone involvement.  - Currently on fulvestrant and abemaciclib, tolerating well   - Continue abemaciclib 50 mg BID (dose-reduced for anemia).  - Grade 1 diarrhea: with 50 mg BID has not been an issue  - Grade 1 neuropathy: continue gabapentin / referral to pain management for scrambler therapy consideration   -  Next CT CAP and bone scan ordered for now, will call with results. Pending these continue with treatment as planned or see sooner with Dr. Gallego  - Labs checked today CBC, CBC and CA27.29     Genomics for liquid bx (Foundation 1) 1/2022.  1. PIK3A  2. ERBB2  3. MSI- undetermined  4. VUS -- BRCA2 and ESR     2. 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 Hemoglobin baseline 8-9  ---Secondary iron overload. Goal Ferritin 1000-1,500  Ferritin 1,291 on 9/2024, pending today. Also check iron studies today   - Recheck ferritin every 3-6 months for monitoring    3. Bone metastasis, osteopenia:  She is not a candidate for further bisphosphonate therapy.  - Compression fractures (osteoporotic?) noted in spine without active disease in those areas.  - No benefit to radiation in this case.  - Discussed use of Forteo as an option for bone health, but as this is a daily injection, she would like to avoid for now. If recurrent pain, could consider kyphoplasty.     4. Colon cancer:  No disease activity suspected. Most recent CEA is normal.  Colonoscopy 4/2024: negative for polyps     5. Lymphedema:  Stable. Encouraged compression sleeve use. Overall stable.    6. Peripheral T Cell Lymphoma  Enlarged lymph node, biopsy taken 12/19/2005   -check CBC, LDH, check for adenopathy on scans     Next visit in May 2025 with Dr. Julián Gamino sooner pending scans    Kellen Lamb PA-C  Medical Oncology  [facility]

## 2025-03-10 RX ORDER — EPINEPHRINE 0.3 MG/.3ML
0.3 INJECTION SUBCUTANEOUS EVERY 5 MIN PRN
OUTPATIENT
Start: 2025-03-11

## 2025-03-10 RX ORDER — ALBUTEROL SULFATE 0.83 MG/ML
3 SOLUTION RESPIRATORY (INHALATION) AS NEEDED
OUTPATIENT
Start: 2025-03-11

## 2025-03-10 RX ORDER — FAMOTIDINE 10 MG/ML
20 INJECTION, SOLUTION INTRAVENOUS ONCE AS NEEDED
OUTPATIENT
Start: 2025-03-11

## 2025-03-10 RX ORDER — DIPHENHYDRAMINE HYDROCHLORIDE 50 MG/ML
50 INJECTION INTRAMUSCULAR; INTRAVENOUS AS NEEDED
OUTPATIENT
Start: 2025-03-11

## 2025-03-13 ENCOUNTER — TREATMENT (OUTPATIENT)
Dept: PHYSICAL THERAPY | Facility: CLINIC | Age: 75
End: 2025-03-13
Payer: COMMERCIAL

## 2025-03-13 DIAGNOSIS — M25.512 ACUTE PAIN OF LEFT SHOULDER: ICD-10-CM

## 2025-03-13 PROCEDURE — 97110 THERAPEUTIC EXERCISES: CPT | Mod: GP,CQ

## 2025-03-13 NOTE — PROGRESS NOTES
Physical Therapy Treatment    Patient Name: Santy Brody  MRN: [id number]  Today's Date: 3/13/2025  Time Calculation  Start Time: 0200  Stop Time: 0240  Time Calculation (min): 40 min  PT Therapeutic Procedures Time Entry  Therapeutic Exercise Time Entry: 40    Insurance:  Visit number: 2 of 6  Authorization info: 2025: ANTH FED R - NO AUTH / $7500 OOP not met / $35 COPAY / 50V pt/ot/st - 0 used / ds 2/20/25.   Insurance Type: Payor: AXEL / Plan: AXEL HMP / Product Type: *No Product type* /     Current Problem   1. Acute pain of left shoulder  Follow Up In Physical Therapy          Subjective   General    Pt reports some general soreness after last session  Precautions:   0  Pain    1-2  Post Treatment Pain Level 1    Objective   Pt demonstrated good strength with RC musculature with minimal pain.    Treatments:  Therapeutic Exercise:   UBE x 3'  Scap add with L2 Tb x 20   GH extensions with L2 TB x 20   GH IR with L1 TB x 20  Gh ER with L1 TB x 20  GH ADD with L1 TB x 20    Assessment   Assessment:    Pt was able to complete new there ex without any discomfort in L shoulder.    Plan:    Continue to strengthen L Rc musculature    OP EDUCATION:   Updated HEP    Goals:   Active       Mobility       Goal 1       Start:  02/24/25    Expected End:  05/25/25       Pt will improve left sh AROM to WNL to improve I/ADLs.         Goal 2       Start:  02/24/25    Expected End:  05/25/25       Pt will improve left sh strength to 4+/5 to improve I/ADLs            Pain       Goal 1       Start:  02/24/25    Expected End:  05/25/25       Pt will perform all dressing with 0/10 pain and no difficulty         Goal 2       Start:  02/24/25    Expected End:  05/25/25       Pt will perform all household tasks with 0/10 pain

## 2025-03-18 ENCOUNTER — INFUSION (OUTPATIENT)
Dept: HEMATOLOGY/ONCOLOGY | Facility: CLINIC | Age: 75
End: 2025-03-18
Payer: COMMERCIAL

## 2025-03-18 VITALS
RESPIRATION RATE: 18 BRPM | WEIGHT: 164.24 LBS | OXYGEN SATURATION: 97 % | HEART RATE: 80 BPM | BODY MASS INDEX: 32.08 KG/M2 | SYSTOLIC BLOOD PRESSURE: 163 MMHG | DIASTOLIC BLOOD PRESSURE: 97 MMHG | TEMPERATURE: 96.4 F

## 2025-03-18 DIAGNOSIS — C50.919 MALIGNANT NEOPLASM OF FEMALE BREAST, UNSPECIFIED ESTROGEN RECEPTOR STATUS, UNSPECIFIED LATERALITY, UNSPECIFIED SITE OF BREAST: ICD-10-CM

## 2025-03-18 PROCEDURE — 2500000004 HC RX 250 GENERAL PHARMACY W/ HCPCS (ALT 636 FOR OP/ED): Mod: JZ,TB

## 2025-03-18 PROCEDURE — 96402 CHEMO HORMON ANTINEOPL SQ/IM: CPT

## 2025-03-18 RX ORDER — LAMOTRIGINE 25 MG/1
500 TABLET ORAL ONCE
Status: COMPLETED | OUTPATIENT
Start: 2025-03-18 | End: 2025-03-18

## 2025-03-18 RX ORDER — EPINEPHRINE 0.3 MG/.3ML
0.3 INJECTION SUBCUTANEOUS EVERY 5 MIN PRN
Status: DISCONTINUED | OUTPATIENT
Start: 2025-03-18 | End: 2025-03-18 | Stop reason: HOSPADM

## 2025-03-18 RX ORDER — FAMOTIDINE 10 MG/ML
20 INJECTION, SOLUTION INTRAVENOUS ONCE AS NEEDED
Status: DISCONTINUED | OUTPATIENT
Start: 2025-03-18 | End: 2025-03-18 | Stop reason: HOSPADM

## 2025-03-18 RX ORDER — PROCHLORPERAZINE MALEATE 10 MG
10 TABLET ORAL EVERY 6 HOURS PRN
Status: DISCONTINUED | OUTPATIENT
Start: 2025-03-18 | End: 2025-03-18 | Stop reason: HOSPADM

## 2025-03-18 RX ORDER — ALBUTEROL SULFATE 0.83 MG/ML
3 SOLUTION RESPIRATORY (INHALATION) AS NEEDED
Status: DISCONTINUED | OUTPATIENT
Start: 2025-03-18 | End: 2025-03-18 | Stop reason: HOSPADM

## 2025-03-18 RX ORDER — PROCHLORPERAZINE EDISYLATE 5 MG/ML
10 INJECTION INTRAMUSCULAR; INTRAVENOUS EVERY 6 HOURS PRN
Status: DISCONTINUED | OUTPATIENT
Start: 2025-03-18 | End: 2025-03-18 | Stop reason: HOSPADM

## 2025-03-18 RX ORDER — DIPHENHYDRAMINE HYDROCHLORIDE 50 MG/ML
50 INJECTION, SOLUTION INTRAMUSCULAR; INTRAVENOUS AS NEEDED
Status: DISCONTINUED | OUTPATIENT
Start: 2025-03-18 | End: 2025-03-18 | Stop reason: HOSPADM

## 2025-03-18 RX ADMIN — FULVESTRANT 500 MG: 50 INJECTION INTRAMUSCULAR at 12:57

## 2025-03-18 ASSESSMENT — PAIN SCALES - GENERAL: PAINLEVEL_OUTOF10: 4

## 2025-03-18 NOTE — PROGRESS NOTES
"Pt BP elevated - she denies CP, SOB, dizziness or feeling \"off\". States that her BP tends to be high for infusion appts - possibly because \"I'm getting poked\".   "

## 2025-03-19 ENCOUNTER — HOSPITAL ENCOUNTER (OUTPATIENT)
Dept: RADIOLOGY | Facility: HOSPITAL | Age: 75
Discharge: HOME | End: 2025-03-19
Payer: COMMERCIAL

## 2025-03-19 DIAGNOSIS — C79.51 CARCINOMA OF BREAST METASTATIC TO BONE, UNSPECIFIED LATERALITY (MULTI): ICD-10-CM

## 2025-03-19 DIAGNOSIS — C50.919 CARCINOMA OF BREAST METASTATIC TO BONE, UNSPECIFIED LATERALITY: Primary | ICD-10-CM

## 2025-03-19 DIAGNOSIS — C50.919 CARCINOMA OF BREAST METASTATIC TO BONE, UNSPECIFIED LATERALITY (MULTI): ICD-10-CM

## 2025-03-19 DIAGNOSIS — C79.51 CARCINOMA OF BREAST METASTATIC TO BONE, UNSPECIFIED LATERALITY: Primary | ICD-10-CM

## 2025-03-19 DIAGNOSIS — C50.919 MALIGNANT NEOPLASM OF FEMALE BREAST, UNSPECIFIED ESTROGEN RECEPTOR STATUS, UNSPECIFIED LATERALITY, UNSPECIFIED SITE OF BREAST: ICD-10-CM

## 2025-03-19 PROCEDURE — 71260 CT THORAX DX C+: CPT

## 2025-03-19 PROCEDURE — 78306 BONE IMAGING WHOLE BODY: CPT

## 2025-03-19 PROCEDURE — A9503 TC99M MEDRONATE: HCPCS

## 2025-03-19 PROCEDURE — 2550000001 HC RX 255 CONTRASTS

## 2025-03-19 PROCEDURE — 3430000001 HC RX 343 DIAGNOSTIC RADIOPHARMACEUTICALS

## 2025-03-19 RX ORDER — SODIUM CHLORIDE 9 MG/ML
1000 INJECTION, SOLUTION INTRAVENOUS ONCE
Status: SHIPPED | OUTPATIENT
Start: 2025-03-19

## 2025-03-19 RX ADMIN — IOHEXOL 75 ML: 350 INJECTION, SOLUTION INTRAVENOUS at 12:17

## 2025-03-19 RX ADMIN — TECHNETIUM TC 99M MEDRONATE 27 MILLICURIE: 25 INJECTION, POWDER, FOR SOLUTION INTRAVENOUS at 10:57

## 2025-03-19 NOTE — NURSING NOTE
Iv saline lock placed by ct tech.  Pt denies any discomforts.  500ml normal saline infusing in alaris pump.

## 2025-03-21 ENCOUNTER — TELEPHONE (OUTPATIENT)
Dept: HEMATOLOGY/ONCOLOGY | Facility: HOSPITAL | Age: 75
End: 2025-03-21
Payer: COMMERCIAL

## 2025-03-21 DIAGNOSIS — C50.919 MALIGNANT NEOPLASM OF FEMALE BREAST, UNSPECIFIED ESTROGEN RECEPTOR STATUS, UNSPECIFIED LATERALITY, UNSPECIFIED SITE OF BREAST: ICD-10-CM

## 2025-03-21 NOTE — TELEPHONE ENCOUNTER
Called pt to discuss following results:     CT chest abdomen pelvis w IV contrast  Narrative: Interpreted By:  Gabriela Moise,   STUDY:  CT CHEST ABDOMEN PELVIS W IV CONTRAST;  3/19/2025 12:27 pm      INDICATION:  Signs/Symptoms:restaging breast cancer.      ,C50.919 Malignant neoplasm of unspecified site of unspecified female  breast,C50.919 Malignant neoplasm of unspecified site of unspecified  female breast,C79.51 Secondary malignant neoplasm of bone (Multi)      COMPARISON:  Bone scan 03/19/2015  CT chest abdomen pelvis 08/28/2024  PET-CT 03/07/2024      ACCESSION NUMBER(S):  [id number]      ORDERING CLINICIAN:  TODD PEACE      TECHNIQUE:  CT of the chest, abdomen, and pelvis was performed.  Contiguous axial  images were obtained at 3 mm slice thickness through the chest,  abdomen and pelvis. Coronal and sagittal reconstructions at 3 mm  slice thickness were performed. 75 ML of Omnipaque 350 was  administered intravenously without immediate complication.      FINDINGS:  CHEST:      LUNG/PLEURA/LARGE AIRWAYS:  Stable small right pleural effusion. Stable subpleural  nodularity/reticulations and ground-glass opacities, which may  reflect postradiation changes. Additional areas of increased scarring  are seen in the right lung apex. No new or enlarging lung nodule. No  pulmonary edema, or pneumothorax. Trachea and right and left main  bronchi are patent.      VESSELS:  Aorta and pulmonary arteries are normal caliber.  Moderate atherosclerotic calcifications of the aorta.  Moderate coronary artery calcifications.      HEART:  The heart is normal in size.  No pericardial effusion.      MEDIASTINUM AND MAKENZIE:  No mediastinal, hilar or axillary lymphadenopathy.  No significant abnormality of the esophagus.      CHEST WALL AND LOWER NECK:  Similar postsurgical changes in the right breast.  The visualized thyroid gland appears unremarkable.      ABDOMEN:      LIVER:  The liver is normal in size. Stable 10 mm  hypoattenuating lesion in  the right hepatic lobe on series 2, image 80, which photopenic on  prior PET-CT, likely benign. No new liver lesion.      BILE DUCTS:  No biliary dilatation.      GALLBLADDER:  Cholelithiasis without gallbladder wall thickening or distention.      PANCREAS:  The pancreas appears unremarkable without evidence of ductal  dilatation or masses.      SPLEEN:  The spleen is normal in size. A splenule is noted.      ADRENAL GLANDS:  No adrenal nodule or thickening.      KIDNEYS AND URETERS:  The kidneys are normal in size and enhance symmetrically.  No hydroureteronephrosis or nephroureterolithiasis.      PELVIS: Beam hardening artifact from right femur prosthesis limits  evaluation in the pelvis.      BLADDER:  Within normal limits.      REPRODUCTIVE ORGANS:  No pelvic masses.      BOWEL:  The stomach is unremarkable.  The small and large bowel are normal in caliber and demonstrate no  wall thickening. The appendix is not definitely visualized. There is  however no pericecal stranding or fluid.      VESSELS:  Mild atherosclerotic calcifications of the aortoiliac arteries.  The IVC appears normal.  Portal vein, splenic vein, and SMV are patent.      PERITONEUM/RETROPERITONEUM/LYMPH NODES:  No ascites or fluid collection.  No peritoneal nodularity or deposits.  The retroperitoneum is unremarkable.  No abdominopelvic lymphadenopathy is present.      BONE AND SOFT TISSUE:  Interval increased healing of chronic nondisplaced left anterior 4th  through 6th rib fractures. Unchanged mild sclerosis in the left iliac  bone at the site of patient's known metastasis. No soft tissue masses  in the abdominal wall. Right femur penelope and screw fixation partially  visualized.      Impression: Breast cancer, cutaneous T-cell lymphoma, and colon cancer restaging  scan; in comparison to prior CT from August 2024:      No significant interval change with no evidence of new disease in the  chest, abdomen and pelvis.  Stable small right pleural effusion.  Unchanged osseous metastatic disease. Evaluation for osseous tumor  burden to be correlated with concurrent bone scan, reported  separately. Additional chronic and incidental findings as detailed  above.      MACRO:  None      Signed by: Gabriela Moise 3/21/2025 7:33 AM  Dictation workstation:   [id number]         Study Result    Narrative & Impression   Interpreted By:  Portia Pascal and Ohs Zachary   STUDY:  NM BONE WHOLE BODY;  3/19/2025 2:51 pm      INDICATION:  Signs/Symptoms:restaging breast cancer.      ,C50.919 Malignant neoplasm of unspecified site of unspecified female  breast,C50.919 Malignant neoplasm of unspecified site of unspecified  female breast,C79.51 Secondary malignant neoplasm of bone (Multi)      COMPARISON:  Same day CT, whole-body bone scan 08/20/2024 PET scan 03/07/2024.      ACCESSION NUMBER(S):  [id number]      ORDERING CLINICIAN:  TODD PEACE      TECHNIQUE:  DIVISION OF NUCLEAR MEDICINE  BONE SCAN, WHOLE BODY plus REGIONAL VIEWS      The patient received an intravenous dose of  27 mCi of Tc-99m MDP.  Anterior and posterior images of the skeleton from skull vertex to  feet were then acquired.  Additional regional skeletal images were  also obtained.      FINDINGS:  Interval decrease radiotracer uptake within contiguous foci at  costochondral junctions involving the left anterior 4th through 6th  ribs, correlates to nondisplaced fractures on same day CT.      Otherwise, no increased radiotracer uptake corresponding to the  sclerotic lesion in the right sacrum, likely representing treated  bone metastasis. Otherwise no concerning focal radiotracer uptake  throughout the axial and appendicular skeleton to suggest new osseous  metastatic disease.      Expected, excreted activity is noted in the kidneys and bladder.      Increased radiotracer uptake is seen in the bilateral shoulders,  sternoclavicular joints, thoracolumbar spine, bilateral  hands,  bilateral knees and bilateral feet, most consistent with an arthritic  pattern.      IMPRESSION:  1. No increased radiotracer uptake corresponding to the sclerotic  lesion in the right sacrum, likely representing treated bone  metastasis. Otherwise, no concerning focal radiotracer uptake  throughout the axial and appendicular skeleton to suggest new osseous  metastatic disease  2. Interval decrease radiotracer uptake within contiguous foci at  costochondral junctions involving the left anterior 4th through 6th  ribs, correlates to nondisplaced fractures on same day CT.      I personally reviewed the images/study and I agree with the findings  as stated by Dr. Earnest Leung. This study was interpreted at  [facility], [place], [place].      MACRO:  None      Signed by: Portia Pascal 3/20/2025 10:17 AM  Dictation workstation:   [id number]     Overall scans look quite stable to improved. Continue with fulvestrant and verzenio as planned. Follow up in May 2025 with dr. Gallego. No further questions at this time

## 2025-03-24 ENCOUNTER — TELEPHONE (OUTPATIENT)
Dept: PRIMARY CARE | Facility: CLINIC | Age: 75
End: 2025-03-24
Payer: COMMERCIAL

## 2025-03-24 DIAGNOSIS — M79.18 MUSCULOSKELETAL PAIN: ICD-10-CM

## 2025-03-24 RX ORDER — GABAPENTIN 100 MG/1
100 CAPSULE ORAL NIGHTLY
Qty: 90 CAPSULE | Refills: 3 | Status: SHIPPED | OUTPATIENT
Start: 2025-03-24

## 2025-03-26 ENCOUNTER — APPOINTMENT (OUTPATIENT)
Dept: PHYSICAL THERAPY | Facility: CLINIC | Age: 75
End: 2025-03-26
Payer: COMMERCIAL

## 2025-03-27 ENCOUNTER — APPOINTMENT (OUTPATIENT)
Dept: HEMATOLOGY/ONCOLOGY | Facility: CLINIC | Age: 75
End: 2025-03-27
Payer: COMMERCIAL

## 2025-03-28 ENCOUNTER — PATIENT MESSAGE (OUTPATIENT)
Dept: HEMATOLOGY/ONCOLOGY | Facility: CLINIC | Age: 75
End: 2025-03-28
Payer: COMMERCIAL

## 2025-03-28 DIAGNOSIS — C50.919 MALIGNANT NEOPLASM OF BREAST IN FEMALE, ESTROGEN RECEPTOR POSITIVE, UNSPECIFIED LATERALITY, UNSPECIFIED SITE OF BREAST: ICD-10-CM

## 2025-03-28 DIAGNOSIS — Z17.0 MALIGNANT NEOPLASM OF BREAST IN FEMALE, ESTROGEN RECEPTOR POSITIVE, UNSPECIFIED LATERALITY, UNSPECIFIED SITE OF BREAST: ICD-10-CM

## 2025-03-31 ENCOUNTER — APPOINTMENT (OUTPATIENT)
Dept: PHYSICAL THERAPY | Facility: CLINIC | Age: 75
End: 2025-03-31
Payer: COMMERCIAL

## 2025-04-15 PROBLEM — S72.309A FRACTURE OF SHAFT OF FEMUR: Status: ACTIVE | Noted: 2025-04-15

## 2025-04-15 PROBLEM — L01.00 IMPETIGO: Status: ACTIVE | Noted: 2025-04-15

## 2025-04-15 PROBLEM — G25.81 RESTLESS LEGS SYNDROME: Status: ACTIVE | Noted: 2025-04-15

## 2025-04-15 PROBLEM — C18.9 ADENOCARCINOMA OF LARGE INTESTINE: Status: ACTIVE | Noted: 2025-04-15

## 2025-04-15 PROBLEM — R53.83 FATIGUE: Status: ACTIVE | Noted: 2025-04-15

## 2025-04-15 PROBLEM — E66.3 OVERWEIGHT WITH BODY MASS INDEX (BMI) 25.0-29.9: Status: ACTIVE | Noted: 2025-04-15

## 2025-04-15 PROBLEM — L70.9 ACNE: Status: ACTIVE | Noted: 2025-04-15

## 2025-04-15 PROBLEM — R06.02 SHORTNESS OF BREATH: Status: ACTIVE | Noted: 2025-04-15

## 2025-04-15 PROBLEM — R09.81 CONGESTION OF PARANASAL SINUS: Status: ACTIVE | Noted: 2025-04-15

## 2025-04-15 PROBLEM — Z86.79 HISTORY OF HYPERTENSION: Status: ACTIVE | Noted: 2025-04-15

## 2025-04-15 PROBLEM — B02.9 HERPES ZOSTER: Status: ACTIVE | Noted: 2025-04-15

## 2025-04-17 ENCOUNTER — INFUSION (OUTPATIENT)
Dept: HEMATOLOGY/ONCOLOGY | Facility: CLINIC | Age: 75
End: 2025-04-17
Payer: COMMERCIAL

## 2025-04-17 ENCOUNTER — OFFICE VISIT (OUTPATIENT)
Dept: PAIN MEDICINE | Facility: CLINIC | Age: 75
End: 2025-04-17
Payer: COMMERCIAL

## 2025-04-17 VITALS
SYSTOLIC BLOOD PRESSURE: 144 MMHG | RESPIRATION RATE: 22 BRPM | DIASTOLIC BLOOD PRESSURE: 70 MMHG | OXYGEN SATURATION: 99 % | WEIGHT: 163 LBS | HEIGHT: 61 IN | HEART RATE: 69 BPM | BODY MASS INDEX: 30.78 KG/M2

## 2025-04-17 VITALS
RESPIRATION RATE: 18 BRPM | WEIGHT: 158.84 LBS | HEART RATE: 84 BPM | BODY MASS INDEX: 30.01 KG/M2 | OXYGEN SATURATION: 95 % | DIASTOLIC BLOOD PRESSURE: 89 MMHG | TEMPERATURE: 96.3 F | SYSTOLIC BLOOD PRESSURE: 147 MMHG

## 2025-04-17 DIAGNOSIS — C50.919 MALIGNANT NEOPLASM OF FEMALE BREAST, UNSPECIFIED ESTROGEN RECEPTOR STATUS, UNSPECIFIED LATERALITY, UNSPECIFIED SITE OF BREAST: ICD-10-CM

## 2025-04-17 DIAGNOSIS — C50.919 MALIGNANT NEOPLASM OF BREAST IN FEMALE, ESTROGEN RECEPTOR POSITIVE, UNSPECIFIED LATERALITY, UNSPECIFIED SITE OF BREAST: Primary | ICD-10-CM

## 2025-04-17 DIAGNOSIS — Z17.0 MALIGNANT NEOPLASM OF BREAST IN FEMALE, ESTROGEN RECEPTOR POSITIVE, UNSPECIFIED LATERALITY, UNSPECIFIED SITE OF BREAST: Primary | ICD-10-CM

## 2025-04-17 DIAGNOSIS — G62.0 CHEMOTHERAPY-INDUCED PERIPHERAL NEUROPATHY (MULTI): ICD-10-CM

## 2025-04-17 DIAGNOSIS — G62.9 PERIPHERAL POLYNEUROPATHY: ICD-10-CM

## 2025-04-17 DIAGNOSIS — T45.1X5A CHEMOTHERAPY-INDUCED PERIPHERAL NEUROPATHY (MULTI): ICD-10-CM

## 2025-04-17 PROCEDURE — 3078F DIAST BP <80 MM HG: CPT | Performed by: ANESTHESIOLOGY

## 2025-04-17 PROCEDURE — 1159F MED LIST DOCD IN RCRD: CPT | Performed by: ANESTHESIOLOGY

## 2025-04-17 PROCEDURE — 1123F ACP DISCUSS/DSCN MKR DOCD: CPT | Performed by: ANESTHESIOLOGY

## 2025-04-17 PROCEDURE — 3008F BODY MASS INDEX DOCD: CPT | Performed by: ANESTHESIOLOGY

## 2025-04-17 PROCEDURE — 96402 CHEMO HORMON ANTINEOPL SQ/IM: CPT

## 2025-04-17 PROCEDURE — 99204 OFFICE O/P NEW MOD 45 MIN: CPT | Performed by: ANESTHESIOLOGY

## 2025-04-17 PROCEDURE — 2500000004 HC RX 250 GENERAL PHARMACY W/ HCPCS (ALT 636 FOR OP/ED): Mod: JZ,TB

## 2025-04-17 PROCEDURE — 1160F RVW MEDS BY RX/DR IN RCRD: CPT | Performed by: ANESTHESIOLOGY

## 2025-04-17 PROCEDURE — 1036F TOBACCO NON-USER: CPT | Performed by: ANESTHESIOLOGY

## 2025-04-17 PROCEDURE — 3077F SYST BP >= 140 MM HG: CPT | Performed by: ANESTHESIOLOGY

## 2025-04-17 PROCEDURE — 1125F AMNT PAIN NOTED PAIN PRSNT: CPT | Performed by: ANESTHESIOLOGY

## 2025-04-17 PROCEDURE — 99214 OFFICE O/P EST MOD 30 MIN: CPT | Performed by: ANESTHESIOLOGY

## 2025-04-17 RX ORDER — EPINEPHRINE 0.3 MG/.3ML
0.3 INJECTION SUBCUTANEOUS EVERY 5 MIN PRN
Status: DISCONTINUED | OUTPATIENT
Start: 2025-04-17 | End: 2025-04-17 | Stop reason: HOSPADM

## 2025-04-17 RX ORDER — LAMOTRIGINE 25 MG/1
500 TABLET ORAL ONCE
Status: COMPLETED | OUTPATIENT
Start: 2025-04-17 | End: 2025-04-17

## 2025-04-17 RX ORDER — DIPHENHYDRAMINE HYDROCHLORIDE 50 MG/ML
50 INJECTION, SOLUTION INTRAMUSCULAR; INTRAVENOUS AS NEEDED
Status: DISCONTINUED | OUTPATIENT
Start: 2025-04-17 | End: 2025-04-17 | Stop reason: HOSPADM

## 2025-04-17 RX ORDER — PROCHLORPERAZINE EDISYLATE 5 MG/ML
10 INJECTION INTRAMUSCULAR; INTRAVENOUS EVERY 6 HOURS PRN
Status: DISCONTINUED | OUTPATIENT
Start: 2025-04-17 | End: 2025-04-17 | Stop reason: HOSPADM

## 2025-04-17 RX ORDER — FAMOTIDINE 10 MG/ML
20 INJECTION, SOLUTION INTRAVENOUS ONCE AS NEEDED
Status: DISCONTINUED | OUTPATIENT
Start: 2025-04-17 | End: 2025-04-17 | Stop reason: HOSPADM

## 2025-04-17 RX ORDER — PROCHLORPERAZINE MALEATE 10 MG
10 TABLET ORAL EVERY 6 HOURS PRN
Status: DISCONTINUED | OUTPATIENT
Start: 2025-04-17 | End: 2025-04-17 | Stop reason: HOSPADM

## 2025-04-17 RX ORDER — ALBUTEROL SULFATE 0.83 MG/ML
3 SOLUTION RESPIRATORY (INHALATION) AS NEEDED
Status: DISCONTINUED | OUTPATIENT
Start: 2025-04-17 | End: 2025-04-17 | Stop reason: HOSPADM

## 2025-04-17 RX ADMIN — FULVESTRANT 500 MG: 50 INJECTION INTRAMUSCULAR at 13:43

## 2025-04-17 ASSESSMENT — LIFESTYLE VARIABLES
AUDIT-C TOTAL SCORE: 0
HOW OFTEN DO YOU HAVE A DRINK CONTAINING ALCOHOL: NEVER
HOW OFTEN DO YOU HAVE SIX OR MORE DRINKS ON ONE OCCASION: NEVER
SKIP TO QUESTIONS 9-10: 1
HOW MANY STANDARD DRINKS CONTAINING ALCOHOL DO YOU HAVE ON A TYPICAL DAY: PATIENT DOES NOT DRINK

## 2025-04-17 ASSESSMENT — PATIENT HEALTH QUESTIONNAIRE - PHQ9
2. FEELING DOWN, DEPRESSED OR HOPELESS: NOT AT ALL
1. LITTLE INTEREST OR PLEASURE IN DOING THINGS: NOT AT ALL
SUM OF ALL RESPONSES TO PHQ9 QUESTIONS 1 & 2: 0

## 2025-04-17 ASSESSMENT — ENCOUNTER SYMPTOMS
CONSTITUTIONAL NEGATIVE: 1
NUMBNESS: 1
ALLERGIC/IMMUNOLOGIC NEGATIVE: 1
HEMATOLOGIC/LYMPHATIC NEGATIVE: 1
RESPIRATORY NEGATIVE: 1
GASTROINTESTINAL NEGATIVE: 1
PSYCHIATRIC NEGATIVE: 1
ENDOCRINE NEGATIVE: 1
CARDIOVASCULAR NEGATIVE: 1
MUSCULOSKELETAL NEGATIVE: 1
EYES NEGATIVE: 1

## 2025-04-17 ASSESSMENT — PAIN DESCRIPTION - DESCRIPTORS: DESCRIPTORS: BURNING;NUMBNESS;TINGLING

## 2025-04-17 ASSESSMENT — PAIN SCALES - GENERAL
PAINLEVEL_OUTOF10: 8

## 2025-04-17 ASSESSMENT — PAIN - FUNCTIONAL ASSESSMENT: PAIN_FUNCTIONAL_ASSESSMENT: 0-10

## 2025-04-17 NOTE — PROGRESS NOTES
Pt states she is having pain in bilateral feet - believes its a combo of arthritis & neuropathy. She said a few weeks ago both feet were swelling & very painful. The swelling has completely resolved in left & almost resolved in right. She states they look more discolored & brown and are still painful at night. She saw a pain medicine doc today. Made Kellen THEODORE aware of this - she states it's ok to proceed with treatment today. She wants pt to monitor her swelling / elevate legs / use compression stockings & make us aware if the swelling comes back again. Pt educated on all of this information, she's understanding & uses teach back method.

## 2025-04-17 NOTE — PROGRESS NOTES
Subjective   Patient ID: Santy Brody is a 74 y.o. female who presents for Neuralgia.  HPI  Patient is here today for new patient evaluation of her CIPN.  She was diagnosed with breast Ca in 1991 and underwent chemo and Xrt and was cancer free for 20 years.  Then in 2014 she had cancer again as colon CA. she then underwent additional chemotherapy agents which worsen neuropathy in her feet.    Her symptoms are in her feet.  She has pain in the bottoms of both of her feet.  She has burning, tingling, and numbness in her feet.  She can have a shooting pain in her ankles as well.  Pain will keep her up at night.  She has changes in her pain throughout the day which can be very uncomfortable for her.  She was referred here today to discuss [place] scrambler therapy as a potential treatment option for her.    She is takign gabapentin at night 100 mg for awhile.  She has taken more which has been helpful.      Pain Score:   8     Review of Systems   Constitutional: Negative.    HENT: Negative.     Eyes: Negative.    Respiratory: Negative.     Cardiovascular: Negative.    Gastrointestinal: Negative.    Endocrine: Negative.    Genitourinary: Negative.    Musculoskeletal: Negative.    Skin: Negative.    Allergic/Immunologic: Negative.    Neurological:  Positive for numbness.   Hematological: Negative.    Psychiatric/Behavioral: Negative.         Objective   Physical Exam  Vitals and nursing note reviewed.   Constitutional:       Appearance: Normal appearance.   HENT:      Head: Normocephalic and atraumatic.      Right Ear: Ear canal and external ear normal.      Left Ear: Ear canal and external ear normal.      Nose: Nose normal.      Mouth/Throat:      Mouth: Mucous membranes are moist.      Pharynx: Oropharynx is clear.   Eyes:      Conjunctiva/sclera: Conjunctivae normal.      Pupils: Pupils are equal, round, and reactive to light.   Cardiovascular:      Rate and Rhythm: Normal rate.   Pulmonary:      Effort: Pulmonary  effort is normal. No respiratory distress.   Musculoskeletal:      Cervical back: Normal range of motion and neck supple.      Lumbar back: Tenderness present. Normal range of motion.   Skin:     General: Skin is warm and dry.   Neurological:      Mental Status: She is alert and oriented to person, place, and time.      Sensory: Sensory deficit (BL feet) present.      Motor: Motor function is intact.      Coordination: Coordination is intact.      Gait: Gait is intact.   Psychiatric:         Mood and Affect: Mood normal.         Thought Content: Thought content normal.         Assessment/Plan   Problem List Items Addressed This Visit           ICD-10-CM    Malignant neoplasm of female breast - Primary C50.919     Other Visit Diagnoses         Codes      Peripheral polyneuropathy     G62.9      Chemotherapy-induced peripheral neuropathy (Multi)     G62.0, T45.1X5A          I nice discussion with the patient today our plan will be as follows.    Radiology: All available imaging was reviewed today.    Physically: Patient should continue with home exercise program unrestricted.    Psychologically: No issues at this time.    Medication: The patient will need to wean off of her gabapentin at her current dosing she can stop that dose 3 days before starting scrambler therapy.    Duration: Greater than 5 years.    Intervention: Patient is an excellent candidate for scrambler therapy for the treatment of her chemotherapy-induced neuropathy.  Risks, benefit, and alternatives of the procedure were discussed with the patient.  Oswestry score has been compelted and recorded.    Dermatome mapping:  Bilateral lower extremity L5 and S1        Please note that this report has been produced using speech recognition software. It may contain errors related to grammar, punctuation or spelling. Electronically signed, but not reviewed.          Emery Mckinley MD 04/17/25 11:05 AM

## 2025-05-15 ENCOUNTER — APPOINTMENT (OUTPATIENT)
Dept: HEMATOLOGY/ONCOLOGY | Facility: CLINIC | Age: 75
End: 2025-05-15
Payer: COMMERCIAL

## 2025-05-15 ENCOUNTER — INFUSION (OUTPATIENT)
Dept: HEMATOLOGY/ONCOLOGY | Facility: CLINIC | Age: 75
End: 2025-05-15
Payer: COMMERCIAL

## 2025-05-15 VITALS
TEMPERATURE: 96.1 F | BODY MASS INDEX: 30.24 KG/M2 | RESPIRATION RATE: 18 BRPM | SYSTOLIC BLOOD PRESSURE: 179 MMHG | HEART RATE: 107 BPM | WEIGHT: 160.05 LBS | DIASTOLIC BLOOD PRESSURE: 92 MMHG | OXYGEN SATURATION: 96 %

## 2025-05-15 DIAGNOSIS — C50.919 MALIGNANT NEOPLASM OF FEMALE BREAST, UNSPECIFIED ESTROGEN RECEPTOR STATUS, UNSPECIFIED LATERALITY, UNSPECIFIED SITE OF BREAST: ICD-10-CM

## 2025-05-15 DIAGNOSIS — C50.919 MALIGNANT NEOPLASM OF FEMALE BREAST, UNSPECIFIED ESTROGEN RECEPTOR STATUS, UNSPECIFIED LATERALITY, UNSPECIFIED SITE OF BREAST: Primary | ICD-10-CM

## 2025-05-15 PROCEDURE — 2500000004 HC RX 250 GENERAL PHARMACY W/ HCPCS (ALT 636 FOR OP/ED): Mod: JZ,TB | Performed by: INTERNAL MEDICINE

## 2025-05-15 PROCEDURE — 96402 CHEMO HORMON ANTINEOPL SQ/IM: CPT

## 2025-05-15 RX ORDER — FAMOTIDINE 10 MG/ML
20 INJECTION, SOLUTION INTRAVENOUS ONCE AS NEEDED
Status: CANCELLED | OUTPATIENT
Start: 2025-05-15

## 2025-05-15 RX ORDER — LAMOTRIGINE 25 MG/1
500 TABLET ORAL ONCE
OUTPATIENT
Start: 2025-06-11

## 2025-05-15 RX ORDER — PROCHLORPERAZINE EDISYLATE 5 MG/ML
10 INJECTION INTRAMUSCULAR; INTRAVENOUS EVERY 6 HOURS PRN
Status: CANCELLED | OUTPATIENT
Start: 2025-05-15

## 2025-05-15 RX ORDER — PROCHLORPERAZINE EDISYLATE 5 MG/ML
10 INJECTION INTRAMUSCULAR; INTRAVENOUS EVERY 6 HOURS PRN
Status: DISCONTINUED | OUTPATIENT
Start: 2025-05-15 | End: 2025-05-15 | Stop reason: HOSPADM

## 2025-05-15 RX ORDER — EPINEPHRINE 0.3 MG/.3ML
0.3 INJECTION SUBCUTANEOUS EVERY 5 MIN PRN
Status: CANCELLED | OUTPATIENT
Start: 2025-05-15

## 2025-05-15 RX ORDER — DIPHENHYDRAMINE HYDROCHLORIDE 50 MG/ML
50 INJECTION, SOLUTION INTRAMUSCULAR; INTRAVENOUS AS NEEDED
Status: CANCELLED | OUTPATIENT
Start: 2025-05-15

## 2025-05-15 RX ORDER — PROCHLORPERAZINE MALEATE 10 MG
10 TABLET ORAL EVERY 6 HOURS PRN
Status: CANCELLED | OUTPATIENT
Start: 2025-05-15

## 2025-05-15 RX ORDER — FAMOTIDINE 10 MG/ML
20 INJECTION, SOLUTION INTRAVENOUS ONCE AS NEEDED
OUTPATIENT
Start: 2025-06-11

## 2025-05-15 RX ORDER — LAMOTRIGINE 25 MG/1
500 TABLET ORAL ONCE
Status: COMPLETED | OUTPATIENT
Start: 2025-05-15 | End: 2025-05-15

## 2025-05-15 RX ORDER — EPINEPHRINE 0.3 MG/.3ML
0.3 INJECTION SUBCUTANEOUS EVERY 5 MIN PRN
Status: DISCONTINUED | OUTPATIENT
Start: 2025-05-15 | End: 2025-05-15 | Stop reason: HOSPADM

## 2025-05-15 RX ORDER — DIPHENHYDRAMINE HYDROCHLORIDE 50 MG/ML
50 INJECTION, SOLUTION INTRAMUSCULAR; INTRAVENOUS AS NEEDED
Status: DISCONTINUED | OUTPATIENT
Start: 2025-05-15 | End: 2025-05-15 | Stop reason: HOSPADM

## 2025-05-15 RX ORDER — EPINEPHRINE 0.3 MG/.3ML
0.3 INJECTION SUBCUTANEOUS EVERY 5 MIN PRN
OUTPATIENT
Start: 2025-06-11

## 2025-05-15 RX ORDER — ALBUTEROL SULFATE 0.83 MG/ML
3 SOLUTION RESPIRATORY (INHALATION) AS NEEDED
Status: CANCELLED | OUTPATIENT
Start: 2025-05-15

## 2025-05-15 RX ORDER — FAMOTIDINE 10 MG/ML
20 INJECTION, SOLUTION INTRAVENOUS ONCE AS NEEDED
Status: DISCONTINUED | OUTPATIENT
Start: 2025-05-15 | End: 2025-05-15 | Stop reason: HOSPADM

## 2025-05-15 RX ORDER — ALBUTEROL SULFATE 0.83 MG/ML
3 SOLUTION RESPIRATORY (INHALATION) AS NEEDED
Status: DISCONTINUED | OUTPATIENT
Start: 2025-05-15 | End: 2025-05-15 | Stop reason: HOSPADM

## 2025-05-15 RX ORDER — PROCHLORPERAZINE EDISYLATE 5 MG/ML
10 INJECTION INTRAMUSCULAR; INTRAVENOUS EVERY 6 HOURS PRN
OUTPATIENT
Start: 2025-06-11

## 2025-05-15 RX ORDER — PROCHLORPERAZINE MALEATE 10 MG
10 TABLET ORAL EVERY 6 HOURS PRN
Status: DISCONTINUED | OUTPATIENT
Start: 2025-05-15 | End: 2025-05-15 | Stop reason: HOSPADM

## 2025-05-15 RX ORDER — ALBUTEROL SULFATE 0.83 MG/ML
3 SOLUTION RESPIRATORY (INHALATION) AS NEEDED
OUTPATIENT
Start: 2025-06-11

## 2025-05-15 RX ORDER — PROCHLORPERAZINE MALEATE 10 MG
10 TABLET ORAL EVERY 6 HOURS PRN
OUTPATIENT
Start: 2025-06-11

## 2025-05-15 RX ORDER — DIPHENHYDRAMINE HYDROCHLORIDE 50 MG/ML
50 INJECTION, SOLUTION INTRAMUSCULAR; INTRAVENOUS AS NEEDED
OUTPATIENT
Start: 2025-06-11

## 2025-05-15 RX ORDER — LAMOTRIGINE 25 MG/1
500 TABLET ORAL ONCE
Status: CANCELLED | OUTPATIENT
Start: 2025-05-15

## 2025-05-15 RX ADMIN — FULVESTRANT 500 MG: 50 INJECTION, SOLUTION INTRAMUSCULAR at 13:40

## 2025-05-15 ASSESSMENT — PAIN SCALES - GENERAL: PAINLEVEL_OUTOF10: 0-NO PAIN

## 2025-05-29 ENCOUNTER — APPOINTMENT (OUTPATIENT)
Dept: HEMATOLOGY/ONCOLOGY | Facility: CLINIC | Age: 75
End: 2025-05-29
Payer: COMMERCIAL

## 2025-06-12 ENCOUNTER — APPOINTMENT (OUTPATIENT)
Dept: HEMATOLOGY/ONCOLOGY | Facility: CLINIC | Age: 75
End: 2025-06-12
Payer: COMMERCIAL

## 2025-06-12 ENCOUNTER — INFUSION (OUTPATIENT)
Dept: HEMATOLOGY/ONCOLOGY | Facility: CLINIC | Age: 75
End: 2025-06-12
Payer: COMMERCIAL

## 2025-06-12 ENCOUNTER — LAB (OUTPATIENT)
Dept: LAB | Facility: CLINIC | Age: 75
End: 2025-06-12
Payer: COMMERCIAL

## 2025-06-12 VITALS
HEART RATE: 88 BPM | OXYGEN SATURATION: 97 % | WEIGHT: 159.39 LBS | TEMPERATURE: 97.2 F | SYSTOLIC BLOOD PRESSURE: 143 MMHG | RESPIRATION RATE: 18 BRPM | BODY MASS INDEX: 30.12 KG/M2 | DIASTOLIC BLOOD PRESSURE: 89 MMHG

## 2025-06-12 DIAGNOSIS — C50.919 MALIGNANT NEOPLASM OF FEMALE BREAST, UNSPECIFIED ESTROGEN RECEPTOR STATUS, UNSPECIFIED LATERALITY, UNSPECIFIED SITE OF BREAST: ICD-10-CM

## 2025-06-12 DIAGNOSIS — C50.919 MALIGNANT NEOPLASM OF FEMALE BREAST, UNSPECIFIED ESTROGEN RECEPTOR STATUS, UNSPECIFIED LATERALITY, UNSPECIFIED SITE OF BREAST: Primary | ICD-10-CM

## 2025-06-12 LAB
ALBUMIN SERPL BCP-MCNC: 4.3 G/DL (ref 3.4–5)
ALP SERPL-CCNC: 54 U/L (ref 33–136)
ALT SERPL W P-5'-P-CCNC: 10 U/L (ref 7–45)
ANION GAP SERPL CALC-SCNC: 14 MMOL/L (ref 10–20)
AST SERPL W P-5'-P-CCNC: 19 U/L (ref 9–39)
BASOPHILS # BLD AUTO: 0.04 X10*3/UL (ref 0–0.1)
BASOPHILS NFR BLD AUTO: 1.2 %
BILIRUB SERPL-MCNC: 0.6 MG/DL (ref 0–1.2)
BUN SERPL-MCNC: 28 MG/DL (ref 6–23)
CALCIUM SERPL-MCNC: 9.6 MG/DL (ref 8.6–10.3)
CANCER AG27-29 SERPL-ACNC: 188 U/ML (ref 0–38.6)
CHLORIDE SERPL-SCNC: 98 MMOL/L (ref 98–107)
CO2 SERPL-SCNC: 27 MMOL/L (ref 21–32)
CREAT SERPL-MCNC: 1.29 MG/DL (ref 0.5–1.05)
EGFRCR SERPLBLD CKD-EPI 2021: 44 ML/MIN/1.73M*2
EOSINOPHIL # BLD AUTO: 0.06 X10*3/UL (ref 0–0.4)
EOSINOPHIL NFR BLD AUTO: 1.8 %
ERYTHROCYTE [DISTWIDTH] IN BLOOD BY AUTOMATED COUNT: 15.8 % (ref 11.5–14.5)
FERRITIN SERPL-MCNC: 1442 NG/ML (ref 8–150)
FOLATE SERPL-MCNC: 12.1 NG/ML
GLUCOSE SERPL-MCNC: 120 MG/DL (ref 74–99)
HCT VFR BLD AUTO: 29.1 % (ref 36–46)
HGB BLD-MCNC: 9.2 G/DL (ref 12–16)
IMM GRANULOCYTES # BLD AUTO: 0.02 X10*3/UL (ref 0–0.5)
IMM GRANULOCYTES NFR BLD AUTO: 0.6 % (ref 0–0.9)
IRON SATN MFR SERPL: 41 % (ref 25–45)
IRON SERPL-MCNC: 166 UG/DL (ref 35–150)
LYMPHOCYTES # BLD AUTO: 1.15 X10*3/UL (ref 0.8–3)
LYMPHOCYTES NFR BLD AUTO: 34.3 %
MCH RBC QN AUTO: 22.5 PG (ref 26–34)
MCHC RBC AUTO-ENTMCNC: 31.6 G/DL (ref 32–36)
MCV RBC AUTO: 71 FL (ref 80–100)
MONOCYTES # BLD AUTO: 0.37 X10*3/UL (ref 0.05–0.8)
MONOCYTES NFR BLD AUTO: 11 %
NEUTROPHILS # BLD AUTO: 1.71 X10*3/UL (ref 1.6–5.5)
NEUTROPHILS NFR BLD AUTO: 51.1 %
NRBC BLD-RTO: 0.6 /100 WBCS (ref 0–0)
PLATELET # BLD AUTO: 320 X10*3/UL (ref 150–450)
POTASSIUM SERPL-SCNC: 4.2 MMOL/L (ref 3.5–5.3)
PROT SERPL-MCNC: 8.1 G/DL (ref 6.4–8.2)
RBC # BLD AUTO: 4.09 X10*6/UL (ref 4–5.2)
SODIUM SERPL-SCNC: 135 MMOL/L (ref 136–145)
TIBC SERPL-MCNC: 403 UG/DL (ref 240–445)
UIBC SERPL-MCNC: 237 UG/DL (ref 110–370)
VIT B12 SERPL-MCNC: 544 PG/ML (ref 211–911)
WBC # BLD AUTO: 3.4 X10*3/UL (ref 4.4–11.3)

## 2025-06-12 PROCEDURE — 82746 ASSAY OF FOLIC ACID SERUM: CPT

## 2025-06-12 PROCEDURE — 2500000004 HC RX 250 GENERAL PHARMACY W/ HCPCS (ALT 636 FOR OP/ED): Mod: JZ,TB | Performed by: INTERNAL MEDICINE

## 2025-06-12 PROCEDURE — 80053 COMPREHEN METABOLIC PANEL: CPT

## 2025-06-12 PROCEDURE — 86300 IMMUNOASSAY TUMOR CA 15-3: CPT

## 2025-06-12 PROCEDURE — 36415 COLL VENOUS BLD VENIPUNCTURE: CPT

## 2025-06-12 PROCEDURE — 82607 VITAMIN B-12: CPT

## 2025-06-12 PROCEDURE — 82728 ASSAY OF FERRITIN: CPT

## 2025-06-12 PROCEDURE — 83540 ASSAY OF IRON: CPT

## 2025-06-12 PROCEDURE — 96402 CHEMO HORMON ANTINEOPL SQ/IM: CPT

## 2025-06-12 PROCEDURE — 85025 COMPLETE CBC W/AUTO DIFF WBC: CPT

## 2025-06-12 RX ORDER — DIPHENHYDRAMINE HYDROCHLORIDE 50 MG/ML
50 INJECTION, SOLUTION INTRAMUSCULAR; INTRAVENOUS AS NEEDED
Status: DISCONTINUED | OUTPATIENT
Start: 2025-06-12 | End: 2025-06-12 | Stop reason: HOSPADM

## 2025-06-12 RX ORDER — EPINEPHRINE 0.3 MG/.3ML
0.3 INJECTION SUBCUTANEOUS EVERY 5 MIN PRN
Status: DISCONTINUED | OUTPATIENT
Start: 2025-06-12 | End: 2025-06-12 | Stop reason: HOSPADM

## 2025-06-12 RX ORDER — ALBUTEROL SULFATE 0.83 MG/ML
3 SOLUTION RESPIRATORY (INHALATION) AS NEEDED
Status: DISCONTINUED | OUTPATIENT
Start: 2025-06-12 | End: 2025-06-12 | Stop reason: HOSPADM

## 2025-06-12 RX ORDER — PROCHLORPERAZINE MALEATE 10 MG
10 TABLET ORAL EVERY 6 HOURS PRN
Status: DISCONTINUED | OUTPATIENT
Start: 2025-06-12 | End: 2025-06-12 | Stop reason: HOSPADM

## 2025-06-12 RX ORDER — LAMOTRIGINE 25 MG/1
500 TABLET ORAL ONCE
Status: COMPLETED | OUTPATIENT
Start: 2025-06-12 | End: 2025-06-12

## 2025-06-12 RX ORDER — PROCHLORPERAZINE EDISYLATE 5 MG/ML
10 INJECTION INTRAMUSCULAR; INTRAVENOUS EVERY 6 HOURS PRN
Status: DISCONTINUED | OUTPATIENT
Start: 2025-06-12 | End: 2025-06-12 | Stop reason: HOSPADM

## 2025-06-12 RX ORDER — FAMOTIDINE 10 MG/ML
20 INJECTION, SOLUTION INTRAVENOUS ONCE AS NEEDED
Status: DISCONTINUED | OUTPATIENT
Start: 2025-06-12 | End: 2025-06-12 | Stop reason: HOSPADM

## 2025-06-12 RX ADMIN — FULVESTRANT 500 MG: 50 INJECTION INTRAMUSCULAR at 12:36

## 2025-06-12 ASSESSMENT — PAIN SCALES - GENERAL: PAINLEVEL_OUTOF10: 8

## 2025-06-17 ENCOUNTER — HOSPITAL ENCOUNTER (OUTPATIENT)
Dept: RADIOLOGY | Facility: CLINIC | Age: 75
Discharge: HOME | End: 2025-06-17
Payer: COMMERCIAL

## 2025-06-17 DIAGNOSIS — C50.919 MALIGNANT NEOPLASM OF FEMALE BREAST, UNSPECIFIED ESTROGEN RECEPTOR STATUS, UNSPECIFIED LATERALITY, UNSPECIFIED SITE OF BREAST: ICD-10-CM

## 2025-06-17 PROCEDURE — 78815 PET IMAGE W/CT SKULL-THIGH: CPT

## 2025-06-17 PROCEDURE — 78815 PET IMAGE W/CT SKULL-THIGH: CPT | Performed by: RADIOLOGY

## 2025-06-17 PROCEDURE — A9552 F18 FDG: HCPCS | Performed by: INTERNAL MEDICINE

## 2025-06-17 PROCEDURE — 3430000001 HC RX 343 DIAGNOSTIC RADIOPHARMACEUTICALS: Performed by: INTERNAL MEDICINE

## 2025-06-17 RX ORDER — FLUDEOXYGLUCOSE F 18 200 MCI/ML
12 INJECTION, SOLUTION INTRAVENOUS
Status: COMPLETED | OUTPATIENT
Start: 2025-06-17 | End: 2025-06-17

## 2025-06-17 RX ADMIN — FLUDEOXYGLUCOSE F 18 12 MILLICURIE: 200 INJECTION, SOLUTION INTRAVENOUS at 12:58

## 2025-06-20 ENCOUNTER — OFFICE VISIT (OUTPATIENT)
Dept: HEMATOLOGY/ONCOLOGY | Facility: CLINIC | Age: 75
End: 2025-06-20
Payer: COMMERCIAL

## 2025-06-20 ENCOUNTER — TELEPHONE (OUTPATIENT)
Dept: HEMATOLOGY/ONCOLOGY | Facility: CLINIC | Age: 75
End: 2025-06-20

## 2025-06-20 VITALS
OXYGEN SATURATION: 97 % | DIASTOLIC BLOOD PRESSURE: 88 MMHG | BODY MASS INDEX: 30.37 KG/M2 | WEIGHT: 160.72 LBS | HEART RATE: 79 BPM | SYSTOLIC BLOOD PRESSURE: 150 MMHG | RESPIRATION RATE: 16 BRPM | TEMPERATURE: 97.2 F

## 2025-06-20 DIAGNOSIS — Z17.0 MALIGNANT NEOPLASM OF BREAST IN FEMALE, ESTROGEN RECEPTOR POSITIVE, UNSPECIFIED LATERALITY, UNSPECIFIED SITE OF BREAST: ICD-10-CM

## 2025-06-20 DIAGNOSIS — C50.919 MALIGNANT NEOPLASM OF BREAST IN FEMALE, ESTROGEN RECEPTOR POSITIVE, UNSPECIFIED LATERALITY, UNSPECIFIED SITE OF BREAST: ICD-10-CM

## 2025-06-20 LAB
BASOPHILS # BLD AUTO: 0.04 X10*3/UL (ref 0–0.1)
BASOPHILS NFR BLD AUTO: 1.4 %
EOSINOPHIL # BLD AUTO: 0.05 X10*3/UL (ref 0–0.4)
EOSINOPHIL NFR BLD AUTO: 1.7 %
ERYTHROCYTE [DISTWIDTH] IN BLOOD BY AUTOMATED COUNT: 16.5 % (ref 11.5–14.5)
HCT VFR BLD AUTO: 28.2 % (ref 36–46)
HGB BLD-MCNC: 9 G/DL (ref 12–16)
HGB RETIC QN: 24 PG (ref 28–38)
IMM GRANULOCYTES # BLD AUTO: 0.04 X10*3/UL (ref 0–0.5)
IMM GRANULOCYTES NFR BLD AUTO: 1.4 % (ref 0–0.9)
IMMATURE RETIC FRACTION: 28.9 %
LYMPHOCYTES # BLD AUTO: 0.98 X10*3/UL (ref 0.8–3)
LYMPHOCYTES NFR BLD AUTO: 33.7 %
MCH RBC QN AUTO: 22.7 PG (ref 26–34)
MCHC RBC AUTO-ENTMCNC: 31.9 G/DL (ref 32–36)
MCV RBC AUTO: 71 FL (ref 80–100)
MONOCYTES # BLD AUTO: 0.36 X10*3/UL (ref 0.05–0.8)
MONOCYTES NFR BLD AUTO: 12.4 %
NEUTROPHILS # BLD AUTO: 1.44 X10*3/UL (ref 1.6–5.5)
NEUTROPHILS NFR BLD AUTO: 49.4 %
NRBC BLD-RTO: 0.7 /100 WBCS (ref 0–0)
PLATELET # BLD AUTO: 239 X10*3/UL (ref 150–450)
RBC # BLD AUTO: 3.97 X10*6/UL (ref 4–5.2)
RETICS #: 0.09 X10*6/UL (ref 0.02–0.11)
RETICS/RBC NFR AUTO: 2.3 % (ref 0.5–2)
WBC # BLD AUTO: 2.9 X10*3/UL (ref 4.4–11.3)

## 2025-06-20 PROCEDURE — 85025 COMPLETE CBC W/AUTO DIFF WBC: CPT | Performed by: INTERNAL MEDICINE

## 2025-06-20 PROCEDURE — 1160F RVW MEDS BY RX/DR IN RCRD: CPT | Performed by: INTERNAL MEDICINE

## 2025-06-20 PROCEDURE — 3077F SYST BP >= 140 MM HG: CPT | Performed by: INTERNAL MEDICINE

## 2025-06-20 PROCEDURE — 99215 OFFICE O/P EST HI 40 MIN: CPT | Performed by: INTERNAL MEDICINE

## 2025-06-20 PROCEDURE — 82784 ASSAY IGA/IGD/IGG/IGM EACH: CPT | Performed by: INTERNAL MEDICINE

## 2025-06-20 PROCEDURE — 83615 LACTATE (LD) (LDH) ENZYME: CPT | Performed by: INTERNAL MEDICINE

## 2025-06-20 PROCEDURE — 85045 AUTOMATED RETICULOCYTE COUNT: CPT | Performed by: INTERNAL MEDICINE

## 2025-06-20 PROCEDURE — 1159F MED LIST DOCD IN RCRD: CPT | Performed by: INTERNAL MEDICINE

## 2025-06-20 PROCEDURE — 82668 ASSAY OF ERYTHROPOIETIN: CPT | Performed by: INTERNAL MEDICINE

## 2025-06-20 PROCEDURE — 82232 ASSAY OF BETA-2 PROTEIN: CPT | Performed by: INTERNAL MEDICINE

## 2025-06-20 PROCEDURE — 84155 ASSAY OF PROTEIN SERUM: CPT | Performed by: INTERNAL MEDICINE

## 2025-06-20 PROCEDURE — 1126F AMNT PAIN NOTED NONE PRSNT: CPT | Performed by: INTERNAL MEDICINE

## 2025-06-20 PROCEDURE — 3079F DIAST BP 80-89 MM HG: CPT | Performed by: INTERNAL MEDICINE

## 2025-06-20 PROCEDURE — 83521 IG LIGHT CHAINS FREE EACH: CPT | Performed by: INTERNAL MEDICINE

## 2025-06-20 ASSESSMENT — PAIN SCALES - GENERAL: PAINLEVEL_OUTOF10: 0-NO PAIN

## 2025-06-20 NOTE — PROGRESS NOTES
Pt seen in office today for an established patient/ new to provider follow up visit with Dr. Julián Gamino for management of her breast cancer. She was previously seen in our office on 2/27/25 by ARBEN Myles. She is without complaints today and denies pain.     Medications, pharmacy preference and allergies were reviewed with patient and updated in the medical record by MD.     Per orders, labs including a Guardant 360 were obtained on 6/12/25. She is to start on Verzenio and will RTC in 4 weeks with labs prior  for a toxicity check.    Our contact information was given to patient and they were encouraged to contact us with any questions or concerns.    Patient verbalized understanding and agreement regarding discussed information via verbal feedback. Pt escorted to scheduling.

## 2025-06-20 NOTE — TELEPHONE ENCOUNTER
Reason for Conversation  canceled labs     Background   Josefa from  [facility] called, pts cytogenetics was canceled due to wrong container used. Any questions call [phone]    Disposition   No disposition on file.

## 2025-06-20 NOTE — PROGRESS NOTES
" Patient ID: Santy Brody is a 75 y.o. female.  The patient presents to clinic today for her history of metastatic ER+/Her2- breast cancer.     Cancer Staging   Malignant neoplasm of female breast  Staging form: Breast, AJCC 8th Edition  - Pathologic: Stage IV (cM1) - Unsigned    Diagnostic/Therapeutic History:  - She presented with breast cancer presented in 1991 with a right-sided breast lesion. She had a local recurrence in 1998 in the axilla measuring 3 cm.  Her-2/smith was positive as was hormone receptors. It was never clear if this was breast tissue or seamus tissue is her recurrence. Biopsies showed metastatic disease from the left iliac bone on February 12, 2013. It was consistent with breast carcinoma  with estrogen and progesterone receptors positive and HER-2/smith negative. It also appears that PITER-3 is positive as well.   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 A T-cell gene receptor arrangement study did suggest a clone, and a bone marrow biopsy was negative. Her staging studies were otherwise negative.   In the context of evaluating for metastatic disease, she underwent a PET scan where 2 areas in the colon. She has since undergone a colonoscopy, which was abnormal in 2 separate areas. Biopsy proven disease now exist in the proximal ascending colon with  a moderately differentiated adenocarcinoma as well as the sigmoid. KRAS assessment on the tumor has not been done. She has now had a definitive resection.  ERBB2 G, Missense\" class=\"variant-details ellipsis\" _[id number]=\"\"L869R, 2606T>G, Missense   PIK3CA A, Missense\" class=\"variant-details ellipsis\" _[id number]=\"\"E545K, 1633G>A, Missense   DNMT3A K468fs*1, 1402_1415delAAGGAGATTATTGA, Frameshift   TET2 H9824dk*19, 3353delA, Frameshift.    Treatment History:    1991-0-0: Cancer Related Surgery: Right mastectomy and axillary node evaluation in 1991 1991-0-0: Disease " Assessment- Breast: Initial right-sided breast cancer diagnosed in 1991, the pathology of which, I have not been able to obtain. In 1998 she had  right axillary recurrence with 3 cm worth of tumor noted.  It was unclear if margins were  obtained or if was axiallry only tissue  1991-0-0: Chemotherapy: Six cycles of Adriamycin based chemotherapy as adjuvant      treatment in 1991.     1998-0-0: Radiation Therapy: Right axillary versus right upper quadrant base re-occurrence,      status post resection and right chest wall radiation in 1998.     1998-0-0: Cancer Related Surgery: 1998 Right axillary recurrence-3cm  2002-0-0: Hormonal Therapy: Tamoxifen was taken from 1998 to 2002 2002-7-1: New Treatment Plan: Anastrazole 2002-2008 2006-0-0: Radiation Therapy: Localized  XRT to RUE after resection of Tcell NHL  2006-12-0: Cancer Related Surgery: RUE subcutaneous mass c/w peripheral T cell NHL  2014-2-7: Disease Assessment-colon: AFter PET+ findings a colonoscopy revealed dz in 2 separate areas.  Biopsy proven  disease now exist in the proximal ascending colon with a moderately  differentiated adenocarcinoma as well as the sigmoid.  KRAS has not been done.  2014-2-12: First Relapse Date-systemic: left iliac bone on February 12, 2013.  It was  consistent with breast carcinoma with estrogen and progesterone  receptors positive and HER-2/smith negative.  2014-3-20: Chemotherapy: Capecitabine 50 mg b.i.d. status post 2 cycles, her last March 20, 2014  2014-3-28: New Treatment Plan: Abraxane 260 mg/m2  2014-7-28: New Follow-up Plan: Abraxane stopped.  Exemestane initiated  2014-9-4: Cancer Related Surgery: R hemicolectomy  2016-2-19: New Treatment Plan: Exemestane stopped/ letrozole initiated  2016-3-4: Miscellaneous: Palbociclib initiated  2022-1-5: New Treatment Plan: Letrozole/Ibrance stopped with new bone disease noted  2022-1-20: Chemotherapy: fulvestrant start  2022-2-3: Chemotherapy: Abemiciclib start    History of  "Present Illness (HPI)/Interval History:  Ms. Brody presents today for routine FUV.  Denies sinus pain, fever/chills, sore throat.  Compliant with 50 mg BID dose of Verzenio, very rarely forgets.   Diarrhea is mainly resolved, unless eats something that \"doesn't agree with her.\"   She does have some mild muscle soreness in her left upper arm, will be staring PT soon. Thought to be a pulled muscle.   Neuropathy still present in hands and feet, still bothersome. Taking gabapentin to take the edge off.   She continues feeling well. She takes care of her mother who is 92 years old.    Review of Systems:  14-point ROS otherwise negative, as per HPI.      Allergies   Allergen Reactions    Denosumab Unknown and Other     Xgeva: Adverse Reaction: Osteoporosis with fracture    Should avoid all Biphosphonates       Current Outpatient Medications   Medication Instructions    abemaciclib (VERZENIO) 50 mg, oral, 2 times daily, Swallow whole.    calcium carbonate-vitamin D3 600 mg-5 mcg (200 unit) tablet 1 tablet, 2 times daily    cranberry extract 200 mg capsule Take by mouth.    fenofibrate (Tricor) 54 mg tablet TAKE 1 TABLET (54 MG) BY MOUTH ONCE DAILY IN THE MORNING. TAKE BEFORE A MEAL    fluticasone (Flonase) 50 mcg/actuation nasal spray 2 sprays, Daily    furosemide (LASIX) 20 mg, oral, Daily    gabapentin (NEURONTIN) 100 mg, oral, Nightly    lisinopril 10 mg, oral, Daily    omega-3/dha/epa/fish oil (OMEGA-3 ORAL) Take by mouth. Omega 3 EPA + DHA 1000 MG Oral Capsule; Take 2 capsules by mouth twice daily.    TURMERIC ORAL No dose, route, or frequency recorded.         Objective    BSA: 1.77 meters squared  /88 (BP Location: Right arm, Patient Position: Sitting, BP Cuff Size: Adult long)   Pulse 79   Temp 36.2 °C (97.2 °F) (Temporal)   Resp 16   Wt 72.9 kg (160 lb 11.5 oz)   SpO2 97%   BMI 30.37 kg/m²     Performance Status:  (0) Fully active, able to carry on all predisease performance without " restriction    Physical Exam  Vitals reviewed.   Constitutional:       General: She is not in acute distress.     Appearance: She is not toxic-appearing.   HENT:      Mouth/Throat:      Mouth: Mucous membranes are moist.      Pharynx: Oropharynx is clear.   Eyes:      General: No scleral icterus.     Extraocular Movements: Extraocular movements intact.      Conjunctiva/sclera: Conjunctivae normal.   Cardiovascular:      Rate and Rhythm: Normal rate and regular rhythm.   Pulmonary:      Effort: Pulmonary effort is normal. No respiratory distress.   Musculoskeletal:         General: No swelling or tenderness.   Skin:     General: Skin is warm and dry.      Coloration: Skin is not jaundiced.   Neurological:      General: No focal deficit present.      Mental Status: She is alert and oriented to person, place, and time.   Psychiatric:         Mood and Affect: Mood normal.         Behavior: Behavior normal.         Thought Content: Thought content normal.         Judgment: Judgment normal.          Laboratory Data:  Lab Results   Component Value Date    WBC 3.4 (L) 06/12/2025    HGB 9.2 (L) 06/12/2025    HCT 29.1 (L) 06/12/2025    MCV 71 (L) 06/12/2025     06/12/2025    ANC 0.94 (L) 12/27/2023       Chemistry    Lab Results   Component Value Date/Time     (L) 06/12/2025 1203     (L) 02/18/2025 1614    K 4.2 06/12/2025 1203    K 4.2 02/18/2025 1614    CL 98 06/12/2025 1203    CL 96 (L) 02/18/2025 1614    CO2 27 06/12/2025 1203    CO2 29 02/18/2025 1614    BUN 28 (H) 06/12/2025 1203    BUN 19 02/18/2025 1614    CREATININE 1.29 (H) 06/12/2025 1203    CREATININE 1.10 (H) 02/18/2025 1614    Lab Results   Component Value Date/Time    CALCIUM 9.6 06/12/2025 1203    CALCIUM 9.7 02/18/2025 1614    ALKPHOS 54 06/12/2025 1203    ALKPHOS 49 02/18/2025 1614    AST 19 06/12/2025 1203    AST 21 02/18/2025 1614    ALT 10 06/12/2025 1203    ALT 12 02/18/2025 1614    BILITOT 0.6 06/12/2025 1203    BILITOT 0.9 02/18/2025  1614           Lab Results   Component Value Date    IRON 166 (H) 06/12/2025    TIBC 403 06/12/2025    FERRITIN 1,442 (H) 06/12/2025     Radiology:  I personally reviewed the images with patient. Radiology report reviewed and listed below for reference.    8/28/24 NM bone whole body  IMPRESSION: 1. No focal increased radiotracer uptake involving right sacral ala lower lumbar vertebrae, consistent with treated metastatic disease.  2. No new foci of radiotracer uptake to suggest new bone metastasis.  3. Interval decrease in radiotracer uptake involving L2 vertebral  body, as compared to prior bone scan dated 09/05/2023, corresponding to a compression fracture on CT.  4. Contiguous foci of radiotracer uptake involving left 4th, 5th and  6th ribs anteriorly, likely due to recent fractures.    8/28/24 CT chest abdomen pelvis w IV contrast  Impression: CHEST:  1. Status post right breast surgery/reconstruction with no definite  soft tissue masses in the chest wall.  2. No enlarged intrathoracic lymphadenopathy.  3. Stable pulmonary findings with a scattered bilateral (right-sided  predominance) areas of scarring and atelectasis. Stable predominantly  right-sided nodular density/ill-defined pulmonary nodules which did  not show FDG uptake in prior PET scan and possibly  inflammatory/atelectasis, would warrant continued attention on  follow-up. No new suspicious pulmonary nodules or masses.  4. Cardiomegaly. Severe coronary artery calcifications.      ABDOMEN-PELVIS:  1. Status post right hemicolectomy with ileocolonic anastomosis. No  complications.  2. No metastatic disease in the abdomen or pelvis.      Osseous structures:  1.   Stable superior endplate compression deformity of T12 and L2  vertebral bodies. Old left 4th-6th rib anterior fractures. Please  refer to same day bone scan official read for further details.     Assessment/Plan:  Santy Brody is a 75 y.o. female with a history of metastatic (lung and bone  involvement) breast cancer, who presents today follow-up evaluation on fulvestrant and abemaciclib.    1. Stage IV breast cancer: She has lung and bone involvement.  - Currently on fulvestrant and abemaciclib, tolerating well   - Continue abemaciclib 50 mg BID (dose-reduced for anemia).  - Grade 1 diarrhea: with 50 mg BID has not been an issue  - Grade 1 neuropathy: continue gabapentin / referral to pain management for scrambler therapy consideration   IMPRESSION:  1. Status post right mastectomy and right hemicolectomy without  evidence of hypermetabolic residual or recurrent disease in the  postsurgical bed.  2. Sequela of treated disease in the L1 vertebral body and right  sacral ala similar to prior PET-CT.  3. No new hypermetabolic lymphadenopathy, osseous, or cutaneous  disease.  -CA27.29 has significantly increased to 188. But no changes in imaging yet    Guardant 360 today. Increase Verzenio to 150 mg every day. Imodium PPX is recommended     Genomics for liquid bx (Foundation 1) 1/2022.  1. PIK3A  2. ERBB2  3. MSI- undetermined  4. VUS -- BRCA2 and ESR     2. 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 Hemoglobin baseline 8-9  ---Secondary iron overload. Goal Ferritin 1000-1,500  Ferritin 1,291 on 9/2024, pending today. Also check iron studies today   - Recheck ferritin every 3-6 months for monitoring  Ferritin is likely high due to underlying malignancy    3. Bone metastasis, osteopenia:  She is not a candidate for further bisphosphonate therapy.  - Compression fractures (osteoporotic?) noted in spine without active disease in those areas.  - No benefit to radiation in this case.  - Discussed use of Forteo as an option for bone health, but as this is a daily injection, she would like to avoid for now. If recurrent pain, could consider kyphoplasty.     4. Colon cancer:  No disease activity suspected. Most recent CEA is  normal.  Colonoscopy 4/2024: negative for polyps     5. Lymphedema:  Stable. Encouraged compression sleeve use. Overall stable.    6. Peripheral T Cell Lymphoma  Enlarged lymph node, biopsy taken 12/19/2005       LDH, myeloid NGS, flow, Myeloma labs, hbg electrophoresis, retic count and EPO level today    Julián Gamino MD  Medical Oncology  [facility]

## 2025-06-21 LAB
LDH SERPL L TO P-CCNC: 195 U/L (ref 84–246)
PROT SERPL-MCNC: 7.8 G/DL (ref 6.4–8.2)

## 2025-06-22 LAB
B2 MICROGLOB SERPL-MCNC: 2.6 MG/L (ref 0.7–2.2)
IGA SERPL-MCNC: 408 MG/DL (ref 70–400)
IGG SERPL-MCNC: 1780 MG/DL (ref 700–1600)
IGM SERPL-MCNC: 45 MG/DL (ref 40–230)
KAPPA LC SERPL-MCNC: 3.59 MG/DL (ref 0.33–1.94)
KAPPA LC/LAMBDA SER: 1.11 {RATIO} (ref 0.26–1.65)
LAMBDA LC SERPL-MCNC: 3.23 MG/DL (ref 0.57–2.63)

## 2025-06-23 LAB
HEMOGLOBIN A2: 5 % (ref 2–3.5)
HEMOGLOBIN A: 93.9 % (ref 95.8–98)
HEMOGLOBIN F: 1.1 % (ref 0–2)
HEMOGLOBIN IDENTIFICATION INTERPRETATION: ABNORMAL
PATH REVIEW-HGB IDENTIFICATION: ABNORMAL

## 2025-06-24 LAB
ALBUMIN: 4.1 G/DL (ref 3.4–5)
ALPHA 1 GLOBULIN: 0.3 G/DL (ref 0.2–0.6)
ALPHA 2 GLOBULIN: 0.5 G/DL (ref 0.4–1.1)
BETA GLOBULIN: 1.1 G/DL (ref 0.5–1.2)
EPO SERPL-ACNC: 39 MU/ML (ref 4–27)
GAMMA GLOBULIN: 1.8 G/DL (ref 0.5–1.4)
IMMUNOFIXATION COMMENT: ABNORMAL
PATH REVIEW - SERUM IMMUNOFIXATION: ABNORMAL
PATH REVIEW-SERUM PROTEIN ELECTROPHORESIS: ABNORMAL
PROTEIN ELECTROPHORESIS COMMENT: ABNORMAL

## 2025-06-25 ENCOUNTER — TELEPHONE (OUTPATIENT)
Dept: HEMATOLOGY/ONCOLOGY | Facility: CLINIC | Age: 75
End: 2025-06-25
Payer: COMMERCIAL

## 2025-06-25 LAB
ELECTRONICALLY SIGNED BY: NORMAL
MYELOID NGS RESULTS: NORMAL

## 2025-06-25 NOTE — TELEPHONE ENCOUNTER
Reason for Conversation  Medication Problem    Background   [facility] calling. They need to verify the new dose on patients Verzenio Rx. Pls call back at [phone]     Disposition   No disposition on file.

## 2025-06-25 NOTE — TELEPHONE ENCOUNTER
Spoke to [facility] Pharmacist.  Verified per my conversation with Dr. Gallego that Verzenio Rx is correct at 150 mg tablet one daily once a day. No further needs.

## 2025-06-26 LAB
CELL COUNT (BLOOD): 2.9 X10*3/UL
CELL POPULATIONS: NORMAL
CYTOGENETICS/MOLECULAR TEST ORDERED: NORMAL
DIAGNOSIS: NORMAL
FLOW DIFFERENTIAL: NORMAL
FLOW TEST ORDERED: NORMAL
LAB TEST METHOD: NORMAL
NUMBER OF CELLS COLLECTED: NORMAL PER TUBE
PATH REPORT.TOTAL CANCER: NORMAL
RBC MORPH BLD: NORMAL
SIGNATURE COMMENT: NORMAL
SPECIMEN VIABILITY: NORMAL
WBC MORPH BLD: NORMAL

## 2025-07-01 DIAGNOSIS — C50.919 MALIGNANT NEOPLASM OF FEMALE BREAST, UNSPECIFIED ESTROGEN RECEPTOR STATUS, UNSPECIFIED LATERALITY, UNSPECIFIED SITE OF BREAST: Primary | ICD-10-CM

## 2025-07-01 RX ORDER — PROCHLORPERAZINE EDISYLATE 5 MG/ML
10 INJECTION INTRAMUSCULAR; INTRAVENOUS EVERY 6 HOURS PRN
OUTPATIENT
Start: 2025-10-28

## 2025-07-01 RX ORDER — PROCHLORPERAZINE MALEATE 10 MG
10 TABLET ORAL EVERY 6 HOURS PRN
OUTPATIENT
Start: 2025-10-28

## 2025-07-01 RX ORDER — EPINEPHRINE 0.3 MG/.3ML
0.3 INJECTION SUBCUTANEOUS EVERY 5 MIN PRN
OUTPATIENT
Start: 2025-11-25

## 2025-07-01 RX ORDER — FAMOTIDINE 10 MG/ML
20 INJECTION, SOLUTION INTRAVENOUS ONCE AS NEEDED
OUTPATIENT
Start: 2025-09-30

## 2025-07-01 RX ORDER — PROCHLORPERAZINE MALEATE 10 MG
10 TABLET ORAL EVERY 6 HOURS PRN
OUTPATIENT
Start: 2025-07-10

## 2025-07-01 RX ORDER — ALBUTEROL SULFATE 0.83 MG/ML
3 SOLUTION RESPIRATORY (INHALATION) AS NEEDED
OUTPATIENT
Start: 2025-08-07

## 2025-07-01 RX ORDER — EPINEPHRINE 0.3 MG/.3ML
0.3 INJECTION SUBCUTANEOUS EVERY 5 MIN PRN
OUTPATIENT
Start: 2025-09-04

## 2025-07-01 RX ORDER — DIPHENHYDRAMINE HYDROCHLORIDE 50 MG/ML
50 INJECTION, SOLUTION INTRAMUSCULAR; INTRAVENOUS AS NEEDED
OUTPATIENT
Start: 2025-07-10

## 2025-07-01 RX ORDER — DIPHENHYDRAMINE HYDROCHLORIDE 50 MG/ML
50 INJECTION, SOLUTION INTRAMUSCULAR; INTRAVENOUS AS NEEDED
OUTPATIENT
Start: 2025-09-04

## 2025-07-01 RX ORDER — LAMOTRIGINE 25 MG/1
500 TABLET ORAL ONCE
OUTPATIENT
Start: 2025-10-28

## 2025-07-01 RX ORDER — PROCHLORPERAZINE EDISYLATE 5 MG/ML
10 INJECTION INTRAMUSCULAR; INTRAVENOUS EVERY 6 HOURS PRN
OUTPATIENT
Start: 2025-09-04

## 2025-07-01 RX ORDER — LAMOTRIGINE 25 MG/1
500 TABLET ORAL ONCE
OUTPATIENT
Start: 2025-11-25

## 2025-07-01 RX ORDER — DIPHENHYDRAMINE HYDROCHLORIDE 50 MG/ML
50 INJECTION, SOLUTION INTRAMUSCULAR; INTRAVENOUS AS NEEDED
OUTPATIENT
Start: 2025-10-28

## 2025-07-01 RX ORDER — FAMOTIDINE 10 MG/ML
20 INJECTION, SOLUTION INTRAVENOUS ONCE AS NEEDED
OUTPATIENT
Start: 2025-07-10

## 2025-07-01 RX ORDER — ALBUTEROL SULFATE 0.83 MG/ML
3 SOLUTION RESPIRATORY (INHALATION) AS NEEDED
OUTPATIENT
Start: 2025-09-04

## 2025-07-01 RX ORDER — FAMOTIDINE 10 MG/ML
20 INJECTION, SOLUTION INTRAVENOUS ONCE AS NEEDED
OUTPATIENT
Start: 2025-08-07

## 2025-07-01 RX ORDER — PROCHLORPERAZINE EDISYLATE 5 MG/ML
10 INJECTION INTRAMUSCULAR; INTRAVENOUS EVERY 6 HOURS PRN
OUTPATIENT
Start: 2025-08-07

## 2025-07-01 RX ORDER — DIPHENHYDRAMINE HYDROCHLORIDE 50 MG/ML
50 INJECTION, SOLUTION INTRAMUSCULAR; INTRAVENOUS AS NEEDED
OUTPATIENT
Start: 2025-09-30

## 2025-07-01 RX ORDER — EPINEPHRINE 0.3 MG/.3ML
0.3 INJECTION SUBCUTANEOUS EVERY 5 MIN PRN
OUTPATIENT
Start: 2025-07-10

## 2025-07-01 RX ORDER — ALBUTEROL SULFATE 0.83 MG/ML
3 SOLUTION RESPIRATORY (INHALATION) AS NEEDED
OUTPATIENT
Start: 2025-09-30

## 2025-07-01 RX ORDER — LAMOTRIGINE 25 MG/1
500 TABLET ORAL ONCE
OUTPATIENT
Start: 2025-09-04

## 2025-07-01 RX ORDER — PROCHLORPERAZINE MALEATE 10 MG
10 TABLET ORAL EVERY 6 HOURS PRN
OUTPATIENT
Start: 2025-11-25

## 2025-07-01 RX ORDER — FAMOTIDINE 10 MG/ML
20 INJECTION, SOLUTION INTRAVENOUS ONCE AS NEEDED
OUTPATIENT
Start: 2025-11-25

## 2025-07-01 RX ORDER — FAMOTIDINE 10 MG/ML
20 INJECTION, SOLUTION INTRAVENOUS ONCE AS NEEDED
OUTPATIENT
Start: 2025-09-04

## 2025-07-01 RX ORDER — PROCHLORPERAZINE EDISYLATE 5 MG/ML
10 INJECTION INTRAMUSCULAR; INTRAVENOUS EVERY 6 HOURS PRN
OUTPATIENT
Start: 2025-09-30

## 2025-07-01 RX ORDER — LAMOTRIGINE 25 MG/1
500 TABLET ORAL ONCE
OUTPATIENT
Start: 2025-07-10

## 2025-07-01 RX ORDER — PROCHLORPERAZINE MALEATE 10 MG
10 TABLET ORAL EVERY 6 HOURS PRN
OUTPATIENT
Start: 2025-08-07

## 2025-07-01 RX ORDER — PROCHLORPERAZINE EDISYLATE 5 MG/ML
10 INJECTION INTRAMUSCULAR; INTRAVENOUS EVERY 6 HOURS PRN
OUTPATIENT
Start: 2025-07-10

## 2025-07-01 RX ORDER — ALBUTEROL SULFATE 0.83 MG/ML
3 SOLUTION RESPIRATORY (INHALATION) AS NEEDED
OUTPATIENT
Start: 2025-07-10

## 2025-07-01 RX ORDER — LAMOTRIGINE 25 MG/1
500 TABLET ORAL ONCE
OUTPATIENT
Start: 2025-08-07

## 2025-07-01 RX ORDER — FAMOTIDINE 10 MG/ML
20 INJECTION, SOLUTION INTRAVENOUS ONCE AS NEEDED
OUTPATIENT
Start: 2025-10-28

## 2025-07-01 RX ORDER — ALBUTEROL SULFATE 0.83 MG/ML
3 SOLUTION RESPIRATORY (INHALATION) AS NEEDED
OUTPATIENT
Start: 2025-11-25

## 2025-07-01 RX ORDER — EPINEPHRINE 0.3 MG/.3ML
0.3 INJECTION SUBCUTANEOUS EVERY 5 MIN PRN
OUTPATIENT
Start: 2025-10-28

## 2025-07-01 RX ORDER — DIPHENHYDRAMINE HYDROCHLORIDE 50 MG/ML
50 INJECTION, SOLUTION INTRAMUSCULAR; INTRAVENOUS AS NEEDED
OUTPATIENT
Start: 2025-11-25

## 2025-07-01 RX ORDER — LAMOTRIGINE 25 MG/1
500 TABLET ORAL ONCE
OUTPATIENT
Start: 2025-09-30

## 2025-07-01 RX ORDER — PROCHLORPERAZINE MALEATE 10 MG
10 TABLET ORAL EVERY 6 HOURS PRN
OUTPATIENT
Start: 2025-09-30

## 2025-07-01 RX ORDER — ALBUTEROL SULFATE 0.83 MG/ML
3 SOLUTION RESPIRATORY (INHALATION) AS NEEDED
OUTPATIENT
Start: 2025-10-28

## 2025-07-01 RX ORDER — EPINEPHRINE 0.3 MG/.3ML
0.3 INJECTION SUBCUTANEOUS EVERY 5 MIN PRN
OUTPATIENT
Start: 2025-08-07

## 2025-07-01 RX ORDER — DIPHENHYDRAMINE HYDROCHLORIDE 50 MG/ML
50 INJECTION, SOLUTION INTRAMUSCULAR; INTRAVENOUS AS NEEDED
OUTPATIENT
Start: 2025-08-07

## 2025-07-01 RX ORDER — EPINEPHRINE 0.3 MG/.3ML
0.3 INJECTION SUBCUTANEOUS EVERY 5 MIN PRN
OUTPATIENT
Start: 2025-09-30

## 2025-07-01 RX ORDER — PROCHLORPERAZINE MALEATE 10 MG
10 TABLET ORAL EVERY 6 HOURS PRN
OUTPATIENT
Start: 2025-09-04

## 2025-07-01 RX ORDER — PROCHLORPERAZINE EDISYLATE 5 MG/ML
10 INJECTION INTRAMUSCULAR; INTRAVENOUS EVERY 6 HOURS PRN
OUTPATIENT
Start: 2025-11-25

## 2025-07-10 ENCOUNTER — INFUSION (OUTPATIENT)
Dept: HEMATOLOGY/ONCOLOGY | Facility: CLINIC | Age: 75
End: 2025-07-10
Payer: COMMERCIAL

## 2025-07-10 ENCOUNTER — LAB (OUTPATIENT)
Dept: LAB | Facility: CLINIC | Age: 75
End: 2025-07-10
Payer: COMMERCIAL

## 2025-07-10 VITALS
OXYGEN SATURATION: 99 % | SYSTOLIC BLOOD PRESSURE: 169 MMHG | HEART RATE: 59 BPM | TEMPERATURE: 97 F | BODY MASS INDEX: 30.56 KG/M2 | WEIGHT: 161.72 LBS | DIASTOLIC BLOOD PRESSURE: 90 MMHG | RESPIRATION RATE: 18 BRPM

## 2025-07-10 DIAGNOSIS — C50.919 MALIGNANT NEOPLASM OF FEMALE BREAST, UNSPECIFIED ESTROGEN RECEPTOR STATUS, UNSPECIFIED LATERALITY, UNSPECIFIED SITE OF BREAST: ICD-10-CM

## 2025-07-10 DIAGNOSIS — Z17.0 MALIGNANT NEOPLASM OF BREAST IN FEMALE, ESTROGEN RECEPTOR POSITIVE, UNSPECIFIED LATERALITY, UNSPECIFIED SITE OF BREAST: ICD-10-CM

## 2025-07-10 DIAGNOSIS — C50.919 MALIGNANT NEOPLASM OF BREAST IN FEMALE, ESTROGEN RECEPTOR POSITIVE, UNSPECIFIED LATERALITY, UNSPECIFIED SITE OF BREAST: ICD-10-CM

## 2025-07-10 LAB
ALBUMIN SERPL BCP-MCNC: 4 G/DL (ref 3.4–5)
ALP SERPL-CCNC: 71 U/L (ref 33–136)
ALT SERPL W P-5'-P-CCNC: 10 U/L (ref 7–45)
ANION GAP SERPL CALC-SCNC: 12 MMOL/L (ref 10–20)
AST SERPL W P-5'-P-CCNC: 19 U/L (ref 9–39)
BASOPHILS # BLD AUTO: 0.04 X10*3/UL (ref 0–0.1)
BASOPHILS NFR BLD AUTO: 1.4 %
BILIRUB SERPL-MCNC: 0.6 MG/DL (ref 0–1.2)
BUN SERPL-MCNC: 23 MG/DL (ref 6–23)
CALCIUM SERPL-MCNC: 9 MG/DL (ref 8.6–10.3)
CHLORIDE SERPL-SCNC: 101 MMOL/L (ref 98–107)
CO2 SERPL-SCNC: 26 MMOL/L (ref 21–32)
CREAT SERPL-MCNC: 1.17 MG/DL (ref 0.5–1.05)
EGFRCR SERPLBLD CKD-EPI 2021: 49 ML/MIN/1.73M*2
EOSINOPHIL # BLD AUTO: 0.04 X10*3/UL (ref 0–0.4)
EOSINOPHIL NFR BLD AUTO: 1.4 %
ERYTHROCYTE [DISTWIDTH] IN BLOOD BY AUTOMATED COUNT: 17.2 % (ref 11.5–14.5)
GLUCOSE SERPL-MCNC: 113 MG/DL (ref 74–99)
HCT VFR BLD AUTO: 27.7 % (ref 36–46)
HGB BLD-MCNC: 8.8 G/DL (ref 12–16)
IMM GRANULOCYTES # BLD AUTO: 0.02 X10*3/UL (ref 0–0.5)
IMM GRANULOCYTES NFR BLD AUTO: 0.7 % (ref 0–0.9)
IRON SATN MFR SERPL: 29 % (ref 25–45)
IRON SERPL-MCNC: 106 UG/DL (ref 35–150)
LYMPHOCYTES # BLD AUTO: 1 X10*3/UL (ref 0.8–3)
LYMPHOCYTES NFR BLD AUTO: 35.8 %
MCH RBC QN AUTO: 22.5 PG (ref 26–34)
MCHC RBC AUTO-ENTMCNC: 31.8 G/DL (ref 32–36)
MCV RBC AUTO: 71 FL (ref 80–100)
MONOCYTES # BLD AUTO: 0.34 X10*3/UL (ref 0.05–0.8)
MONOCYTES NFR BLD AUTO: 12.2 %
NEUTROPHILS # BLD AUTO: 1.35 X10*3/UL (ref 1.6–5.5)
NEUTROPHILS NFR BLD AUTO: 48.5 %
NRBC BLD-RTO: 0.7 /100 WBCS (ref 0–0)
PLATELET # BLD AUTO: 206 X10*3/UL (ref 150–450)
POTASSIUM SERPL-SCNC: 4 MMOL/L (ref 3.5–5.3)
PROT SERPL-MCNC: 7.3 G/DL (ref 6.4–8.2)
RBC # BLD AUTO: 3.91 X10*6/UL (ref 4–5.2)
SODIUM SERPL-SCNC: 135 MMOL/L (ref 136–145)
TIBC SERPL-MCNC: 366 UG/DL (ref 240–445)
UIBC SERPL-MCNC: 260 UG/DL (ref 110–370)
VIT B12 SERPL-MCNC: 364 PG/ML (ref 211–911)
WBC # BLD AUTO: 2.8 X10*3/UL (ref 4.4–11.3)

## 2025-07-10 PROCEDURE — 96402 CHEMO HORMON ANTINEOPL SQ/IM: CPT

## 2025-07-10 PROCEDURE — 88185 FLOWCYTOMETRY/TC ADD-ON: CPT | Mod: TC

## 2025-07-10 PROCEDURE — 36415 COLL VENOUS BLD VENIPUNCTURE: CPT

## 2025-07-10 PROCEDURE — 82607 VITAMIN B-12: CPT

## 2025-07-10 PROCEDURE — 83540 ASSAY OF IRON: CPT

## 2025-07-10 PROCEDURE — 85025 COMPLETE CBC W/AUTO DIFF WBC: CPT

## 2025-07-10 PROCEDURE — 80053 COMPREHEN METABOLIC PANEL: CPT

## 2025-07-10 PROCEDURE — 2500000004 HC RX 250 GENERAL PHARMACY W/ HCPCS (ALT 636 FOR OP/ED): Mod: JZ,TB | Performed by: INTERNAL MEDICINE

## 2025-07-10 RX ORDER — PROCHLORPERAZINE MALEATE 10 MG
10 TABLET ORAL EVERY 6 HOURS PRN
Status: DISCONTINUED | OUTPATIENT
Start: 2025-07-10 | End: 2025-07-10 | Stop reason: HOSPADM

## 2025-07-10 RX ORDER — DIPHENHYDRAMINE HYDROCHLORIDE 50 MG/ML
50 INJECTION, SOLUTION INTRAMUSCULAR; INTRAVENOUS AS NEEDED
Status: DISCONTINUED | OUTPATIENT
Start: 2025-07-10 | End: 2025-07-10 | Stop reason: HOSPADM

## 2025-07-10 RX ORDER — EPINEPHRINE 0.3 MG/.3ML
0.3 INJECTION SUBCUTANEOUS EVERY 5 MIN PRN
Status: DISCONTINUED | OUTPATIENT
Start: 2025-07-10 | End: 2025-07-10 | Stop reason: HOSPADM

## 2025-07-10 RX ORDER — PROCHLORPERAZINE EDISYLATE 5 MG/ML
10 INJECTION INTRAMUSCULAR; INTRAVENOUS EVERY 6 HOURS PRN
Status: DISCONTINUED | OUTPATIENT
Start: 2025-07-10 | End: 2025-07-10 | Stop reason: HOSPADM

## 2025-07-10 RX ORDER — FAMOTIDINE 10 MG/ML
20 INJECTION, SOLUTION INTRAVENOUS ONCE AS NEEDED
Status: DISCONTINUED | OUTPATIENT
Start: 2025-07-10 | End: 2025-07-10 | Stop reason: HOSPADM

## 2025-07-10 RX ORDER — LAMOTRIGINE 25 MG/1
500 TABLET ORAL ONCE
Status: COMPLETED | OUTPATIENT
Start: 2025-07-10 | End: 2025-07-10

## 2025-07-10 RX ORDER — ALBUTEROL SULFATE 0.83 MG/ML
3 SOLUTION RESPIRATORY (INHALATION) AS NEEDED
Status: DISCONTINUED | OUTPATIENT
Start: 2025-07-10 | End: 2025-07-10 | Stop reason: HOSPADM

## 2025-07-10 RX ADMIN — FULVESTRANT 500 MG: 50 INJECTION, SOLUTION INTRAMUSCULAR at 09:36

## 2025-07-10 ASSESSMENT — PAIN SCALES - GENERAL: PAINLEVEL_OUTOF10: 5

## 2025-07-10 NOTE — PROGRESS NOTES
Pt presents to clinic for Faslodex. Pt tolerated well. SiteS without redness, no c/o continued pain at injection site. pt to call with any questions and/or concerns.

## 2025-07-11 LAB
CELL COUNT (BLOOD): 2.8 X10*3/UL
CELL POPULATIONS: NORMAL
DIAGNOSIS: NORMAL
FLOW DIFFERENTIAL: NORMAL
FLOW TEST ORDERED: NORMAL
LAB TEST METHOD: NORMAL
NUMBER OF CELLS COLLECTED: NORMAL PER TUBE
PATH REPORT.TOTAL CANCER: NORMAL
SIGNATURE COMMENT: NORMAL
SPECIMEN VIABILITY: NORMAL

## 2025-07-22 ENCOUNTER — LAB (OUTPATIENT)
Dept: LAB | Facility: CLINIC | Age: 75
End: 2025-07-22
Payer: COMMERCIAL

## 2025-07-22 ENCOUNTER — OFFICE VISIT (OUTPATIENT)
Dept: HEMATOLOGY/ONCOLOGY | Facility: CLINIC | Age: 75
End: 2025-07-22
Payer: COMMERCIAL

## 2025-07-22 VITALS
BODY MASS INDEX: 30.55 KG/M2 | OXYGEN SATURATION: 98 % | DIASTOLIC BLOOD PRESSURE: 104 MMHG | RESPIRATION RATE: 18 BRPM | SYSTOLIC BLOOD PRESSURE: 172 MMHG | WEIGHT: 161.71 LBS | TEMPERATURE: 96.8 F | HEART RATE: 80 BPM

## 2025-07-22 DIAGNOSIS — C50.919 MALIGNANT NEOPLASM OF BREAST IN FEMALE, ESTROGEN RECEPTOR POSITIVE, UNSPECIFIED LATERALITY, UNSPECIFIED SITE OF BREAST: ICD-10-CM

## 2025-07-22 DIAGNOSIS — Z17.0 MALIGNANT NEOPLASM OF BREAST IN FEMALE, ESTROGEN RECEPTOR POSITIVE, UNSPECIFIED LATERALITY, UNSPECIFIED SITE OF BREAST: ICD-10-CM

## 2025-07-22 PROCEDURE — 1160F RVW MEDS BY RX/DR IN RCRD: CPT | Performed by: INTERNAL MEDICINE

## 2025-07-22 PROCEDURE — 86300 IMMUNOASSAY TUMOR CA 15-3: CPT

## 2025-07-22 PROCEDURE — 99215 OFFICE O/P EST HI 40 MIN: CPT | Performed by: INTERNAL MEDICINE

## 2025-07-22 PROCEDURE — 3080F DIAST BP >= 90 MM HG: CPT | Performed by: INTERNAL MEDICINE

## 2025-07-22 PROCEDURE — 1159F MED LIST DOCD IN RCRD: CPT | Performed by: INTERNAL MEDICINE

## 2025-07-22 PROCEDURE — 36415 COLL VENOUS BLD VENIPUNCTURE: CPT

## 2025-07-22 PROCEDURE — 1126F AMNT PAIN NOTED NONE PRSNT: CPT | Performed by: INTERNAL MEDICINE

## 2025-07-22 PROCEDURE — 3077F SYST BP >= 140 MM HG: CPT | Performed by: INTERNAL MEDICINE

## 2025-07-22 ASSESSMENT — COLUMBIA-SUICIDE SEVERITY RATING SCALE - C-SSRS
6. HAVE YOU EVER DONE ANYTHING, STARTED TO DO ANYTHING, OR PREPARED TO DO ANYTHING TO END YOUR LIFE?: NO
2. HAVE YOU ACTUALLY HAD ANY THOUGHTS OF KILLING YOURSELF?: NO
1. IN THE PAST MONTH, HAVE YOU WISHED YOU WERE DEAD OR WISHED YOU COULD GO TO SLEEP AND NOT WAKE UP?: NO

## 2025-07-22 ASSESSMENT — PAIN SCALES - GENERAL: PAINLEVEL_OUTOF10: 0-NO PAIN

## 2025-07-22 NOTE — PROGRESS NOTES
" Patient ID: Santy Brody is a 75 y.o. female.  The patient presents to clinic today for her history of metastatic ER+/Her2- breast cancer.     Cancer Staging   Malignant neoplasm of female breast  Staging form: Breast, AJCC 8th Edition  - Pathologic: Stage IV (cM1) - Unsigned    Diagnostic/Therapeutic History:  - She presented with breast cancer presented in 1991 with a right-sided breast lesion. She had a local recurrence in 1998 in the axilla measuring 3 cm.  Her-2/smith was positive as was hormone receptors. It was never clear if this was breast tissue or seamus tissue is her recurrence. Biopsies showed metastatic disease from the left iliac bone on February 12, 2013. It was consistent with breast carcinoma  with estrogen and progesterone receptors positive and HER-2/smith negative. It also appears that PITER-3 is positive as well.   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 A T-cell gene receptor arrangement study did suggest a clone, and a bone marrow biopsy was negative. Her staging studies were otherwise negative.   In the context of evaluating for metastatic disease, she underwent a PET scan where 2 areas in the colon. She has since undergone a colonoscopy, which was abnormal in 2 separate areas. Biopsy proven disease now exist in the proximal ascending colon with  a moderately differentiated adenocarcinoma as well as the sigmoid. KRAS assessment on the tumor has not been done. She has now had a definitive resection.  ERBB2 G, Missense\" class=\"variant-details ellipsis\" _[id number]=\"\"L869R, 2606T>G, Missense   PIK3CA A, Missense\" class=\"variant-details ellipsis\" _[id number]=\"\"E545K, 1633G>A, Missense   DNMT3A K468fs*1, 1402_1415delAAGGAGATTATTGA, Frameshift   TET2 H8783to*19, 3353delA, Frameshift.    Treatment History:    1991-0-0: Cancer Related Surgery: Right mastectomy and axillary node evaluation in 1991 1991-0-0: Disease " Assessment- Breast: Initial right-sided breast cancer diagnosed in 1991, the pathology of which, I have not been able to obtain. In 1998 she had  right axillary recurrence with 3 cm worth of tumor noted.  It was unclear if margins were  obtained or if was axiallry only tissue  1991-0-0: Chemotherapy: Six cycles of Adriamycin based chemotherapy as adjuvant      treatment in 1991.     1998-0-0: Radiation Therapy: Right axillary versus right upper quadrant base re-occurrence,      status post resection and right chest wall radiation in 1998.     1998-0-0: Cancer Related Surgery: 1998 Right axillary recurrence-3cm  2002-0-0: Hormonal Therapy: Tamoxifen was taken from 1998 to 2002 2002-7-1: New Treatment Plan: Anastrazole 2002-2008 2006-0-0: Radiation Therapy: Localized  XRT to RUE after resection of Tcell NHL  2006-12-0: Cancer Related Surgery: RUE subcutaneous mass c/w peripheral T cell NHL  2014-2-7: Disease Assessment-colon: AFter PET+ findings a colonoscopy revealed dz in 2 separate areas.  Biopsy proven  disease now exist in the proximal ascending colon with a moderately  differentiated adenocarcinoma as well as the sigmoid.  KRAS has not been done.  2014-2-12: First Relapse Date-systemic: left iliac bone on February 12, 2013.  It was  consistent with breast carcinoma with estrogen and progesterone  receptors positive and HER-2/smith negative.  2014-3-20: Chemotherapy: Capecitabine 50 mg b.i.d. status post 2 cycles, her last March 20, 2014  2014-3-28: New Treatment Plan: Abraxane 260 mg/m2  2014-7-28: New Follow-up Plan: Abraxane stopped.  Exemestane initiated  2014-9-4: Cancer Related Surgery: R hemicolectomy  2016-2-19: New Treatment Plan: Exemestane stopped/ letrozole initiated  2016-3-4: Miscellaneous: Palbociclib initiated  2022-1-5: New Treatment Plan: Letrozole/Ibrance stopped with new bone disease noted  2022-1-20: Chemotherapy: fulvestrant start  2022-2-3: Chemotherapy: Abemiciclib start    History of  "Present Illness (HPI)/Interval History:  Ms. Brody presents today for routine FUV.  Denies sinus pain, fever/chills, sore throat.  Compliant with 50 mg BID dose of Verzenio, very rarely forgets.   Diarrhea is mainly resolved, unless eats something that \"doesn't agree with her.\"   She does have some mild muscle soreness in her left upper arm, will be staring PT soon. Thought to be a pulled muscle.   Neuropathy still present in hands and feet, still bothersome. Taking gabapentin to take the edge off.   She continues feeling well. She takes care of her mother who is 92 years old.    Review of Systems:  14-point ROS otherwise negative, as per HPI.      Allergies   Allergen Reactions    Denosumab Unknown and Other     Xgeva: Adverse Reaction: Osteoporosis with fracture    Should avoid all Biphosphonates       Current Outpatient Medications   Medication Instructions    abemaciclib (VERZENIO) 150 mg, oral, Daily, Swallow whole.    calcium carbonate-vitamin D3 600 mg-5 mcg (200 unit) tablet 1 tablet, 2 times daily    cranberry extract 200 mg capsule Take by mouth.    fenofibrate (Tricor) 54 mg tablet TAKE 1 TABLET (54 MG) BY MOUTH ONCE DAILY IN THE MORNING. TAKE BEFORE A MEAL    fluticasone (Flonase) 50 mcg/actuation nasal spray 2 sprays, Daily    furosemide (LASIX) 20 mg, oral, Daily    gabapentin (NEURONTIN) 100 mg, oral, Nightly    lisinopril 10 mg, oral, Daily    omega-3/dha/epa/fish oil (OMEGA-3 ORAL) Take by mouth. Omega 3 EPA + DHA 1000 MG Oral Capsule; Take 2 capsules by mouth twice daily.    TURMERIC ORAL No dose, route, or frequency recorded.         Objective    BSA: 1.78 meters squared  BP (!) 185/100 (BP Location: Left arm, Patient Position: Sitting, BP Cuff Size: Adult long)   Pulse 80   Temp 36 °C (96.8 °F) (Temporal)   Resp 18   Wt 73.4 kg (161 lb 11.3 oz)   SpO2 98%   BMI 30.55 kg/m²     Performance Status:  (0) Fully active, able to carry on all predisease performance without restriction    Physical " Exam  Vitals reviewed.   Constitutional:       General: She is not in acute distress.     Appearance: She is not toxic-appearing.   HENT:      Mouth/Throat:      Mouth: Mucous membranes are moist.      Pharynx: Oropharynx is clear.     Eyes:      General: No scleral icterus.     Extraocular Movements: Extraocular movements intact.      Conjunctiva/sclera: Conjunctivae normal.       Cardiovascular:      Rate and Rhythm: Normal rate and regular rhythm.   Pulmonary:      Effort: Pulmonary effort is normal. No respiratory distress.     Musculoskeletal:         General: No swelling or tenderness.     Skin:     General: Skin is warm and dry.      Coloration: Skin is not jaundiced.     Neurological:      General: No focal deficit present.      Mental Status: She is alert and oriented to person, place, and time.     Psychiatric:         Mood and Affect: Mood normal.         Behavior: Behavior normal.         Thought Content: Thought content normal.         Judgment: Judgment normal.            Laboratory Data:  Lab Results   Component Value Date    WBC 2.8 (L) 07/10/2025    HGB 8.8 (L) 07/10/2025    HCT 27.7 (L) 07/10/2025    MCV 71 (L) 07/10/2025     07/10/2025    ANC 0.94 (L) 12/27/2023       Chemistry    Lab Results   Component Value Date/Time     (L) 07/10/2025 0859     (L) 02/18/2025 1614    K 4.0 07/10/2025 0859    K 4.2 02/18/2025 1614     07/10/2025 0859    CL 96 (L) 02/18/2025 1614    CO2 26 07/10/2025 0859    CO2 29 02/18/2025 1614    BUN 23 07/10/2025 0859    BUN 19 02/18/2025 1614    CREATININE 1.17 (H) 07/10/2025 0859    CREATININE 1.10 (H) 02/18/2025 1614    Lab Results   Component Value Date/Time    CALCIUM 9.0 07/10/2025 0859    CALCIUM 9.7 02/18/2025 1614    ALKPHOS 71 07/10/2025 0859    ALKPHOS 49 02/18/2025 1614    AST 19 07/10/2025 0859    AST 21 02/18/2025 1614    ALT 10 07/10/2025 0859    ALT 12 02/18/2025 1614    BILITOT 0.6 07/10/2025 0859    BILITOT 0.9 02/18/2025 1614            Lab Results   Component Value Date    IRON 106 07/10/2025    TIBC 366 07/10/2025    FERRITIN 1,442 (H) 06/12/2025     Radiology:  I personally reviewed the images with patient. Radiology report reviewed and listed below for reference.    8/28/24 NM bone whole body  IMPRESSION: 1. No focal increased radiotracer uptake involving right sacral ala lower lumbar vertebrae, consistent with treated metastatic disease.  2. No new foci of radiotracer uptake to suggest new bone metastasis.  3. Interval decrease in radiotracer uptake involving L2 vertebral  body, as compared to prior bone scan dated 09/05/2023, corresponding to a compression fracture on CT.  4. Contiguous foci of radiotracer uptake involving left 4th, 5th and  6th ribs anteriorly, likely due to recent fractures.    8/28/24 CT chest abdomen pelvis w IV contrast  Impression: CHEST:  1. Status post right breast surgery/reconstruction with no definite  soft tissue masses in the chest wall.  2. No enlarged intrathoracic lymphadenopathy.  3. Stable pulmonary findings with a scattered bilateral (right-sided  predominance) areas of scarring and atelectasis. Stable predominantly  right-sided nodular density/ill-defined pulmonary nodules which did  not show FDG uptake in prior PET scan and possibly  inflammatory/atelectasis, would warrant continued attention on  follow-up. No new suspicious pulmonary nodules or masses.  4. Cardiomegaly. Severe coronary artery calcifications.      ABDOMEN-PELVIS:  1. Status post right hemicolectomy with ileocolonic anastomosis. No  complications.  2. No metastatic disease in the abdomen or pelvis.      Osseous structures:  1.   Stable superior endplate compression deformity of T12 and L2  vertebral bodies. Old left 4th-6th rib anterior fractures. Please  refer to same day bone scan official read for further details.     Assessment/Plan:  Santy Brody is a 75 y.o. female with a history of metastatic (lung and bone involvement)  breast cancer, who presents today follow-up evaluation on fulvestrant and abemaciclib.    Genomics for liquid bx ([facility]) 1/2022.  1. PIK3A  2. ERBB2  3. MSI- undetermined  4. VUS -- BRCA2 and ESR    1. Stage IV breast cancer: She has lung and bone involvement.  - Currently on fulvestrant and abemaciclib, tolerating well   - Continue abemaciclib 150 mg BID (dose-reduced for anemia).  - Grade 1 diarrhea: with 50 mg BID has not been an issue  - Grade 1 neuropathy: continue gabapentin / referral to pain management for scrambler therapy consideration     PET CT 06/17/25 IMPRESSION:  1. Status post right mastectomy and right hemicolectomy without  evidence of hypermetabolic residual or recurrent disease in the  postsurgical bed.  2. Sequela of treated disease in the L1 vertebral body and right  sacral ala similar to prior PET-CT.  3. No new hypermetabolic lymphadenopathy, osseous, or cutaneous  disease.    06/2025: -CA27.29 has significantly increased to 188. But no changes in imaging yet. Guardant 360 today. Increase Verzenio to 150 mg every day. Imodium PPX is recommended     07/22/25: Tolerating Verzenio 150 mg every day well. We will increase the dose to Verzenio 100 mg po BID. CA 27 29 is pending.     06/2025: Guardant 360: AVANI, Tm fraction is low, HRR negative, AKT1/BRCa1/2, Her2, ESR1, NRG1, NTRK, PIK3CA, PTEN, RET are negative.    Will check ct DNA today and q 3 months    RTC in 4 weeks with labs and CA 27/29       2. 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 Hemoglobin baseline 8-9  ---Secondary iron overload. Goal Ferritin 1000-1,500  - Recheck ferritin every 3-6 months for monitoring  Ferritin is likely high due to underlying malignancy plus due to thalassemia    3. Bone metastasis, osteopenia:  She is not a candidate for further bisphosphonate therapy.  - Compression fractures (osteoporotic?) noted in spine without active  disease in those areas.  - No benefit to radiation in this case.  - Discussed use of Forteo as an option for bone health, but as this is a daily injection, she would like to avoid for now. If recurrent pain, could consider kyphoplasty.     4. Colon cancer:  No disease activity suspected. Most recent CEA is normal.  Colonoscopy 4/2024: negative for polyps     5. Lymphedema:  Stable. Encouraged compression sleeve use. Overall stable.    6. Peripheral T Cell Lymphoma  Enlarged lymph node, biopsy taken 12/19/2005. In remission    Julián Gamino MD  Medical Oncology  [facility]

## 2025-07-22 NOTE — PROGRESS NOTES
Pt seen in office today for a follow up visit with Dr. Julián Gamino for management of her breast cancer.  She is  without complaints today and denies pain. She remains on Verzenio 150 mg daily. Her BP is elevated today to 172/104. She is on BP meds, however she feels as if they may need to be increased. Dr. Gamino is aware.    Medications, pharmacy preference and allergies were reviewed with patient and updated in the medical record by MD.     Per orders, labs were obtained on 7/10/25. Her last PET was competed on 6/14/25. Her next Faslodex is due on 8/7/25. Her last MMG was completed on 2/12/25 and her last Dexa scan was completed on 10/2/23. Her Guardant 360 results have bene given to Dr. Gamino for review. Patient will need to have a CA 27-29  and her first Signatera ( both to be drawn q3m) drawn today.    Our contact information was given to patient and they were encouraged to contact us with any questions or concerns.    Patient verbalized understanding and agreement regarding discussed information via verbal feedback. Pt escorted to scheduling.

## 2025-07-23 LAB — CANCER AG27-29 SERPL-ACNC: 203.4 U/ML (ref 0–38.6)

## 2025-07-25 ENCOUNTER — LAB (OUTPATIENT)
Dept: LAB | Facility: CLINIC | Age: 75
End: 2025-07-25
Payer: COMMERCIAL

## 2025-07-25 DIAGNOSIS — Z17.0 MALIGNANT NEOPLASM OF BREAST IN FEMALE, ESTROGEN RECEPTOR POSITIVE, UNSPECIFIED LATERALITY, UNSPECIFIED SITE OF BREAST: ICD-10-CM

## 2025-07-25 DIAGNOSIS — C50.919 MALIGNANT NEOPLASM OF BREAST IN FEMALE, ESTROGEN RECEPTOR POSITIVE, UNSPECIFIED LATERALITY, UNSPECIFIED SITE OF BREAST: ICD-10-CM

## 2025-07-25 PROCEDURE — 36415 COLL VENOUS BLD VENIPUNCTURE: CPT

## 2025-08-07 ENCOUNTER — LAB (OUTPATIENT)
Dept: LAB | Facility: CLINIC | Age: 75
End: 2025-08-07
Payer: COMMERCIAL

## 2025-08-07 ENCOUNTER — INFUSION (OUTPATIENT)
Dept: HEMATOLOGY/ONCOLOGY | Facility: CLINIC | Age: 75
End: 2025-08-07
Payer: COMMERCIAL

## 2025-08-07 VITALS
WEIGHT: 161.82 LBS | DIASTOLIC BLOOD PRESSURE: 65 MMHG | SYSTOLIC BLOOD PRESSURE: 159 MMHG | OXYGEN SATURATION: 99 % | TEMPERATURE: 96.4 F | HEART RATE: 85 BPM | BODY MASS INDEX: 30.58 KG/M2 | RESPIRATION RATE: 18 BRPM

## 2025-08-07 DIAGNOSIS — C50.919 MALIGNANT NEOPLASM OF FEMALE BREAST, UNSPECIFIED ESTROGEN RECEPTOR STATUS, UNSPECIFIED LATERALITY, UNSPECIFIED SITE OF BREAST: ICD-10-CM

## 2025-08-07 DIAGNOSIS — C50.919 MALIGNANT NEOPLASM OF FEMALE BREAST, UNSPECIFIED ESTROGEN RECEPTOR STATUS, UNSPECIFIED LATERALITY, UNSPECIFIED SITE OF BREAST: Primary | ICD-10-CM

## 2025-08-07 LAB
ALBUMIN SERPL BCP-MCNC: 4.1 G/DL (ref 3.4–5)
ALP SERPL-CCNC: 45 U/L (ref 33–136)
ALT SERPL W P-5'-P-CCNC: 13 U/L (ref 7–45)
ANION GAP SERPL CALC-SCNC: 10 MMOL/L (ref 10–20)
AST SERPL W P-5'-P-CCNC: 22 U/L (ref 9–39)
BASO STIPL BLD QL SMEAR: PRESENT
BASOPHILS # BLD MANUAL: 0.02 X10*3/UL (ref 0–0.1)
BASOPHILS NFR BLD MANUAL: 1 %
BILIRUB SERPL-MCNC: 0.9 MG/DL (ref 0–1.2)
BUN SERPL-MCNC: 22 MG/DL (ref 6–23)
CALCIUM SERPL-MCNC: 9 MG/DL (ref 8.6–10.3)
CHLORIDE SERPL-SCNC: 101 MMOL/L (ref 98–107)
CO2 SERPL-SCNC: 29 MMOL/L (ref 21–32)
CREAT SERPL-MCNC: 1.14 MG/DL (ref 0.5–1.05)
DACRYOCYTES BLD QL SMEAR: ABNORMAL
EGFRCR SERPLBLD CKD-EPI 2021: 51 ML/MIN/1.73M*2
EOSINOPHIL # BLD MANUAL: 0.02 X10*3/UL (ref 0–0.4)
EOSINOPHIL NFR BLD MANUAL: 1 %
ERYTHROCYTE [DISTWIDTH] IN BLOOD BY AUTOMATED COUNT: 17.1 % (ref 11.5–14.5)
GLUCOSE SERPL-MCNC: 102 MG/DL (ref 74–99)
HCT VFR BLD AUTO: 26.9 % (ref 36–46)
HGB BLD-MCNC: 8.5 G/DL (ref 12–16)
HYPOCHROMIA BLD QL SMEAR: ABNORMAL
IMM GRANULOCYTES # BLD AUTO: 0.01 X10*3/UL (ref 0–0.5)
IMM GRANULOCYTES NFR BLD AUTO: 0.5 % (ref 0–0.9)
LYMPHOCYTES # BLD MANUAL: 0.92 X10*3/UL (ref 0.8–3)
LYMPHOCYTES NFR BLD MANUAL: 46 %
MCH RBC QN AUTO: 22.5 PG (ref 26–34)
MCHC RBC AUTO-ENTMCNC: 31.6 G/DL (ref 32–36)
MCV RBC AUTO: 71 FL (ref 80–100)
MONOCYTES # BLD MANUAL: 0.06 X10*3/UL (ref 0.05–0.8)
MONOCYTES NFR BLD MANUAL: 3 %
NEUTS SEG # BLD MANUAL: 0.98 X10*3/UL (ref 1.6–5)
NEUTS SEG NFR BLD MANUAL: 49 %
NRBC BLD-RTO: 1 /100 WBCS (ref 0–0)
OVALOCYTES BLD QL SMEAR: ABNORMAL
PLATELET # BLD AUTO: 185 X10*3/UL (ref 150–450)
POLYCHROMASIA BLD QL SMEAR: ABNORMAL
POTASSIUM SERPL-SCNC: 3.8 MMOL/L (ref 3.5–5.3)
PROT SERPL-MCNC: 7.4 G/DL (ref 6.4–8.2)
RBC # BLD AUTO: 3.77 X10*6/UL (ref 4–5.2)
RBC MORPH BLD: ABNORMAL
SODIUM SERPL-SCNC: 136 MMOL/L (ref 136–145)
STOMATOCYTES BLD QL SMEAR: ABNORMAL
TOTAL CELLS COUNTED BLD: 100
WBC # BLD AUTO: 2 X10*3/UL (ref 4.4–11.3)

## 2025-08-07 PROCEDURE — 36415 COLL VENOUS BLD VENIPUNCTURE: CPT

## 2025-08-07 PROCEDURE — 80053 COMPREHEN METABOLIC PANEL: CPT

## 2025-08-07 PROCEDURE — 96402 CHEMO HORMON ANTINEOPL SQ/IM: CPT

## 2025-08-07 PROCEDURE — 2500000004 HC RX 250 GENERAL PHARMACY W/ HCPCS (ALT 636 FOR OP/ED): Mod: JZ,TB | Performed by: INTERNAL MEDICINE

## 2025-08-07 PROCEDURE — 85007 BL SMEAR W/DIFF WBC COUNT: CPT

## 2025-08-07 PROCEDURE — 85027 COMPLETE CBC AUTOMATED: CPT

## 2025-08-07 RX ORDER — PROCHLORPERAZINE EDISYLATE 5 MG/ML
10 INJECTION INTRAMUSCULAR; INTRAVENOUS EVERY 6 HOURS PRN
Status: DISCONTINUED | OUTPATIENT
Start: 2025-08-07 | End: 2025-08-07 | Stop reason: HOSPADM

## 2025-08-07 RX ORDER — EPINEPHRINE 0.3 MG/.3ML
0.3 INJECTION SUBCUTANEOUS EVERY 5 MIN PRN
Status: DISCONTINUED | OUTPATIENT
Start: 2025-08-07 | End: 2025-08-07 | Stop reason: HOSPADM

## 2025-08-07 RX ORDER — ALBUTEROL SULFATE 0.83 MG/ML
3 SOLUTION RESPIRATORY (INHALATION) AS NEEDED
Status: DISCONTINUED | OUTPATIENT
Start: 2025-08-07 | End: 2025-08-07 | Stop reason: HOSPADM

## 2025-08-07 RX ORDER — PROCHLORPERAZINE MALEATE 10 MG
10 TABLET ORAL EVERY 6 HOURS PRN
Status: DISCONTINUED | OUTPATIENT
Start: 2025-08-07 | End: 2025-08-07 | Stop reason: HOSPADM

## 2025-08-07 RX ORDER — DIPHENHYDRAMINE HYDROCHLORIDE 50 MG/ML
50 INJECTION, SOLUTION INTRAMUSCULAR; INTRAVENOUS AS NEEDED
Status: DISCONTINUED | OUTPATIENT
Start: 2025-08-07 | End: 2025-08-07 | Stop reason: HOSPADM

## 2025-08-07 RX ORDER — FAMOTIDINE 10 MG/ML
20 INJECTION, SOLUTION INTRAVENOUS ONCE AS NEEDED
Status: DISCONTINUED | OUTPATIENT
Start: 2025-08-07 | End: 2025-08-07 | Stop reason: HOSPADM

## 2025-08-07 RX ORDER — LAMOTRIGINE 25 MG/1
500 TABLET ORAL ONCE
Status: COMPLETED | OUTPATIENT
Start: 2025-08-07 | End: 2025-08-07

## 2025-08-07 RX ADMIN — FULVESTRANT 500 MG: 50 INJECTION INTRAMUSCULAR at 10:07

## 2025-08-07 ASSESSMENT — PAIN SCALES - GENERAL: PAINLEVEL_OUTOF10: 0-NO PAIN

## 2025-08-11 ENCOUNTER — PATIENT MESSAGE (OUTPATIENT)
Dept: HEMATOLOGY/ONCOLOGY | Facility: CLINIC | Age: 75
End: 2025-08-11

## 2025-08-11 ENCOUNTER — OFFICE VISIT (OUTPATIENT)
Facility: CLINIC | Age: 75
End: 2025-08-11
Payer: COMMERCIAL

## 2025-08-11 VITALS
RESPIRATION RATE: 18 BRPM | BODY MASS INDEX: 30.4 KG/M2 | HEIGHT: 61 IN | DIASTOLIC BLOOD PRESSURE: 88 MMHG | HEART RATE: 76 BPM | SYSTOLIC BLOOD PRESSURE: 167 MMHG | WEIGHT: 161 LBS | OXYGEN SATURATION: 98 %

## 2025-08-11 DIAGNOSIS — G62.0 CHEMOTHERAPY-INDUCED PERIPHERAL NEUROPATHY (MULTI): Primary | ICD-10-CM

## 2025-08-11 DIAGNOSIS — T45.1X5A CHEMOTHERAPY-INDUCED PERIPHERAL NEUROPATHY (MULTI): Primary | ICD-10-CM

## 2025-08-11 PROCEDURE — 3008F BODY MASS INDEX DOCD: CPT | Performed by: PHYSICIAN ASSISTANT

## 2025-08-11 PROCEDURE — 1160F RVW MEDS BY RX/DR IN RCRD: CPT | Performed by: PHYSICIAN ASSISTANT

## 2025-08-11 PROCEDURE — 3077F SYST BP >= 140 MM HG: CPT | Performed by: PHYSICIAN ASSISTANT

## 2025-08-11 PROCEDURE — 99215 OFFICE O/P EST HI 40 MIN: CPT | Performed by: PHYSICIAN ASSISTANT

## 2025-08-11 PROCEDURE — 1125F AMNT PAIN NOTED PAIN PRSNT: CPT | Performed by: PHYSICIAN ASSISTANT

## 2025-08-11 PROCEDURE — 99213 OFFICE O/P EST LOW 20 MIN: CPT

## 2025-08-11 PROCEDURE — 3079F DIAST BP 80-89 MM HG: CPT | Performed by: PHYSICIAN ASSISTANT

## 2025-08-11 PROCEDURE — 1159F MED LIST DOCD IN RCRD: CPT | Performed by: PHYSICIAN ASSISTANT

## 2025-08-11 ASSESSMENT — ENCOUNTER SYMPTOMS
PSYCHIATRIC NEGATIVE: 1
CARDIOVASCULAR NEGATIVE: 1
NUMBNESS: 1
ALLERGIC/IMMUNOLOGIC NEGATIVE: 1
EYES NEGATIVE: 1
MUSCULOSKELETAL NEGATIVE: 1
ENDOCRINE NEGATIVE: 1
CONSTITUTIONAL NEGATIVE: 1
GASTROINTESTINAL NEGATIVE: 1
HEMATOLOGIC/LYMPHATIC NEGATIVE: 1
RESPIRATORY NEGATIVE: 1

## 2025-08-11 ASSESSMENT — LIFESTYLE VARIABLES
HOW OFTEN DO YOU HAVE SIX OR MORE DRINKS ON ONE OCCASION: NEVER
HOW MANY STANDARD DRINKS CONTAINING ALCOHOL DO YOU HAVE ON A TYPICAL DAY: PATIENT DOES NOT DRINK
SKIP TO QUESTIONS 9-10: 1
AUDIT-C TOTAL SCORE: 0
HOW OFTEN DO YOU HAVE A DRINK CONTAINING ALCOHOL: NEVER

## 2025-08-11 ASSESSMENT — PAIN SCALES - GENERAL
PAINLEVEL_OUTOF10: 8
PAINLEVEL_OUTOF10: 8

## 2025-08-11 ASSESSMENT — PAIN - FUNCTIONAL ASSESSMENT: PAIN_FUNCTIONAL_ASSESSMENT: 0-10

## 2025-08-11 ASSESSMENT — PATIENT HEALTH QUESTIONNAIRE - PHQ9
SUM OF ALL RESPONSES TO PHQ9 QUESTIONS 1 & 2: 0
2. FEELING DOWN, DEPRESSED OR HOPELESS: NOT AT ALL
1. LITTLE INTEREST OR PLEASURE IN DOING THINGS: NOT AT ALL

## 2025-08-12 ENCOUNTER — OFFICE VISIT (OUTPATIENT)
Facility: CLINIC | Age: 75
End: 2025-08-12
Payer: COMMERCIAL

## 2025-08-12 VITALS
BODY MASS INDEX: 30.4 KG/M2 | SYSTOLIC BLOOD PRESSURE: 145 MMHG | DIASTOLIC BLOOD PRESSURE: 78 MMHG | OXYGEN SATURATION: 100 % | RESPIRATION RATE: 18 BRPM | WEIGHT: 161 LBS | HEIGHT: 61 IN | HEART RATE: 83 BPM

## 2025-08-12 DIAGNOSIS — G62.0 CHEMOTHERAPY-INDUCED PERIPHERAL NEUROPATHY (MULTI): Primary | ICD-10-CM

## 2025-08-12 DIAGNOSIS — T45.1X5A CHEMOTHERAPY-INDUCED PERIPHERAL NEUROPATHY (MULTI): Primary | ICD-10-CM

## 2025-08-12 PROCEDURE — 99215 OFFICE O/P EST HI 40 MIN: CPT | Performed by: PHYSICIAN ASSISTANT

## 2025-08-12 PROCEDURE — 99213 OFFICE O/P EST LOW 20 MIN: CPT

## 2025-08-12 PROCEDURE — 1160F RVW MEDS BY RX/DR IN RCRD: CPT | Performed by: PHYSICIAN ASSISTANT

## 2025-08-12 PROCEDURE — 3077F SYST BP >= 140 MM HG: CPT | Performed by: PHYSICIAN ASSISTANT

## 2025-08-12 PROCEDURE — 1159F MED LIST DOCD IN RCRD: CPT | Performed by: PHYSICIAN ASSISTANT

## 2025-08-12 PROCEDURE — 1125F AMNT PAIN NOTED PAIN PRSNT: CPT | Performed by: PHYSICIAN ASSISTANT

## 2025-08-12 PROCEDURE — 3008F BODY MASS INDEX DOCD: CPT | Performed by: PHYSICIAN ASSISTANT

## 2025-08-12 PROCEDURE — 3078F DIAST BP <80 MM HG: CPT | Performed by: PHYSICIAN ASSISTANT

## 2025-08-12 ASSESSMENT — LIFESTYLE VARIABLES
AUDIT-C TOTAL SCORE: 0
HOW OFTEN DO YOU HAVE A DRINK CONTAINING ALCOHOL: NEVER
HOW OFTEN DO YOU HAVE SIX OR MORE DRINKS ON ONE OCCASION: NEVER
HOW MANY STANDARD DRINKS CONTAINING ALCOHOL DO YOU HAVE ON A TYPICAL DAY: PATIENT DOES NOT DRINK
SKIP TO QUESTIONS 9-10: 1

## 2025-08-12 ASSESSMENT — ENCOUNTER SYMPTOMS
GASTROINTESTINAL NEGATIVE: 1
ALLERGIC/IMMUNOLOGIC NEGATIVE: 1
PSYCHIATRIC NEGATIVE: 1
RESPIRATORY NEGATIVE: 1
ENDOCRINE NEGATIVE: 1
MUSCULOSKELETAL NEGATIVE: 1
NUMBNESS: 1
HEMATOLOGIC/LYMPHATIC NEGATIVE: 1
CARDIOVASCULAR NEGATIVE: 1
CONSTITUTIONAL NEGATIVE: 1
EYES NEGATIVE: 1

## 2025-08-12 ASSESSMENT — PAIN SCALES - GENERAL
PAINLEVEL_OUTOF10: 7
PAINLEVEL_OUTOF10: 7

## 2025-08-12 ASSESSMENT — PATIENT HEALTH QUESTIONNAIRE - PHQ9
SUM OF ALL RESPONSES TO PHQ9 QUESTIONS 1 & 2: 0
1. LITTLE INTEREST OR PLEASURE IN DOING THINGS: NOT AT ALL
2. FEELING DOWN, DEPRESSED OR HOPELESS: NOT AT ALL

## 2025-08-12 ASSESSMENT — PAIN - FUNCTIONAL ASSESSMENT: PAIN_FUNCTIONAL_ASSESSMENT: 0-10

## 2025-08-13 ENCOUNTER — OFFICE VISIT (OUTPATIENT)
Facility: CLINIC | Age: 75
End: 2025-08-13
Payer: COMMERCIAL

## 2025-08-13 VITALS
SYSTOLIC BLOOD PRESSURE: 151 MMHG | WEIGHT: 161 LBS | HEIGHT: 61 IN | HEART RATE: 87 BPM | DIASTOLIC BLOOD PRESSURE: 96 MMHG | BODY MASS INDEX: 30.4 KG/M2 | OXYGEN SATURATION: 97 % | RESPIRATION RATE: 18 BRPM

## 2025-08-13 DIAGNOSIS — G62.0 CHEMOTHERAPY-INDUCED PERIPHERAL NEUROPATHY (MULTI): Primary | ICD-10-CM

## 2025-08-13 DIAGNOSIS — T45.1X5A CHEMOTHERAPY-INDUCED PERIPHERAL NEUROPATHY (MULTI): Primary | ICD-10-CM

## 2025-08-13 PROCEDURE — 3008F BODY MASS INDEX DOCD: CPT | Performed by: PHYSICIAN ASSISTANT

## 2025-08-13 PROCEDURE — 99213 OFFICE O/P EST LOW 20 MIN: CPT

## 2025-08-13 PROCEDURE — 1125F AMNT PAIN NOTED PAIN PRSNT: CPT | Performed by: PHYSICIAN ASSISTANT

## 2025-08-13 PROCEDURE — 3080F DIAST BP >= 90 MM HG: CPT | Performed by: PHYSICIAN ASSISTANT

## 2025-08-13 PROCEDURE — 1159F MED LIST DOCD IN RCRD: CPT | Performed by: PHYSICIAN ASSISTANT

## 2025-08-13 PROCEDURE — 99215 OFFICE O/P EST HI 40 MIN: CPT | Performed by: PHYSICIAN ASSISTANT

## 2025-08-13 PROCEDURE — 3077F SYST BP >= 140 MM HG: CPT | Performed by: PHYSICIAN ASSISTANT

## 2025-08-13 ASSESSMENT — PAIN DESCRIPTION - DESCRIPTORS: DESCRIPTORS: TINGLING;NUMBNESS

## 2025-08-13 ASSESSMENT — PATIENT HEALTH QUESTIONNAIRE - PHQ9
1. LITTLE INTEREST OR PLEASURE IN DOING THINGS: NOT AT ALL
2. FEELING DOWN, DEPRESSED OR HOPELESS: NOT AT ALL
SUM OF ALL RESPONSES TO PHQ9 QUESTIONS 1 & 2: 0

## 2025-08-13 ASSESSMENT — LIFESTYLE VARIABLES
HOW OFTEN DO YOU HAVE A DRINK CONTAINING ALCOHOL: NEVER
SKIP TO QUESTIONS 9-10: 1
HOW MANY STANDARD DRINKS CONTAINING ALCOHOL DO YOU HAVE ON A TYPICAL DAY: PATIENT DOES NOT DRINK
HOW OFTEN DO YOU HAVE SIX OR MORE DRINKS ON ONE OCCASION: NEVER
AUDIT-C TOTAL SCORE: 0

## 2025-08-13 ASSESSMENT — PAIN SCALES - GENERAL
PAINLEVEL_OUTOF10: 6
PAINLEVEL_OUTOF10: 6

## 2025-08-13 ASSESSMENT — ENCOUNTER SYMPTOMS
HEMATOLOGIC/LYMPHATIC NEGATIVE: 1
GASTROINTESTINAL NEGATIVE: 1
CARDIOVASCULAR NEGATIVE: 1
ENDOCRINE NEGATIVE: 1
NUMBNESS: 1
MUSCULOSKELETAL NEGATIVE: 1
RESPIRATORY NEGATIVE: 1
PAIN: 1
PSYCHIATRIC NEGATIVE: 1
EYES NEGATIVE: 1
CONSTITUTIONAL NEGATIVE: 1
ALLERGIC/IMMUNOLOGIC NEGATIVE: 1

## 2025-08-13 ASSESSMENT — PAIN - FUNCTIONAL ASSESSMENT: PAIN_FUNCTIONAL_ASSESSMENT: 0-10

## 2025-08-14 ENCOUNTER — OFFICE VISIT (OUTPATIENT)
Facility: CLINIC | Age: 75
End: 2025-08-14
Payer: COMMERCIAL

## 2025-08-14 VITALS
DIASTOLIC BLOOD PRESSURE: 93 MMHG | HEIGHT: 61 IN | WEIGHT: 161 LBS | HEART RATE: 80 BPM | OXYGEN SATURATION: 99 % | SYSTOLIC BLOOD PRESSURE: 185 MMHG | BODY MASS INDEX: 30.4 KG/M2 | RESPIRATION RATE: 18 BRPM

## 2025-08-14 DIAGNOSIS — G62.0 CHEMOTHERAPY-INDUCED PERIPHERAL NEUROPATHY (MULTI): Primary | ICD-10-CM

## 2025-08-14 DIAGNOSIS — T45.1X5A CHEMOTHERAPY-INDUCED PERIPHERAL NEUROPATHY (MULTI): Primary | ICD-10-CM

## 2025-08-14 PROCEDURE — 1159F MED LIST DOCD IN RCRD: CPT | Performed by: PHYSICIAN ASSISTANT

## 2025-08-14 PROCEDURE — 3080F DIAST BP >= 90 MM HG: CPT | Performed by: PHYSICIAN ASSISTANT

## 2025-08-14 PROCEDURE — 3077F SYST BP >= 140 MM HG: CPT | Performed by: PHYSICIAN ASSISTANT

## 2025-08-14 PROCEDURE — 99215 OFFICE O/P EST HI 40 MIN: CPT | Performed by: PHYSICIAN ASSISTANT

## 2025-08-14 PROCEDURE — 1160F RVW MEDS BY RX/DR IN RCRD: CPT | Performed by: PHYSICIAN ASSISTANT

## 2025-08-14 PROCEDURE — 99213 OFFICE O/P EST LOW 20 MIN: CPT

## 2025-08-14 PROCEDURE — 1125F AMNT PAIN NOTED PAIN PRSNT: CPT | Performed by: PHYSICIAN ASSISTANT

## 2025-08-14 PROCEDURE — 3008F BODY MASS INDEX DOCD: CPT | Performed by: PHYSICIAN ASSISTANT

## 2025-08-14 ASSESSMENT — ENCOUNTER SYMPTOMS
CONSTITUTIONAL NEGATIVE: 1
NUMBNESS: 1
ENDOCRINE NEGATIVE: 1
EYES NEGATIVE: 1
PSYCHIATRIC NEGATIVE: 1
ALLERGIC/IMMUNOLOGIC NEGATIVE: 1
GASTROINTESTINAL NEGATIVE: 1
HEMATOLOGIC/LYMPHATIC NEGATIVE: 1
RESPIRATORY NEGATIVE: 1
CARDIOVASCULAR NEGATIVE: 1
MUSCULOSKELETAL NEGATIVE: 1
PAIN: 1

## 2025-08-14 ASSESSMENT — PAIN - FUNCTIONAL ASSESSMENT: PAIN_FUNCTIONAL_ASSESSMENT: 0-10

## 2025-08-14 ASSESSMENT — PATIENT HEALTH QUESTIONNAIRE - PHQ9
2. FEELING DOWN, DEPRESSED OR HOPELESS: NOT AT ALL
SUM OF ALL RESPONSES TO PHQ9 QUESTIONS 1 & 2: 0
1. LITTLE INTEREST OR PLEASURE IN DOING THINGS: NOT AT ALL

## 2025-08-14 ASSESSMENT — PAIN SCALES - GENERAL
PAINLEVEL_OUTOF10: 6
PAINLEVEL_OUTOF10: 6

## 2025-08-14 ASSESSMENT — LIFESTYLE VARIABLES
HOW OFTEN DO YOU HAVE SIX OR MORE DRINKS ON ONE OCCASION: NEVER
HOW OFTEN DO YOU HAVE A DRINK CONTAINING ALCOHOL: NEVER
AUDIT-C TOTAL SCORE: 0
SKIP TO QUESTIONS 9-10: 1
HOW MANY STANDARD DRINKS CONTAINING ALCOHOL DO YOU HAVE ON A TYPICAL DAY: PATIENT DOES NOT DRINK

## 2025-08-14 ASSESSMENT — PAIN DESCRIPTION - DESCRIPTORS: DESCRIPTORS: NUMBNESS

## 2025-08-15 ENCOUNTER — OFFICE VISIT (OUTPATIENT)
Facility: CLINIC | Age: 75
End: 2025-08-15
Payer: COMMERCIAL

## 2025-08-15 VITALS
WEIGHT: 161 LBS | HEART RATE: 84 BPM | DIASTOLIC BLOOD PRESSURE: 76 MMHG | RESPIRATION RATE: 18 BRPM | OXYGEN SATURATION: 100 % | BODY MASS INDEX: 30.4 KG/M2 | HEIGHT: 61 IN | SYSTOLIC BLOOD PRESSURE: 133 MMHG

## 2025-08-15 DIAGNOSIS — G62.0 CHEMOTHERAPY-INDUCED PERIPHERAL NEUROPATHY (MULTI): Primary | ICD-10-CM

## 2025-08-15 DIAGNOSIS — T45.1X5A CHEMOTHERAPY-INDUCED PERIPHERAL NEUROPATHY (MULTI): Primary | ICD-10-CM

## 2025-08-15 PROCEDURE — 99215 OFFICE O/P EST HI 40 MIN: CPT | Performed by: PHYSICIAN ASSISTANT

## 2025-08-15 PROCEDURE — 3078F DIAST BP <80 MM HG: CPT | Performed by: PHYSICIAN ASSISTANT

## 2025-08-15 PROCEDURE — 1160F RVW MEDS BY RX/DR IN RCRD: CPT | Performed by: PHYSICIAN ASSISTANT

## 2025-08-15 PROCEDURE — 3075F SYST BP GE 130 - 139MM HG: CPT | Performed by: PHYSICIAN ASSISTANT

## 2025-08-15 PROCEDURE — 1159F MED LIST DOCD IN RCRD: CPT | Performed by: PHYSICIAN ASSISTANT

## 2025-08-15 PROCEDURE — 3008F BODY MASS INDEX DOCD: CPT | Performed by: PHYSICIAN ASSISTANT

## 2025-08-15 PROCEDURE — 99212 OFFICE O/P EST SF 10 MIN: CPT

## 2025-08-15 ASSESSMENT — LIFESTYLE VARIABLES
HOW OFTEN DO YOU HAVE A DRINK CONTAINING ALCOHOL: NEVER
HOW MANY STANDARD DRINKS CONTAINING ALCOHOL DO YOU HAVE ON A TYPICAL DAY: PATIENT DOES NOT DRINK
HOW OFTEN DO YOU HAVE SIX OR MORE DRINKS ON ONE OCCASION: NEVER
SKIP TO QUESTIONS 9-10: 1
AUDIT-C TOTAL SCORE: 0

## 2025-08-15 ASSESSMENT — ENCOUNTER SYMPTOMS
GASTROINTESTINAL NEGATIVE: 1
EYES NEGATIVE: 1
RESPIRATORY NEGATIVE: 1
MUSCULOSKELETAL NEGATIVE: 1
CARDIOVASCULAR NEGATIVE: 1
PSYCHIATRIC NEGATIVE: 1
ALLERGIC/IMMUNOLOGIC NEGATIVE: 1
NUMBNESS: 1
CONSTITUTIONAL NEGATIVE: 1
PAIN: 1
ENDOCRINE NEGATIVE: 1
HEMATOLOGIC/LYMPHATIC NEGATIVE: 1

## 2025-08-15 ASSESSMENT — PAIN DESCRIPTION - DESCRIPTORS: DESCRIPTORS: NUMBNESS

## 2025-08-15 ASSESSMENT — PAIN SCALES - GENERAL
PAINLEVEL_OUTOF10: 5
PAINLEVEL_OUTOF10: 5 - MODERATE PAIN

## 2025-08-15 ASSESSMENT — PAIN - FUNCTIONAL ASSESSMENT: PAIN_FUNCTIONAL_ASSESSMENT: 0-10

## 2025-08-18 ENCOUNTER — OFFICE VISIT (OUTPATIENT)
Facility: CLINIC | Age: 75
End: 2025-08-18
Payer: COMMERCIAL

## 2025-08-18 VITALS
WEIGHT: 161 LBS | OXYGEN SATURATION: 98 % | DIASTOLIC BLOOD PRESSURE: 78 MMHG | HEIGHT: 61 IN | RESPIRATION RATE: 20 BRPM | BODY MASS INDEX: 30.4 KG/M2 | SYSTOLIC BLOOD PRESSURE: 137 MMHG | HEART RATE: 88 BPM

## 2025-08-18 DIAGNOSIS — G62.0 CHEMOTHERAPY-INDUCED PERIPHERAL NEUROPATHY (MULTI): Primary | ICD-10-CM

## 2025-08-18 DIAGNOSIS — T45.1X5A CHEMOTHERAPY-INDUCED PERIPHERAL NEUROPATHY (MULTI): Primary | ICD-10-CM

## 2025-08-18 PROCEDURE — 3075F SYST BP GE 130 - 139MM HG: CPT | Performed by: PHYSICIAN ASSISTANT

## 2025-08-18 PROCEDURE — 3008F BODY MASS INDEX DOCD: CPT | Performed by: PHYSICIAN ASSISTANT

## 2025-08-18 PROCEDURE — 99213 OFFICE O/P EST LOW 20 MIN: CPT

## 2025-08-18 PROCEDURE — 1159F MED LIST DOCD IN RCRD: CPT | Performed by: PHYSICIAN ASSISTANT

## 2025-08-18 PROCEDURE — 3078F DIAST BP <80 MM HG: CPT | Performed by: PHYSICIAN ASSISTANT

## 2025-08-18 PROCEDURE — 99215 OFFICE O/P EST HI 40 MIN: CPT | Performed by: PHYSICIAN ASSISTANT

## 2025-08-18 PROCEDURE — 1160F RVW MEDS BY RX/DR IN RCRD: CPT | Performed by: PHYSICIAN ASSISTANT

## 2025-08-18 ASSESSMENT — ENCOUNTER SYMPTOMS
EYES NEGATIVE: 1
MUSCULOSKELETAL NEGATIVE: 1
NUMBNESS: 1
ENDOCRINE NEGATIVE: 1
PSYCHIATRIC NEGATIVE: 1
HEMATOLOGIC/LYMPHATIC NEGATIVE: 1
RESPIRATORY NEGATIVE: 1
PAIN: 1
ALLERGIC/IMMUNOLOGIC NEGATIVE: 1
GASTROINTESTINAL NEGATIVE: 1
CARDIOVASCULAR NEGATIVE: 1
CONSTITUTIONAL NEGATIVE: 1

## 2025-08-18 ASSESSMENT — PAIN SCALES - GENERAL
PAINLEVEL_OUTOF10: 5 - MODERATE PAIN
PAINLEVEL_OUTOF10: 5

## 2025-08-18 ASSESSMENT — PAIN DESCRIPTION - DESCRIPTORS: DESCRIPTORS: NUMBNESS;TINGLING

## 2025-08-18 ASSESSMENT — PAIN - FUNCTIONAL ASSESSMENT: PAIN_FUNCTIONAL_ASSESSMENT: 0-10

## 2025-08-19 ENCOUNTER — OFFICE VISIT (OUTPATIENT)
Facility: CLINIC | Age: 75
End: 2025-08-19
Payer: COMMERCIAL

## 2025-08-19 ENCOUNTER — APPOINTMENT (OUTPATIENT)
Dept: HEMATOLOGY/ONCOLOGY | Facility: CLINIC | Age: 75
End: 2025-08-19
Payer: COMMERCIAL

## 2025-08-19 VITALS
DIASTOLIC BLOOD PRESSURE: 84 MMHG | HEART RATE: 90 BPM | RESPIRATION RATE: 16 BRPM | BODY MASS INDEX: 30.4 KG/M2 | WEIGHT: 161 LBS | OXYGEN SATURATION: 97 % | HEIGHT: 61 IN | SYSTOLIC BLOOD PRESSURE: 161 MMHG

## 2025-08-19 DIAGNOSIS — G62.0 CHEMOTHERAPY-INDUCED PERIPHERAL NEUROPATHY (MULTI): Primary | ICD-10-CM

## 2025-08-19 DIAGNOSIS — T45.1X5A CHEMOTHERAPY-INDUCED PERIPHERAL NEUROPATHY (MULTI): Primary | ICD-10-CM

## 2025-08-19 PROCEDURE — 1159F MED LIST DOCD IN RCRD: CPT | Performed by: PHYSICIAN ASSISTANT

## 2025-08-19 PROCEDURE — 99215 OFFICE O/P EST HI 40 MIN: CPT | Performed by: PHYSICIAN ASSISTANT

## 2025-08-19 PROCEDURE — 1160F RVW MEDS BY RX/DR IN RCRD: CPT | Performed by: PHYSICIAN ASSISTANT

## 2025-08-19 PROCEDURE — 3077F SYST BP >= 140 MM HG: CPT | Performed by: PHYSICIAN ASSISTANT

## 2025-08-19 PROCEDURE — 99213 OFFICE O/P EST LOW 20 MIN: CPT

## 2025-08-19 PROCEDURE — 3008F BODY MASS INDEX DOCD: CPT | Performed by: PHYSICIAN ASSISTANT

## 2025-08-19 PROCEDURE — 1125F AMNT PAIN NOTED PAIN PRSNT: CPT | Performed by: PHYSICIAN ASSISTANT

## 2025-08-19 PROCEDURE — 3079F DIAST BP 80-89 MM HG: CPT | Performed by: PHYSICIAN ASSISTANT

## 2025-08-19 ASSESSMENT — ENCOUNTER SYMPTOMS
GASTROINTESTINAL NEGATIVE: 1
ENDOCRINE NEGATIVE: 1
RESPIRATORY NEGATIVE: 1
CONSTITUTIONAL NEGATIVE: 1
PAIN: 1
ALLERGIC/IMMUNOLOGIC NEGATIVE: 1
CARDIOVASCULAR NEGATIVE: 1
MUSCULOSKELETAL NEGATIVE: 1
PSYCHIATRIC NEGATIVE: 1
HEMATOLOGIC/LYMPHATIC NEGATIVE: 1
NUMBNESS: 1
EYES NEGATIVE: 1

## 2025-08-19 ASSESSMENT — PAIN SCALES - GENERAL
PAINLEVEL_OUTOF10: 4
PAINLEVEL_OUTOF10: 4

## 2025-08-19 ASSESSMENT — PAIN - FUNCTIONAL ASSESSMENT: PAIN_FUNCTIONAL_ASSESSMENT: 0-10

## 2025-08-20 ENCOUNTER — LAB (OUTPATIENT)
Dept: LAB | Facility: CLINIC | Age: 75
End: 2025-08-20
Payer: COMMERCIAL

## 2025-08-20 ENCOUNTER — OFFICE VISIT (OUTPATIENT)
Facility: CLINIC | Age: 75
End: 2025-08-20
Payer: COMMERCIAL

## 2025-08-20 ENCOUNTER — OFFICE VISIT (OUTPATIENT)
Dept: HEMATOLOGY/ONCOLOGY | Facility: CLINIC | Age: 75
End: 2025-08-20
Payer: COMMERCIAL

## 2025-08-20 VITALS
DIASTOLIC BLOOD PRESSURE: 77 MMHG | OXYGEN SATURATION: 100 % | RESPIRATION RATE: 18 BRPM | BODY MASS INDEX: 29.83 KG/M2 | HEART RATE: 86 BPM | HEIGHT: 61 IN | WEIGHT: 158 LBS | SYSTOLIC BLOOD PRESSURE: 114 MMHG

## 2025-08-20 VITALS
SYSTOLIC BLOOD PRESSURE: 185 MMHG | WEIGHT: 158.62 LBS | RESPIRATION RATE: 18 BRPM | DIASTOLIC BLOOD PRESSURE: 98 MMHG | OXYGEN SATURATION: 95 % | BODY MASS INDEX: 29.97 KG/M2 | HEART RATE: 90 BPM | TEMPERATURE: 97 F

## 2025-08-20 DIAGNOSIS — C50.919 MALIGNANT NEOPLASM OF BREAST IN FEMALE, ESTROGEN RECEPTOR POSITIVE, UNSPECIFIED LATERALITY, UNSPECIFIED SITE OF BREAST: ICD-10-CM

## 2025-08-20 DIAGNOSIS — Z17.0 MALIGNANT NEOPLASM OF BREAST IN FEMALE, ESTROGEN RECEPTOR POSITIVE, UNSPECIFIED LATERALITY, UNSPECIFIED SITE OF BREAST: ICD-10-CM

## 2025-08-20 DIAGNOSIS — T45.1X5A CHEMOTHERAPY-INDUCED PERIPHERAL NEUROPATHY (MULTI): Primary | ICD-10-CM

## 2025-08-20 DIAGNOSIS — G62.0 CHEMOTHERAPY-INDUCED PERIPHERAL NEUROPATHY (MULTI): Primary | ICD-10-CM

## 2025-08-20 LAB
ALBUMIN SERPL BCP-MCNC: 4.3 G/DL (ref 3.4–5)
ALP SERPL-CCNC: 46 U/L (ref 33–136)
ALT SERPL W P-5'-P-CCNC: 12 U/L (ref 7–45)
ANION GAP SERPL CALC-SCNC: 11 MMOL/L (ref 10–20)
AST SERPL W P-5'-P-CCNC: 21 U/L (ref 9–39)
BASOPHILS # BLD AUTO: 0.04 X10*3/UL (ref 0–0.1)
BASOPHILS NFR BLD AUTO: 1.6 %
BILIRUB SERPL-MCNC: 1 MG/DL (ref 0–1.2)
BUN SERPL-MCNC: 25 MG/DL (ref 6–23)
CALCIUM SERPL-MCNC: 9.6 MG/DL (ref 8.6–10.3)
CANCER AG27-29 SERPL-ACNC: 234.5 U/ML (ref 0–38.6)
CHLORIDE SERPL-SCNC: 101 MMOL/L (ref 98–107)
CO2 SERPL-SCNC: 29 MMOL/L (ref 21–32)
CREAT SERPL-MCNC: 1.36 MG/DL (ref 0.5–1.05)
EGFRCR SERPLBLD CKD-EPI 2021: 41 ML/MIN/1.73M*2
EOSINOPHIL # BLD AUTO: 0.03 X10*3/UL (ref 0–0.4)
EOSINOPHIL NFR BLD AUTO: 1.2 %
ERYTHROCYTE [DISTWIDTH] IN BLOOD BY AUTOMATED COUNT: 17.4 % (ref 11.5–14.5)
FERRITIN SERPL-MCNC: 1359 NG/ML (ref 8–150)
FOLATE SERPL-MCNC: 13.6 NG/ML
GLUCOSE SERPL-MCNC: 107 MG/DL (ref 74–99)
HCT VFR BLD AUTO: 27.2 % (ref 36–46)
HGB BLD-MCNC: 8.5 G/DL (ref 12–16)
IMM GRANULOCYTES # BLD AUTO: 0.01 X10*3/UL (ref 0–0.5)
IMM GRANULOCYTES NFR BLD AUTO: 0.4 % (ref 0–0.9)
IRON SATN MFR SERPL: 42 % (ref 25–45)
IRON SERPL-MCNC: 170 UG/DL (ref 35–150)
LYMPHOCYTES # BLD AUTO: 0.88 X10*3/UL (ref 0.8–3)
LYMPHOCYTES NFR BLD AUTO: 34.8 %
MCH RBC QN AUTO: 22.7 PG (ref 26–34)
MCHC RBC AUTO-ENTMCNC: 31.3 G/DL (ref 32–36)
MCV RBC AUTO: 73 FL (ref 80–100)
MONOCYTES # BLD AUTO: 0.3 X10*3/UL (ref 0.05–0.8)
MONOCYTES NFR BLD AUTO: 11.9 %
NEUTROPHILS # BLD AUTO: 1.27 X10*3/UL (ref 1.6–5.5)
NEUTROPHILS NFR BLD AUTO: 50.1 %
NRBC BLD-RTO: 0.8 /100 WBCS (ref 0–0)
PLATELET # BLD AUTO: 235 X10*3/UL (ref 150–450)
POTASSIUM SERPL-SCNC: 3.8 MMOL/L (ref 3.5–5.3)
PROT SERPL-MCNC: 7.7 G/DL (ref 6.4–8.2)
RBC # BLD AUTO: 3.74 X10*6/UL (ref 4–5.2)
SODIUM SERPL-SCNC: 137 MMOL/L (ref 136–145)
TIBC SERPL-MCNC: 408 UG/DL (ref 240–445)
UIBC SERPL-MCNC: 238 UG/DL (ref 110–370)
VIT B12 SERPL-MCNC: 379 PG/ML (ref 211–911)
WBC # BLD AUTO: 2.5 X10*3/UL (ref 4.4–11.3)

## 2025-08-20 PROCEDURE — 3077F SYST BP >= 140 MM HG: CPT | Performed by: INTERNAL MEDICINE

## 2025-08-20 PROCEDURE — 3078F DIAST BP <80 MM HG: CPT | Performed by: PHYSICIAN ASSISTANT

## 2025-08-20 PROCEDURE — 3008F BODY MASS INDEX DOCD: CPT | Performed by: PHYSICIAN ASSISTANT

## 2025-08-20 PROCEDURE — 85025 COMPLETE CBC W/AUTO DIFF WBC: CPT

## 2025-08-20 PROCEDURE — 1159F MED LIST DOCD IN RCRD: CPT | Performed by: INTERNAL MEDICINE

## 2025-08-20 PROCEDURE — 80053 COMPREHEN METABOLIC PANEL: CPT

## 2025-08-20 PROCEDURE — 1125F AMNT PAIN NOTED PAIN PRSNT: CPT | Performed by: PHYSICIAN ASSISTANT

## 2025-08-20 PROCEDURE — 82746 ASSAY OF FOLIC ACID SERUM: CPT

## 2025-08-20 PROCEDURE — 99215 OFFICE O/P EST HI 40 MIN: CPT | Performed by: PHYSICIAN ASSISTANT

## 2025-08-20 PROCEDURE — 83550 IRON BINDING TEST: CPT

## 2025-08-20 PROCEDURE — 99215 OFFICE O/P EST HI 40 MIN: CPT | Performed by: INTERNAL MEDICINE

## 2025-08-20 PROCEDURE — 82728 ASSAY OF FERRITIN: CPT

## 2025-08-20 PROCEDURE — 1126F AMNT PAIN NOTED NONE PRSNT: CPT | Performed by: INTERNAL MEDICINE

## 2025-08-20 PROCEDURE — 36415 COLL VENOUS BLD VENIPUNCTURE: CPT

## 2025-08-20 PROCEDURE — 1160F RVW MEDS BY RX/DR IN RCRD: CPT | Performed by: PHYSICIAN ASSISTANT

## 2025-08-20 PROCEDURE — 86300 IMMUNOASSAY TUMOR CA 15-3: CPT

## 2025-08-20 PROCEDURE — 3074F SYST BP LT 130 MM HG: CPT | Performed by: PHYSICIAN ASSISTANT

## 2025-08-20 PROCEDURE — 3080F DIAST BP >= 90 MM HG: CPT | Performed by: INTERNAL MEDICINE

## 2025-08-20 PROCEDURE — 1159F MED LIST DOCD IN RCRD: CPT | Performed by: PHYSICIAN ASSISTANT

## 2025-08-20 PROCEDURE — 82607 VITAMIN B-12: CPT

## 2025-08-20 PROCEDURE — 99213 OFFICE O/P EST LOW 20 MIN: CPT

## 2025-08-20 ASSESSMENT — ENCOUNTER SYMPTOMS
HEMATOLOGIC/LYMPHATIC NEGATIVE: 1
PSYCHIATRIC NEGATIVE: 1
CARDIOVASCULAR NEGATIVE: 1
ALLERGIC/IMMUNOLOGIC NEGATIVE: 1
ENDOCRINE NEGATIVE: 1
CONSTITUTIONAL NEGATIVE: 1
PAIN: 1
GASTROINTESTINAL NEGATIVE: 1
RESPIRATORY NEGATIVE: 1
MUSCULOSKELETAL NEGATIVE: 1
EYES NEGATIVE: 1
NUMBNESS: 1

## 2025-08-20 ASSESSMENT — LIFESTYLE VARIABLES
HOW MANY STANDARD DRINKS CONTAINING ALCOHOL DO YOU HAVE ON A TYPICAL DAY: PATIENT DOES NOT DRINK
SKIP TO QUESTIONS 9-10: 1
AUDIT-C TOTAL SCORE: 0
HOW OFTEN DO YOU HAVE A DRINK CONTAINING ALCOHOL: NEVER
HOW OFTEN DO YOU HAVE SIX OR MORE DRINKS ON ONE OCCASION: NEVER

## 2025-08-20 ASSESSMENT — PAIN SCALES - GENERAL
PAINLEVEL_OUTOF10: 0-NO PAIN
PAINLEVEL_OUTOF10: 4
PAINLEVEL_OUTOF10: 4

## 2025-08-20 ASSESSMENT — PAIN - FUNCTIONAL ASSESSMENT: PAIN_FUNCTIONAL_ASSESSMENT: 0-10

## 2025-08-20 ASSESSMENT — PAIN DESCRIPTION - DESCRIPTORS: DESCRIPTORS: ACHING

## 2025-08-21 ENCOUNTER — APPOINTMENT (OUTPATIENT)
Dept: PRIMARY CARE | Facility: CLINIC | Age: 75
End: 2025-08-21
Payer: COMMERCIAL

## 2025-08-21 ENCOUNTER — OFFICE VISIT (OUTPATIENT)
Facility: CLINIC | Age: 75
End: 2025-08-21
Payer: COMMERCIAL

## 2025-08-21 VITALS
DIASTOLIC BLOOD PRESSURE: 72 MMHG | WEIGHT: 159 LBS | HEIGHT: 61 IN | SYSTOLIC BLOOD PRESSURE: 120 MMHG | BODY MASS INDEX: 30.02 KG/M2 | TEMPERATURE: 97.2 F | HEART RATE: 80 BPM | OXYGEN SATURATION: 99 %

## 2025-08-21 VITALS
DIASTOLIC BLOOD PRESSURE: 73 MMHG | RESPIRATION RATE: 18 BRPM | SYSTOLIC BLOOD PRESSURE: 106 MMHG | HEIGHT: 61 IN | BODY MASS INDEX: 30.02 KG/M2 | WEIGHT: 159 LBS | HEART RATE: 94 BPM | OXYGEN SATURATION: 98 %

## 2025-08-21 DIAGNOSIS — G25.81 RESTLESS LEGS SYNDROME: ICD-10-CM

## 2025-08-21 DIAGNOSIS — I89.0 LYMPHEDEMA OF RIGHT ARM: ICD-10-CM

## 2025-08-21 DIAGNOSIS — T45.1X5A CHEMOTHERAPY-INDUCED PERIPHERAL NEUROPATHY (MULTI): Primary | ICD-10-CM

## 2025-08-21 DIAGNOSIS — T45.1X5A CHEMOTHERAPY-INDUCED PERIPHERAL NEUROPATHY (MULTI): ICD-10-CM

## 2025-08-21 DIAGNOSIS — C18.7 CANCER OF SIGMOID COLON (MULTI): ICD-10-CM

## 2025-08-21 DIAGNOSIS — E78.5 HYPERLIPIDEMIA, UNSPECIFIED HYPERLIPIDEMIA TYPE: ICD-10-CM

## 2025-08-21 DIAGNOSIS — E55.9 VITAMIN D DEFICIENCY: ICD-10-CM

## 2025-08-21 DIAGNOSIS — G62.0 CHEMOTHERAPY-INDUCED PERIPHERAL NEUROPATHY (MULTI): Primary | ICD-10-CM

## 2025-08-21 DIAGNOSIS — G62.0 CHEMOTHERAPY-INDUCED PERIPHERAL NEUROPATHY (MULTI): ICD-10-CM

## 2025-08-21 DIAGNOSIS — Z85.3 HISTORY OF ADENOCARCINOMA OF BREAST: ICD-10-CM

## 2025-08-21 DIAGNOSIS — I10 BENIGN ESSENTIAL HYPERTENSION: ICD-10-CM

## 2025-08-21 DIAGNOSIS — Z76.89 ENCOUNTER TO ESTABLISH CARE WITH NEW PROVIDER: Primary | ICD-10-CM

## 2025-08-21 DIAGNOSIS — M81.0 OSTEOPOROSIS, UNSPECIFIED OSTEOPOROSIS TYPE, UNSPECIFIED PATHOLOGICAL FRACTURE PRESENCE: ICD-10-CM

## 2025-08-21 DIAGNOSIS — R73.03 PREDIABETES: ICD-10-CM

## 2025-08-21 PROBLEM — Z87.312 HISTORY OF STRESS FRACTURE: Status: RESOLVED | Noted: 2023-02-03 | Resolved: 2025-08-21

## 2025-08-21 PROCEDURE — 3078F DIAST BP <80 MM HG: CPT | Performed by: NURSE PRACTITIONER

## 2025-08-21 PROCEDURE — 1160F RVW MEDS BY RX/DR IN RCRD: CPT | Performed by: PHYSICIAN ASSISTANT

## 2025-08-21 PROCEDURE — 3074F SYST BP LT 130 MM HG: CPT | Performed by: PHYSICIAN ASSISTANT

## 2025-08-21 PROCEDURE — 3074F SYST BP LT 130 MM HG: CPT | Performed by: NURSE PRACTITIONER

## 2025-08-21 PROCEDURE — 1125F AMNT PAIN NOTED PAIN PRSNT: CPT | Performed by: PHYSICIAN ASSISTANT

## 2025-08-21 PROCEDURE — 1126F AMNT PAIN NOTED NONE PRSNT: CPT | Performed by: NURSE PRACTITIONER

## 2025-08-21 PROCEDURE — 99215 OFFICE O/P EST HI 40 MIN: CPT | Performed by: PHYSICIAN ASSISTANT

## 2025-08-21 PROCEDURE — 99213 OFFICE O/P EST LOW 20 MIN: CPT

## 2025-08-21 PROCEDURE — 1159F MED LIST DOCD IN RCRD: CPT | Performed by: NURSE PRACTITIONER

## 2025-08-21 PROCEDURE — 99214 OFFICE O/P EST MOD 30 MIN: CPT | Performed by: NURSE PRACTITIONER

## 2025-08-21 PROCEDURE — 3008F BODY MASS INDEX DOCD: CPT | Performed by: NURSE PRACTITIONER

## 2025-08-21 PROCEDURE — 3008F BODY MASS INDEX DOCD: CPT | Performed by: PHYSICIAN ASSISTANT

## 2025-08-21 PROCEDURE — 1159F MED LIST DOCD IN RCRD: CPT | Performed by: PHYSICIAN ASSISTANT

## 2025-08-21 PROCEDURE — 3078F DIAST BP <80 MM HG: CPT | Performed by: PHYSICIAN ASSISTANT

## 2025-08-21 RX ORDER — ROPINIROLE 0.25 MG/1
0.25 TABLET, FILM COATED ORAL 3 TIMES DAILY
Qty: 90 TABLET | Refills: 11 | Status: SHIPPED | OUTPATIENT
Start: 2025-08-21 | End: 2025-08-21

## 2025-08-21 RX ORDER — LISINOPRIL 10 MG/1
10 TABLET ORAL DAILY
Qty: 90 TABLET | Refills: 1 | Status: SHIPPED | OUTPATIENT
Start: 2025-08-21

## 2025-08-21 RX ORDER — FUROSEMIDE 20 MG/1
20 TABLET ORAL DAILY
Qty: 90 TABLET | Refills: 1 | Status: SHIPPED | OUTPATIENT
Start: 2025-08-21

## 2025-08-21 RX ORDER — ROPINIROLE 0.25 MG/1
0.25 TABLET, FILM COATED ORAL NIGHTLY
Qty: 90 TABLET | Refills: 1 | Status: SHIPPED | OUTPATIENT
Start: 2025-08-21 | End: 2026-08-21

## 2025-08-21 RX ORDER — FENOFIBRATE 54 MG/1
54 TABLET ORAL DAILY
Qty: 90 TABLET | Refills: 1 | Status: SHIPPED | OUTPATIENT
Start: 2025-08-21

## 2025-08-21 ASSESSMENT — ENCOUNTER SYMPTOMS
CARDIOVASCULAR NEGATIVE: 1
NUMBNESS: 1
RESPIRATORY NEGATIVE: 1
CONSTITUTIONAL NEGATIVE: 1
PAIN: 1
GASTROINTESTINAL NEGATIVE: 1
MUSCULOSKELETAL NEGATIVE: 1
CONSTITUTIONAL NEGATIVE: 1
CARDIOVASCULAR NEGATIVE: 1
PSYCHIATRIC NEGATIVE: 1
EYES NEGATIVE: 1
RESPIRATORY NEGATIVE: 1
ENDOCRINE NEGATIVE: 1
PSYCHIATRIC NEGATIVE: 1
MUSCULOSKELETAL NEGATIVE: 1
GASTROINTESTINAL NEGATIVE: 1
HEMATOLOGIC/LYMPHATIC NEGATIVE: 1
ALLERGIC/IMMUNOLOGIC NEGATIVE: 1

## 2025-08-21 ASSESSMENT — PAIN SCALES - GENERAL
PAINLEVEL_OUTOF10: 0-NO PAIN
PAINLEVEL_OUTOF10: 4
PAINLEVEL_OUTOF10: 4

## 2025-08-21 ASSESSMENT — LIFESTYLE VARIABLES
AUDIT-C TOTAL SCORE: 0
HOW MANY STANDARD DRINKS CONTAINING ALCOHOL DO YOU HAVE ON A TYPICAL DAY: PATIENT DOES NOT DRINK
SKIP TO QUESTIONS 9-10: 1
HOW OFTEN DO YOU HAVE SIX OR MORE DRINKS ON ONE OCCASION: NEVER
HOW OFTEN DO YOU HAVE A DRINK CONTAINING ALCOHOL: NEVER

## 2025-08-21 ASSESSMENT — PATIENT HEALTH QUESTIONNAIRE - PHQ9
2. FEELING DOWN, DEPRESSED OR HOPELESS: NOT AT ALL
SUM OF ALL RESPONSES TO PHQ9 QUESTIONS 1 AND 2: 0
1. LITTLE INTEREST OR PLEASURE IN DOING THINGS: NOT AT ALL
SUM OF ALL RESPONSES TO PHQ9 QUESTIONS 1 & 2: 0
2. FEELING DOWN, DEPRESSED OR HOPELESS: NOT AT ALL
1. LITTLE INTEREST OR PLEASURE IN DOING THINGS: NOT AT ALL

## 2025-08-21 ASSESSMENT — PAIN - FUNCTIONAL ASSESSMENT: PAIN_FUNCTIONAL_ASSESSMENT: 0-10

## 2025-08-21 ASSESSMENT — PAIN DESCRIPTION - DESCRIPTORS: DESCRIPTORS: NUMBNESS

## 2025-08-22 ENCOUNTER — OFFICE VISIT (OUTPATIENT)
Facility: CLINIC | Age: 75
End: 2025-08-22
Payer: COMMERCIAL

## 2025-08-22 VITALS
BODY MASS INDEX: 30.02 KG/M2 | RESPIRATION RATE: 16 BRPM | SYSTOLIC BLOOD PRESSURE: 149 MMHG | DIASTOLIC BLOOD PRESSURE: 72 MMHG | HEIGHT: 61 IN | OXYGEN SATURATION: 98 % | WEIGHT: 159 LBS | HEART RATE: 96 BPM

## 2025-08-22 DIAGNOSIS — G62.0 CHEMOTHERAPY-INDUCED PERIPHERAL NEUROPATHY (MULTI): Primary | ICD-10-CM

## 2025-08-22 DIAGNOSIS — T45.1X5A CHEMOTHERAPY-INDUCED PERIPHERAL NEUROPATHY (MULTI): Primary | ICD-10-CM

## 2025-08-22 LAB
COMMENTS - MP RESULT TYPE: NORMAL
SCAN RESULT: NORMAL

## 2025-08-22 PROCEDURE — 3077F SYST BP >= 140 MM HG: CPT | Performed by: PHYSICIAN ASSISTANT

## 2025-08-22 PROCEDURE — 3078F DIAST BP <80 MM HG: CPT | Performed by: PHYSICIAN ASSISTANT

## 2025-08-22 PROCEDURE — 99215 OFFICE O/P EST HI 40 MIN: CPT | Performed by: PHYSICIAN ASSISTANT

## 2025-08-22 PROCEDURE — 99213 OFFICE O/P EST LOW 20 MIN: CPT

## 2025-08-22 PROCEDURE — 1159F MED LIST DOCD IN RCRD: CPT | Performed by: PHYSICIAN ASSISTANT

## 2025-08-22 PROCEDURE — 1160F RVW MEDS BY RX/DR IN RCRD: CPT | Performed by: PHYSICIAN ASSISTANT

## 2025-08-22 PROCEDURE — 3008F BODY MASS INDEX DOCD: CPT | Performed by: PHYSICIAN ASSISTANT

## 2025-08-22 PROCEDURE — 1125F AMNT PAIN NOTED PAIN PRSNT: CPT | Performed by: PHYSICIAN ASSISTANT

## 2025-08-22 ASSESSMENT — ENCOUNTER SYMPTOMS
CONSTITUTIONAL NEGATIVE: 1
MUSCULOSKELETAL NEGATIVE: 1
CARDIOVASCULAR NEGATIVE: 1
RESPIRATORY NEGATIVE: 1
HEMATOLOGIC/LYMPHATIC NEGATIVE: 1
ENDOCRINE NEGATIVE: 1
GASTROINTESTINAL NEGATIVE: 1
EYES NEGATIVE: 1
NUMBNESS: 1
ALLERGIC/IMMUNOLOGIC NEGATIVE: 1
PAIN: 1
PSYCHIATRIC NEGATIVE: 1

## 2025-08-22 ASSESSMENT — PATIENT HEALTH QUESTIONNAIRE - PHQ9
2. FEELING DOWN, DEPRESSED OR HOPELESS: NOT AT ALL
SUM OF ALL RESPONSES TO PHQ9 QUESTIONS 1 AND 2: 0
1. LITTLE INTEREST OR PLEASURE IN DOING THINGS: NOT AT ALL

## 2025-08-22 ASSESSMENT — PAIN SCALES - GENERAL
PAINLEVEL_OUTOF10: 3
PAINLEVEL_OUTOF10: 3

## 2025-08-22 ASSESSMENT — PAIN DESCRIPTION - DESCRIPTORS: DESCRIPTORS: ACHING

## 2025-08-22 ASSESSMENT — PAIN - FUNCTIONAL ASSESSMENT: PAIN_FUNCTIONAL_ASSESSMENT: 0-10

## 2025-08-26 DIAGNOSIS — Z17.0 MALIGNANT NEOPLASM OF BREAST IN FEMALE, ESTROGEN RECEPTOR POSITIVE, UNSPECIFIED LATERALITY, UNSPECIFIED SITE OF BREAST: ICD-10-CM

## 2025-08-26 DIAGNOSIS — C50.919 MALIGNANT NEOPLASM OF BREAST IN FEMALE, ESTROGEN RECEPTOR POSITIVE, UNSPECIFIED LATERALITY, UNSPECIFIED SITE OF BREAST: ICD-10-CM

## 2025-08-29 ENCOUNTER — TELEPHONE (OUTPATIENT)
Dept: HEMATOLOGY/ONCOLOGY | Facility: CLINIC | Age: 75
End: 2025-08-29
Payer: COMMERCIAL

## 2025-09-02 ENCOUNTER — TELEPHONE (OUTPATIENT)
Dept: HEMATOLOGY/ONCOLOGY | Facility: CLINIC | Age: 75
End: 2025-09-02
Payer: COMMERCIAL

## 2025-09-04 ENCOUNTER — APPOINTMENT (OUTPATIENT)
Dept: RADIOLOGY | Facility: CLINIC | Age: 75
End: 2025-09-04
Payer: COMMERCIAL

## 2025-09-04 ENCOUNTER — INFUSION (OUTPATIENT)
Dept: HEMATOLOGY/ONCOLOGY | Facility: CLINIC | Age: 75
End: 2025-09-04
Payer: COMMERCIAL

## 2025-09-04 VITALS
RESPIRATION RATE: 18 BRPM | BODY MASS INDEX: 30.45 KG/M2 | HEART RATE: 83 BPM | WEIGHT: 161.16 LBS | OXYGEN SATURATION: 96 % | TEMPERATURE: 96.8 F

## 2025-09-04 DIAGNOSIS — C50.919 MALIGNANT NEOPLASM OF FEMALE BREAST, UNSPECIFIED ESTROGEN RECEPTOR STATUS, UNSPECIFIED LATERALITY, UNSPECIFIED SITE OF BREAST: ICD-10-CM

## 2025-09-04 PROCEDURE — 2500000004 HC RX 250 GENERAL PHARMACY W/ HCPCS (ALT 636 FOR OP/ED): Mod: JZ,TB | Performed by: INTERNAL MEDICINE

## 2025-09-04 PROCEDURE — 96402 CHEMO HORMON ANTINEOPL SQ/IM: CPT

## 2025-09-04 RX ORDER — ALBUTEROL SULFATE 0.83 MG/ML
3 SOLUTION RESPIRATORY (INHALATION) AS NEEDED
Status: DISCONTINUED | OUTPATIENT
Start: 2025-09-04 | End: 2025-09-04 | Stop reason: HOSPADM

## 2025-09-04 RX ORDER — EPINEPHRINE 0.3 MG/.3ML
0.3 INJECTION SUBCUTANEOUS EVERY 5 MIN PRN
Status: DISCONTINUED | OUTPATIENT
Start: 2025-09-04 | End: 2025-09-04 | Stop reason: HOSPADM

## 2025-09-04 RX ORDER — PROCHLORPERAZINE MALEATE 10 MG
10 TABLET ORAL EVERY 6 HOURS PRN
Status: DISCONTINUED | OUTPATIENT
Start: 2025-09-04 | End: 2025-09-04 | Stop reason: HOSPADM

## 2025-09-04 RX ORDER — PROCHLORPERAZINE EDISYLATE 5 MG/ML
10 INJECTION INTRAMUSCULAR; INTRAVENOUS EVERY 6 HOURS PRN
Status: DISCONTINUED | OUTPATIENT
Start: 2025-09-04 | End: 2025-09-04 | Stop reason: HOSPADM

## 2025-09-04 RX ORDER — LAMOTRIGINE 25 MG/1
500 TABLET ORAL ONCE
Status: COMPLETED | OUTPATIENT
Start: 2025-09-04 | End: 2025-09-04

## 2025-09-04 RX ORDER — DIPHENHYDRAMINE HYDROCHLORIDE 50 MG/ML
50 INJECTION, SOLUTION INTRAMUSCULAR; INTRAVENOUS AS NEEDED
Status: DISCONTINUED | OUTPATIENT
Start: 2025-09-04 | End: 2025-09-04 | Stop reason: HOSPADM

## 2025-09-04 RX ORDER — FAMOTIDINE 10 MG/ML
20 INJECTION, SOLUTION INTRAVENOUS ONCE AS NEEDED
Status: DISCONTINUED | OUTPATIENT
Start: 2025-09-04 | End: 2025-09-04 | Stop reason: HOSPADM

## 2025-09-04 RX ADMIN — FULVESTRANT 500 MG: 50 INJECTION INTRAMUSCULAR at 09:14

## 2025-09-04 ASSESSMENT — PAIN SCALES - GENERAL: PAINLEVEL_OUTOF10: 0-NO PAIN

## 2025-09-08 ENCOUNTER — APPOINTMENT (OUTPATIENT)
Dept: HEMATOLOGY/ONCOLOGY | Facility: CLINIC | Age: 75
End: 2025-09-08
Payer: COMMERCIAL

## 2025-09-10 ENCOUNTER — APPOINTMENT (OUTPATIENT)
Dept: HEMATOLOGY/ONCOLOGY | Facility: CLINIC | Age: 75
End: 2025-09-10
Payer: COMMERCIAL

## 2025-09-24 ENCOUNTER — APPOINTMENT (OUTPATIENT)
Dept: OPHTHALMOLOGY | Facility: CLINIC | Age: 75
End: 2025-09-24
Payer: COMMERCIAL

## 2025-09-30 ENCOUNTER — APPOINTMENT (OUTPATIENT)
Dept: HEMATOLOGY/ONCOLOGY | Facility: CLINIC | Age: 75
End: 2025-09-30
Payer: COMMERCIAL

## 2025-10-21 ENCOUNTER — APPOINTMENT (OUTPATIENT)
Dept: PRIMARY CARE | Facility: CLINIC | Age: 75
End: 2025-10-21
Payer: COMMERCIAL

## 2025-10-28 ENCOUNTER — APPOINTMENT (OUTPATIENT)
Dept: HEMATOLOGY/ONCOLOGY | Facility: CLINIC | Age: 75
End: 2025-10-28
Payer: COMMERCIAL

## 2025-11-25 ENCOUNTER — APPOINTMENT (OUTPATIENT)
Dept: HEMATOLOGY/ONCOLOGY | Facility: CLINIC | Age: 75
End: 2025-11-25
Payer: COMMERCIAL